# Patient Record
Sex: MALE | Race: WHITE | NOT HISPANIC OR LATINO | Employment: FULL TIME | URBAN - METROPOLITAN AREA
[De-identification: names, ages, dates, MRNs, and addresses within clinical notes are randomized per-mention and may not be internally consistent; named-entity substitution may affect disease eponyms.]

---

## 2017-01-10 ENCOUNTER — HOSPITAL ENCOUNTER (EMERGENCY)
Facility: CLINIC | Age: 37
Discharge: HOME | End: 2017-01-10
Attending: EMERGENCY MEDICINE | Admitting: EMERGENCY MEDICINE
Payer: COMMERCIAL

## 2017-01-10 VITALS
SYSTOLIC BLOOD PRESSURE: 150 MMHG | HEIGHT: 70 IN | TEMPERATURE: 98.3 F | DIASTOLIC BLOOD PRESSURE: 88 MMHG | OXYGEN SATURATION: 99 %

## 2017-01-10 DIAGNOSIS — L03.113 CELLULITIS OF RIGHT UPPER EXTREMITY: ICD-10-CM

## 2017-01-10 PROCEDURE — 99283 EMERGENCY DEPT VISIT LOW MDM: CPT

## 2017-01-10 RX ORDER — CLINDAMYCIN HCL 300 MG
300 CAPSULE ORAL 4 TIMES DAILY
Qty: 40 CAPSULE | Refills: 0 | Status: SHIPPED | OUTPATIENT
Start: 2017-01-10 | End: 2017-01-20

## 2017-01-10 ASSESSMENT — ENCOUNTER SYMPTOMS
FEVER: 0
SORE THROAT: 1
NAUSEA: 0
VOMITING: 0
SHORTNESS OF BREATH: 0
DIARRHEA: 0
COLOR CHANGE: 1

## 2017-01-10 NOTE — ED AVS SNAPSHOT
Emergency Department    [address] [place] [address]    Phone:  [phone]    Fax:  [phone]                                       Lj Lamas   MRN: [id number]    Department:   Emergency Department   Date of Visit:  1/10/2017           After Visit Summary Signature Page     I have received my discharge instructions, and my questions have been answered. I have discussed any challenges I see with this plan with the nurse or doctor.    ..........................................................................................................................................  Patient/Patient Representative Signature      ..........................................................................................................................................  Patient Representative Print Name and Relationship to Patient    ..................................................               ................................................  Date                                            Time    ..........................................................................................................................................  Reviewed by Signature/Title    ...................................................              ..............................................  Date                                                            Time

## 2017-01-10 NOTE — ED AVS SNAPSHOT
Emergency Department    [address] [facility] [address]    Phone:  [phone]    Fax:  [phone]                                       Lj Lamas   MRN: [id number]    Department:   Emergency Department   Date of Visit:  1/10/2017           Patient Information     Date Of Birth          1980        Your diagnoses for this visit were:     Cellulitis of right upper extremity        You were seen by Eligio Quiroga MD.      Follow-up Information     Schedule an appointment as soon as possible for a visit with [facility].    Specialties:  Podiatry, Internal Medicine, Family Medicine    Why:  If symptoms worsen    Contact information:    [address] [facility] [address]  [facility] [address] [phone]        Discharge Instructions         Discharge Instructions for Cellulitis  You have been diagnosed with cellulitis. This is an infection in the deepest layer of the skin. In some cases, the infection also affects the muscle. Cellulitis is caused by bacteria. The bacteria can enter the body through broken skin. This can happen with a cut, scratch, animal bite, or an insect bite that has been scratched. You may have been treated in the hospital with antibiotics and fluids. You will likely be given a prescription for antibiotics to take at home. This sheet will help you take care of yourself at home.  Home Care  When you are home:    Take the prescribed antibiotic medication you are given as directed until it is gone. Take it even if you feel better. It treats the infection and stops it from returning. Not taking all of the medication can make future infections hard to treat.    Keep the infected area clean.    When possible, raise the infected area above the level of your heart. This helps keep swelling down.    Talk to your doctor if you are in pain. Ask what kind of over-the-counter medication you can take for pain.    Apply clean bandages as advised.    Take  your temperature once a day for a week.    Wash your hands often to prevent spreading the infection.  In the future, wash your hands before and after you touch cuts, scratches, or bandages. This will help prevent infection.   When to Call Your Doctor  Call your doctor immediately if you have any of the following:    Vomiting    Fever of100.4 F (38 C) or higher, or as directed by your health care provider    Shaking chills    Redness that gets worse in or around the infected area    Swelling of the infected area    Pain that gets worse in or around the infected area    Difficulty or pain when moving the joints above or below the infected area    Discharge or pus draining from the area     7209-1504 [employer]. [address] [place] [address], [place] [address]. All rights reserved. This information is not intended as a substitute for professional medical care. Always follow your healthcare professional's instructions.          24 Hour Appointment Hotline       To make an appointment at any [facility], call [phone] ([phone]). If you don't have a family doctor or clinic, we will help you find one. [facility] clinics are conveniently located to serve the needs of you and your family.             Review of your medicines      START taking        Dose / Directions Last dose taken    clindamycin 300 MG capsule   Commonly known as:  CLEOCIN   Dose:  300 mg   Quantity:  40 capsule        Take 1 capsule (300 mg) by mouth 4 times daily for 10 days   Refills:  0                Prescriptions were sent or printed at these locations (1 Prescription)                   Other Prescriptions                Printed at Department/Unit printer (1 of 1)         clindamycin (CLEOCIN) 300 MG capsule                Orders Needing Specimen Collection     None      Pending Results     No orders found from 1/9/2017 to 1/11/2017.            Pending Culture Results     No orders found from 1/9/2017 to 1/11/2017.              Test Results from your hospital stay            Clinical Quality Measure: Blood Pressure Screening     Your blood pressure was checked while you were in the emergency department today. The last reading we obtained was  BP: 150/88 mmHg . Please read the guidelines below about what these numbers mean and what you should do about them.  If your systolic blood pressure (the top number) is less than 120 and your diastolic blood pressure (the bottom number) is less than 80, then your blood pressure is normal. There is nothing more that you need to do about it.  If your systolic blood pressure (the top number) is 120-139 or your diastolic blood pressure (the bottom number) is 80-89, your blood pressure may be higher than it should be. You should have your blood pressure rechecked within a year by a primary care provider.  If your systolic blood pressure (the top number) is 140 or greater or your diastolic blood pressure (the bottom number) is 90 or greater, you may have high blood pressure. High blood pressure is treatable, but if left untreated over time it can put you at risk for heart attack, stroke, or kidney failure. You should have your blood pressure rechecked by a primary care provider within the next 4 weeks.  If your provider in the emergency department today gave you specific instructions to follow-up with your doctor or provider even sooner than that, you should follow that instruction and not wait for up to 4 weeks for your follow-up visit.        Thank you for choosing [facility]       Thank you for choosing [facility] for your care. Our goal is always to provide you with excellent care. Hearing back from our patients is one way we can continue to improve our services. Please take a few minutes to complete the written survey that you may receive in the mail after you visit with us. Thank you!        [employer] Information     "[employer] lets you send messages to your doctor, view your test results, renew your  "prescriptions, schedule appointments and more. To sign up, go to [email or web address] . Click on \"Log in\" on the left side of the screen, which will take you to the Welcome page. Then click on \"Sign up Now\" on the right side of the page.     You will be asked to enter the access code listed below, as well as some personal information. Please follow the directions to create your username and password.     Your access code is: [email or web address]  Expires: 4/10/2017  6:44 PM     Your access code will  in 90 days. If you need help or a new code, please call your [facility] clinic or [phone].        Care EveryWhere ID     This is your Care EveryWhere ID. This could be used by other organizations to access your [facility] medical records  [id number]        After Visit Summary       This is your record. Keep this with you and show to your community pharmacist(s) and doctor(s) at your next visit.                  "

## 2017-01-11 NOTE — DISCHARGE INSTRUCTIONS
Discharge Instructions for Cellulitis  You have been diagnosed with cellulitis. This is an infection in the deepest layer of the skin. In some cases, the infection also affects the muscle. Cellulitis is caused by bacteria. The bacteria can enter the body through broken skin. This can happen with a cut, scratch, animal bite, or an insect bite that has been scratched. You may have been treated in the hospital with antibiotics and fluids. You will likely be given a prescription for antibiotics to take at home. This sheet will help you take care of yourself at home.  Home Care  When you are home:    Take the prescribed antibiotic medication you are given as directed until it is gone. Take it even if you feel better. It treats the infection and stops it from returning. Not taking all of the medication can make future infections hard to treat.    Keep the infected area clean.    When possible, raise the infected area above the level of your heart. This helps keep swelling down.    Talk to your doctor if you are in pain. Ask what kind of over-the-counter medication you can take for pain.    Apply clean bandages as advised.    Take your temperature once a day for a week.    Wash your hands often to prevent spreading the infection.  In the future, wash your hands before and after you touch cuts, scratches, or bandages. This will help prevent infection.   When to Call Your Doctor  Call your doctor immediately if you have any of the following:    Vomiting    Fever of100.4 F (38 C) or higher, or as directed by your health care provider    Shaking chills    Redness that gets worse in or around the infected area    Swelling of the infected area    Pain that gets worse in or around the infected area    Difficulty or pain when moving the joints above or below the infected area    Discharge or pus draining from the area     2087-1883 [employer]. [address] [place] [address], [place] [address]. All rights reserved. This  information is not intended as a substitute for professional medical care. Always follow your healthcare professional's instructions.

## 2017-01-11 NOTE — ED PROVIDER NOTES
"  History     Chief Complaint:    Rash      HPI   Lj Lamas is a 36 year old male with a history of MRSA infection 4 months ago on his leg,  who presents complaining of finding a red spot over \"a bump\" on his arm after showering. The tumor is on the anterior aspect of the superior right arm, he refers that it's increased in size since this morning.  No numbness or tingling, he denies fever, nausea and vomiting, diarrhea or rashes. He also denies chest pain or SOB.he reports previous  Congestion and sore throat with negative strep throat.    Allergies:  The patient has no known drug allergies.    Medications:    The patient is not currently taking any prescribed medications.    Past Medical History:    Lower extremity MRSA infection 2016    Past Surgical History:    No past surgical history on file.    Family History:    No family history on file.    Social History:  Marital Status:   [4]     Review of Systems   Constitutional: Negative for fever.   HENT: Positive for congestion and sore throat.    Respiratory: Negative for shortness of breath.    Cardiovascular: Negative for chest pain.   Gastrointestinal: Negative for nausea, vomiting and diarrhea.   Skin: Positive for color change.   All other systems reviewed and are negative.        Physical Exam   First Vitals:       Physical Exam  Nursing note and vitals reviewed.  Constitutional:   Awake alert  HENT:   Pharynx normal, TMs normal, atraumatic  Mouth/Throat:  Oropharynx is clear and moist.   Cardiovascular: Normal rate, regular rhythm, normal heart sounds and intact distal pulses.    Pulmonary/Chest:  Effort normal and breath sounds normal. No respiratory distress. No wheezes. No rales. No tenderness.   Neurological:  CMS normal.  Skin:    Skin is warm and dry. 2x3 cm area of erythema and warmth over the bernabe-superior aspect of right arm, without induration or abscess.      Emergency Department Course     Emergency Department Course:  Nursing notes " and vitals reviewed.  I performed an exam of the patient as documented above.     I discussed the treatment plan with the patient. They expressed understanding of this plan and consented to discharge. They will be discharged home with instructions for care and follow up. In addition, the patient will return to the emergency department if their symptoms persist, worsen, if new symptoms arise or if there is any concern.  All questions were answered.    I personally reviewed the findings with the patient and answered all related questions prior to discharge.    Impression & Plan      Medical Decision Makin-year-old male noted a red area on his right upper arm during his shower today/This morning.  He was reading online and is concerned for possible blood clot.  No chest pain or shortness of breath.  He does have a recent history of MRSA infection to his leg.  No fever.  Area of redness is getting a little bit larger since this morning.  He has some mild discomfort with it.  No numbness tingling or weakness.  No fever.  Exam otherwise unremarkable.  No indication for DVT.  I think this is more skin cellulitis.  No sign of trauma or injury.  Will cover with clindamycin for 10 days and referral to local primary care clinic for recheck if not improving.. PERC negative    Diagnosis:    ICD-10-CM    1. Cellulitis of right upper extremity L03.113        Disposition:   home    Discharge Medications:  New Prescriptions    CLINDAMYCIN (CLEOCIN) 300 MG CAPSULE    Take 1 capsule (300 mg) by mouth 4 times daily for 10 days     Scribe Disclosure:  Tianna SANCHEZ, am serving as a scribe at 6:44 PM on 1/10/2017 to document services personally performed by Eligio Quiroga MD, based on my observations and the provider's statements to me.        Eligio Quiroga MD  01/10/17 9716

## 2017-02-09 ENCOUNTER — OFFICE VISIT (OUTPATIENT)
Dept: FAMILY MEDICINE | Facility: CLINIC | Age: 37
End: 2017-02-09
Payer: COMMERCIAL

## 2017-02-09 VITALS
HEIGHT: 70 IN | TEMPERATURE: 97.6 F | RESPIRATION RATE: 20 BRPM | DIASTOLIC BLOOD PRESSURE: 86 MMHG | BODY MASS INDEX: 45.1 KG/M2 | HEART RATE: 71 BPM | SYSTOLIC BLOOD PRESSURE: 138 MMHG | OXYGEN SATURATION: 98 % | WEIGHT: 315 LBS

## 2017-02-09 DIAGNOSIS — M25.571 RIGHT ANKLE PAIN, UNSPECIFIED CHRONICITY: ICD-10-CM

## 2017-02-09 DIAGNOSIS — E66.01 MORBID OBESITY DUE TO EXCESS CALORIES (H): ICD-10-CM

## 2017-02-09 DIAGNOSIS — Z00.00 ROUTINE GENERAL MEDICAL EXAMINATION AT A HEALTH CARE FACILITY: Primary | ICD-10-CM

## 2017-02-09 DIAGNOSIS — I87.2 VENOUS (PERIPHERAL) INSUFFICIENCY: ICD-10-CM

## 2017-02-09 DIAGNOSIS — Z23 NEED FOR PROPHYLACTIC VACCINATION AND INOCULATION AGAINST INFLUENZA: ICD-10-CM

## 2017-02-09 PROCEDURE — 99385 PREV VISIT NEW AGE 18-39: CPT | Performed by: INTERNAL MEDICINE

## 2017-02-09 RX ORDER — CHLORAL HYDRATE 500 MG
2 CAPSULE ORAL DAILY
COMMUNITY
End: 2017-05-11

## 2017-02-09 RX ORDER — SODIUM PHOSPHATE,MONO-DIBASIC 19G-7G/118
1 ENEMA (ML) RECTAL DAILY
COMMUNITY
End: 2018-03-19

## 2017-02-09 RX ORDER — MULTIPLE VITAMINS W/ MINERALS TAB 9MG-400MCG
1 TAB ORAL DAILY
COMMUNITY
End: 2018-03-19

## 2017-02-09 NOTE — NURSING NOTE
"Chief Complaint   Patient presents with     Physical     Not Fasting       Initial /86 mmHg  Pulse 71  Temp(Src) 97.6  F (36.4  C) (Oral)  Resp 20  Ht 5' 10\" (1.778 m)  Wt 361 lb (163.749 kg)  BMI 51.80 kg/m2  SpO2 98% Estimated body mass index is 51.8 kg/(m^2) as calculated from the following:    Height as of this encounter: 5' 10\" (1.778 m).    Weight as of this encounter: 361 lb (163.749 kg).  Medication Reconciliation: complete   Viv Lamas CMA (AAMA)      "

## 2017-02-09 NOTE — PROGRESS NOTES
SUBJECTIVE:     CC: Lj Lamas is an 36 year old male who presents for preventative health visit.     Healthy Habits:    Do you get at least three servings of calcium containing foods daily (dairy, green leafy vegetables, etc.)? no, taking calcium and/or vitamin D supplement: yes -     Amount of exercise or daily activities, outside of work: 2-3 day(s) per week    Problems taking medications regularly No    Medication side effects: No    Have you had an eye exam in the past two years? yes    Do you see a dentist twice per year? No    Do you have sleep apnea, excessive snoring or daytime drowsiness?yes    1 year history of ankle pain after strain injury refractory to course of physical therapy left   He was treated for cellulitis in his right arm 1/10/17      Today's PHQ-2 Score:   PHQ-2 ( 1999 Pf[employer]) 2/9/2017   Q1: Little interest or pleasure in doing things 0   Q2: Feeling down, depressed or hopeless 1   PHQ-2 Score 1     Abuse: Current or Past(Physical, Sexual or Emotional)- No  Do you feel safe in your environment - Yes    Social History   Substance Use Topics     Smoking status: Former Smoker     Smokeless tobacco: Never Used     Alcohol Use: 0.0 oz/week     0 Standard drinks or equivalent per week     The patient does not drink >3 drinks per day nor >7 drinks per week.    Last PSA: No results found for: PSA    No results for input(s): CHOL, HDL, LDL, TRIG, CHOLHDLRATIO, NHDL in the last 46810 hours.    Reviewed orders with patient. Reviewed health maintenance and updated orders accordingly - Yes    All Histories reviewed and updated in Epic.  Past Medical History   Diagnosis Date     Venous (peripheral) insufficiency 2/9/2017      History reviewed. No pertinent past surgical history.    ROS:  C: NEGATIVE for fever, chills, he has gained weigh in recent months  I: NEGATIVE for worrisome rashes, moles or lesions; treated for cellulitis in leg and arm   E: NEGATIVE for vision changes or irritation  ENT:  "NEGATIVE for ear, mouth and throat problems  R: NEGATIVE for significant cough or SOB  CV: NEGATIVE for chest pain, palpitations; he gets intermittent edema in his bilateral legs   GI: NEGATIVE for nausea, abdominal pain, heartburn, or change in bowel habits   male: negative for dysuria, hematuria, decreased urinary stream, erectile dysfunction, urethral discharge  M: See HPI, also has plantar fasciitis,   N: He describes a long history of paresthesias in his bilateral feet form \"compressed vertebra\" in his back from a remote ski injury   P: He has stress related to break up currently, but these symptoms are mild and he denies suicidal ideation     Patient Active Problem List   Diagnosis     Morbid obesity due to excess calories (H)     Venous (peripheral) insufficiency     History reviewed. No pertinent past surgical history.    Social History   Substance Use Topics     Smoking status: Former Smoker     Smokeless tobacco: Never Used     Alcohol Use: 0.0 oz/week     0 Standard drinks or equivalent per week     Family History   Problem Relation Age of Onset     DIABETES Mother      HEART DISEASE Father      DIABETES Maternal Grandfather      Lung Cancer Maternal Grandfather      Bone Cancer Maternal Grandfather      CANCER Paternal Grandfather          Current Outpatient Prescriptions   Medication Sig Dispense Refill     fish oil-omega-3 fatty acids 1000 MG capsule Take 2 g by mouth daily       glucosamine-chondroitin 500-400 MG CAPS per capsule Take 1 capsule by mouth daily       Probiotic Product (PROBIOTIC + OMEGA-3 PO)        multivitamin, therapeutic with minerals (MULTI-VITAMIN) TABS tablet Take 1 tablet by mouth daily       No Known Allergies  OBJECTIVE:     /86 mmHg  Pulse 71  Temp(Src) 97.6  F (36.4  C) (Oral)  Resp 20  Ht 5' 10\" (1.778 m)  Wt 361 lb (163.749 kg)  BMI 51.80 kg/m2  SpO2 98%  EXAM:  GENERAL: healthy, alert and no distress  EYES: Eyes grossly normal to inspection, PERRL and " conjunctivae and sclerae normal  HENT: ear canals and TM's normal, nose and mouth without ulcers or lesions  NECK: no adenopathy, no asymmetry, masses, or scars and thyroid normal to palpation  RESP: lungs clear to auscultation - no rales, rhonchi or wheezes  CV: regular rate and rhythm, normal S1 S2, no S3 or S4, no murmur, click or rub, and peripheral pulses strong  ABDOMEN: Obese,soft, nontender, no hepatosplenomegaly, no masses and bowel sounds normal   (male): normal male genitalia without lesions or urethral discharge, no hernia  MS: left ankle with full passive ROM without pain and without bony tenderness; 2+ bilateral lower extremity edema; varicose vein on posterior of left leg that is mildly tender  SKIN: Skin changes in both legs consistent with venous stasis and stasis dermatitis   NEURO: Normal strength and tone, mentation intact and speech normal  PSYCH: mentation appears normal, affect normal/bright    ASSESSMENT/PLAN:     1. Routine general medical examination at a health care facility      2. Right ankle pain, unspecified chronicity  See podiatry regarding this  Weight loss would help and weight loss would make surgical outcome better if surgery is indcated   - PODIATRY/FOOT & ANKLE SURGERY REFERRAL    3. Venous (peripheral) insufficiency  Discussed weight loss, diet considerations, compression hosiery and leg elevation.  Can see vein solutions for varicose vein on left leg     4. Morbid obesity due to excess calories (H)  Counseled on diet and exercise interventions to promote weight loss See instructions     5. Need for prophylactic vaccination and inoculation against influenza  He has this already this year       COUNSELING:  Reviewed preventive health counseling, as reflected in patient instructions  Special attention given to:        Regular exercise       Healthy diet/nutrition    BP Screening:   Last 3 BP Readings:    BP Readings from Last 3 Encounters:   02/09/17 138/86   01/10/17 150/88  "      The following was recommended to the patient:  Re-screen BP within a year and recommended lifestyle modifications       reports that he has quit smoking. He has never used smokeless tobacco.    Estimated body mass index is 51.8 kg/(m^2) as calculated from the following:    Height as of this encounter: 5' 10\" (1.778 m).    Weight as of this encounter: 361 lb (163.749 kg).   Weight management plan: Discussed healthy diet and exercise guidelines and patient will follow up in 12 months in clinic to re-evaluate.    Counseling Resources:  ATP IV Guidelines  Pooled Cohorts Equation Calculator  FRAX Risk Assessment  ICSI Preventive Guidelines  Dietary Guidelines for Americans, 2010  USDA's MyPlate  ASA Prophylaxis  Lung CA Screening    José Kan MD  [facility]  "

## 2017-02-09 NOTE — MR AVS SNAPSHOT
After Visit Summary   2/9/2017    Lj Lamas    MRN: [id number]           Patient Information     Date Of Birth          1980        Visit Information        Provider Department      2/9/2017 3:00 PM José Kna MD [facility]        Today's Diagnoses     Need for prophylactic vaccination and inoculation against influenza    -  1     Routine general medical examination at a health care facility         Right ankle pain, unspecified chronicity         Venous (peripheral) insufficiency           Care Instructions      Preventive Health Recommendations  Male Ages 26 - 39    Yearly exam:             See your health care provider every year in order to  o   Review health changes.   o   Discuss preventive care.    o   Review your medicines if your doctor has prescribed any.    You should be tested each year for STDs (sexually transmitted diseases), if you re at risk.     After age 35, talk to your provider about cholesterol testing. If you are at risk for heart disease, have your cholesterol tested at least every 5 years.     If you are at risk for diabetes, you should have a diabetes test (fasting glucose).  Shots: Get a flu shot each year. Get a tetanus shot every 10 years.     Nutrition:    Eat at least 5 servings of fruits and vegetables daily.     Eat whole-grain bread, whole-wheat pasta and brown rice instead of white grains and rice.     Talk to your provider about Calcium and Vitamin D.     Lifestyle    Exercise for at least 150 minutes a week (30 minutes a day, 5 days a week). This will help you control your weight and prevent disease.     Limit alcohol to one drink per day.     No smoking.     Wear sunscreen to prevent skin cancer.     See your dentist every six months for an exam and cleaning.     ---------------------------------------------------------------------------------    I recommend signing up for Svetlana Butler, Weight [employer] or [employer] to help with weight  loss      Call  [facility] to discuss management of your left leg varicose vein    Use compression stockings if you can to help with swelling    Return in 3 months Fasting         Follow-ups after your visit        Additional Services     PODIATRY/FOOT & ANKLE SURGERY REFERRAL       Your provider has referred you to: DIAN: [facility] and [facility] Raquel [phone]   [email or web address]    Please be aware that coverage of these services is subject to the terms and limitations of your health insurance plan.  Call member services at your health plan with any benefit or coverage questions.      Please bring the following to your appointment:  >>   Any x-rays, CTs or MRIs which have been performed.  Contact the facility where they were done to arrange for  prior to your scheduled appointment.    >>   List of current medications   >>   This referral request   >>   Any documents/labs given to you for this referral                  Follow-up notes from your care team     Return in about 3 months (around 5/9/2017) for Routine Visit fasting .      Who to contact     If you have questions or need follow up information about today's clinic visit or your schedule please contact [facility] directly at [phone].  Normal or non-critical lab and imaging results will be communicated to you by MyChart, letter or phone within 4 business days after the clinic has received the results. If you do not hear from us within 7 days, please contact the clinic through MyChart or phone. If you have a critical or abnormal lab result, we will notify you by phone as soon as possible.  Submit refill requests through Mobstats or call your pharmacy and they will forward the refill request to us. Please allow 3 business days for your refill to be completed.          Additional Information About Your Visit        Mobstats Information     [employer] lets you send messages to your doctor, view your  "test results, renew your prescriptions, schedule appointments and more. To sign up, go to [email or web address] . Click on \"Log in\" on the left side of the screen, which will take you to the Welcome page. Then click on \"Sign up Now\" on the right side of the page.     You will be asked to enter the access code listed below, as well as some personal information. Please follow the directions to create your username and password.     Your access code is: [email or web address]  Expires: 4/10/2017  6:44 PM     Your access code will  in 90 days. If you need help or a new code, please call your [place] clinic or [phone].        Care EveryWhere ID     This is your Care EveryWhere ID. This could be used by other organizations to access your [place] medical records  [id number]        Your Vitals Were     Pulse Temperature Respirations Height BMI (Body Mass Index) Pulse Oximetry    71 97.6  F (36.4  C) (Oral) 20 5' 10\" (1.778 m) 51.80 kg/m2 98%       Blood Pressure from Last 3 Encounters:   17 138/86   01/10/17 150/88    Weight from Last 3 Encounters:   17 361 lb (163.749 kg)              We Performed the Following     PODIATRY/FOOT & ANKLE SURGERY REFERRAL        Primary Care Provider Office Phone # Fax #    José Kan [phone]       [facility] [address]  [facility] [address]        Thank you!     Thank you for choosing [facility]  for your care. Our goal is always to provide you with excellent care. Hearing back from our patients is one way we can continue to improve our services. Please take a few minutes to complete the written survey that you may receive in the mail after your visit with us. Thank you!             Your Updated Medication List - Protect others around you: Learn how to safely use, store and throw away your medicines at www.disposemymeds.org.          This list is accurate as of: 17  3:39 PM.  Always use your most recent med list.    "                Brand Name Dispense Instructions for use    fish oil-omega-3 fatty acids 1000 MG capsule      Take 2 g by mouth daily       glucosamine-chondroitin 500-400 MG Caps per capsule      Take 1 capsule by mouth daily       Multi-vitamin Tabs tablet      Take 1 tablet by mouth daily       PROBIOTIC + OMEGA-3 PO

## 2017-02-09 NOTE — PATIENT INSTRUCTIONS
Preventive Health Recommendations  Male Ages 26 - 39    Yearly exam:             See your health care provider every year in order to  o   Review health changes.   o   Discuss preventive care.    o   Review your medicines if your doctor has prescribed any.    You should be tested each year for STDs (sexually transmitted diseases), if you re at risk.     After age 35, talk to your provider about cholesterol testing. If you are at risk for heart disease, have your cholesterol tested at least every 5 years.     If you are at risk for diabetes, you should have a diabetes test (fasting glucose).  Shots: Get a flu shot each year. Get a tetanus shot every 10 years.     Nutrition:    Eat at least 5 servings of fruits and vegetables daily.     Eat whole-grain bread, whole-wheat pasta and brown rice instead of white grains and rice.     Talk to your provider about Calcium and Vitamin D.     Lifestyle    Exercise for at least 150 minutes a week (30 minutes a day, 5 days a week). This will help you control your weight and prevent disease.     Limit alcohol to one drink per day.     No smoking.     Wear sunscreen to prevent skin cancer.     See your dentist every six months for an exam and cleaning.     ---------------------------------------------------------------------------------    I recommend signing up for Svetlana Butler, Weight Watchers or [employer] to help with weight loss      Call  [facility] to discuss management of your left leg varicose vein    Use compression stockings if you can to help with swelling    Return in 3 months Fasting

## 2017-05-05 ENCOUNTER — APPOINTMENT (OUTPATIENT)
Dept: VASCULAR SURGERY | Facility: CLINIC | Age: 37
End: 2017-05-05
Payer: COMMERCIAL

## 2017-05-05 PROCEDURE — 99207 ZZC VEINSOLUTIONS FREE SCREENING: CPT | Performed by: SURGERY

## 2017-05-09 ENCOUNTER — RADIANT APPOINTMENT (OUTPATIENT)
Dept: GENERAL RADIOLOGY | Facility: CLINIC | Age: 37
End: 2017-05-09
Attending: PODIATRIST
Payer: COMMERCIAL

## 2017-05-09 ENCOUNTER — OFFICE VISIT (OUTPATIENT)
Dept: PODIATRY | Facility: CLINIC | Age: 37
End: 2017-05-09
Payer: COMMERCIAL

## 2017-05-09 VITALS
SYSTOLIC BLOOD PRESSURE: 126 MMHG | BODY MASS INDEX: 45.1 KG/M2 | HEIGHT: 70 IN | DIASTOLIC BLOOD PRESSURE: 80 MMHG | HEART RATE: 73 BPM | WEIGHT: 315 LBS

## 2017-05-09 DIAGNOSIS — G89.29 CHRONIC PAIN OF LEFT ANKLE: ICD-10-CM

## 2017-05-09 DIAGNOSIS — M72.2 PLANTAR FASCIITIS: ICD-10-CM

## 2017-05-09 DIAGNOSIS — M25.572 CHRONIC PAIN OF LEFT ANKLE: ICD-10-CM

## 2017-05-09 DIAGNOSIS — G89.29 CHRONIC PAIN OF LEFT ANKLE: Primary | ICD-10-CM

## 2017-05-09 DIAGNOSIS — M25.572 CHRONIC PAIN OF LEFT ANKLE: Primary | ICD-10-CM

## 2017-05-09 PROCEDURE — 73610 X-RAY EXAM OF ANKLE: CPT | Mod: LT

## 2017-05-09 PROCEDURE — 99203 OFFICE O/P NEW LOW 30 MIN: CPT | Performed by: PODIATRIST

## 2017-05-09 NOTE — MR AVS SNAPSHOT
After Visit Summary   5/9/2017    Lj Lamas    MRN: [id number]           Patient Information     Date Of Birth          1980        Visit Information        Provider Department      5/9/2017 4:40 PM Reno Griffith DPM [facility]        Today's Diagnoses     Chronic pain of left ankle    -  1    Plantar fasciitis          Care Instructions    PLANTAR FASCIITIS     What is plantar fasciitis?     Plantar fasciitis is often referred to as heel spurs or heel pain. Plantar fasciitis is a very common problem that affects people of all foot shapes, age, weight and activity level. Pain may be in the arch or on the weight-bearing surface of the heel. The pain may come on without injury or identifiable cause. Pain is generally present when first getting out of bed in the morning or up from a seated break.   What causes plantar fasciitis?     The plantar fascia is a dense fibrous band of tissue that stretches across the bottom surface of the foot. The fascia helps support the foot muscles and arch. Plantar fasciitis is thought to be caused by mechanical strain or overload. Frequent walking without shoes or wearing unsupportive shoes is thought to cause structural overload and ultimately inflammation of the plantar fascia. Some people have heel spurs that can be seen on x-ray. The heel spur is actually evidence of plantar fascitis and is not the cause.   How long will this last?     Plantar fasciitis can last from one day to a lifetime. Some people get intermittent fasciitis that is very short-lived. Others suffer daily for years. Excessive body weight, frequent bare foot walking, long hours on the feet, inadequate shoes, predisposing foot structures and excessive activity such as running are all potential issues that lead to chronic and/or recurring plantar fasciitis. Having plantar fasciitis means that you are forever prone to this problem and will require modification of some of the  above factors. Most people seek treatment within one to four months. Healing usually requires a similar one to four month time frame. Healing time is relative to the amount of effort spent treating the problem.   What can I do?     The easiest solution is to stop walking around your home without shoes. Plantar fasciitis is largely a shoe problem. Shoes are either not being worn often enough or your current shoes are inadequate for your weight, foot structure or activity level. The majority of shoes on the market today are not sufficient to resist development of plantar fasciitis or to promote healing. Assume that your current shoes are inadequate and will need to be replaced. Even high quality shoes wear out with 6 months to one year of frequent use. Weight loss is another option. Losing ten pounds in the next two months may be enough to resolve the problem. Ice applied to the area of pain two to three times per day for ten minutes each session can be very helpful. This should continue until the problem resolves. Achilles tendon stretching is essential. Stretch multiple times daily to promote healing and to prevent recurrence in the future.     What if this does not help?     Medical treatments often include custom arch supports, cortisone injections, physical therapy, splints to be worn in bed, prescription medications and surgery. The home treatments listed above will be necessary regardless of these advanced medical treatments. Surgery is rarely needed but is very helpful in selected cases.     Heel pain in my future?   Plantar fasciitis is highly recurrent. Risk factors often continue, including return to barefoot walking, inadequate shoes, excessive body weight, excessive activities, etc. Your lifestyle and foot structure may predispose you to recurrent plantar fasciitis. A daily prevention regimen can be very helpful. Ongoing use of shoe inserts, careful attention to appropriate shoes, daily Achilles  stretching, etc. may prevent recurrence. Prompt attention at the earliest warning signs of heel pain can resolve the problem in as short as a few days.   Below are some exercises for Plantar fasciitis:  Stair exercise  Step on a stair with the ball of your foot and hold your position for at least 15 seconds, then slowly step down with the heels of your foot. You can do this daily and as often as you want.   Picking the towel  Sit comfortably and then pick at a towel with your toes. Do this at least 10 to 20 times regularly. You can use any object other than a towel as long as the material is soft.  Rolling the bottle or ball  You can get a small ball or bottle and then roll it with your foot. Do this daily for at least 15 to 20 times.   Stretching the calf  Lie on your back, raise one foot, and then point your toes towards the floor. Do this daily for at least 15 to 20 times.   Flex the toes  Sit comfortably and then flex your toes by pointing it towards the floor or towards your body. This will relax and flex your foot and exercise your plantar fascia, the calf, and the Achilles tendon. The inability of the foot to stretch often causes the bunching up of the plantar fascia area, leading to the pain.  Towel stretch  Sling a towel around the ball of the foot and stretch the foot back.  Hold for 15-20 seconds.  Do this 4-5 times/day.    Massaging the calf and the plantar fascia also helps a lot in alleviating the pain and preventing its recurrence.          Over the Counter Inserts    Super Feet are the most common and easiest to find.    Locations include any [employer], [employer] in [place] on [address] [address] and in [facility] [address], Generate in \[facility] [place] [facility]\"" on [facility], [facility] Running Room in \[place]\"" on [facility], [facility] Running Room in [facility] [address], [employer] in [place] on [address] and [employer] in \[place]\"" on  [place] and in [place] on [facility].    Spenco can be found online and at [employer] in [facility] on [address], [employer] in [facility] on [address], [employer] Store in [place] on [address], [employer] in [place] on [address] and [employer] in [place] on [address].    Power Step can be a little harder to find.  Locations include [employer] in [place] on [address], [place] in [facility], [employer] Store in [place] on [address] and online    Walk-Fit - [employer]     [facility]    **  A good high quality over the counter insert can cost around $40-$50.      PRICE Therapy    Many aches and pains throughout the foot and ankle can be helped with many simple treatments.  This is usually described as PRICE Therapy.      P - Protection - often times, inflammation/pain in the lower extremity is not able to improve simply because the areas involved are never allowed to rest.  Every step we take can bother the problematic area.  Protecting those areas is an important step in the healing process.  This may involve a walking cast boot, a special insert/orthotic device, an ankle brace, or simply avoiding barefoot walking.    R - Rest - in addition to protecting the foot/ankle, resting is an important, but often times difficult, treatment option.  Getting off your feet when they bother you, and specifically avoiding activities that cause pain/discomfort, are very beneficial to prevent, and treat, foot/ankle pain.      I - Ice - icing regularly can help to decrease inflammation and swelling in the foot, thus decreasing pain.  Using an ice pack or a bag of frozen peas works very well.  Ice for 20 minutes multiple times per day as needed.  Do not place the ice directly on the skin as this can cause tissue damage.    C - Compression - using a compression wrap or an ACE wrap can help to decrease swelling, which can help to decrease pain.  Wearing the wraps is generally not needed  at night, but they should be worn on a regular basis when you are going to be on your feet for prolonged periods as gravity tends to pull fluids down to your feet/ankles.    E - Elevation - elevating your lower extremities multiple times daily for 15-20 minutes can help to decrease swelling, which works well in decreasing pain levels.      NSAID/Tylenol - An anti-inflammatory, like Aleve or ibuprofen, and/or a pain medication, such as Tylenol, can help to improve pain levels and get the issue resolved sooner rather than later.  Anyone with liver issues should be careful with Tylenol, and anyone with high blood pressure or heart, stomach or kidney issues should be careful with anti-inflammatories.  Please ask if you have questions about these medications, including dosage.        Body Mass Index (BMI)  Many things can cause foot and ankle problems. Foot structure, activity level, foot mechanics and injuries are common causes of pain.  One very important issue that often goes unmentioned is body weight. Extra weight can cause increased stress on muscles, ligaments, bones and tendons. Sometimes just a few extra pounds is all it takes to put one over her/his threshold. Without reducing that stress, it can be difficult to alleviate pain.   Some people are uncomfortable addressing this issue, but we feel it is important for you to think about it. As Foot & Ankle specialists, our job is addressing the lower extremity problem and possible causes.   Regarding extra body weight, we encourage patients to discuss diet and weight management plans with their primary care doctors. It is this team approach that gives you the best opportunity for pain relief and getting you back on your feet.           Follow-ups after your visit        Your next 10 appointments already scheduled     May 11, 2017  8:00 AM CDT   Office Visit with José Kan MD   [facility]    [address] [facility]  [id number] [phone]           Bring a current list of meds and any records pertaining to this visit.  For Physicals, please bring immunization records and any forms needing to be filled out.  Please arrive 10 minutes early to complete paperwork.            May 19, 2017  1:00 PM CDT   Ultrasound with  Vein Vascular Lab   [facility]    [address]  [facility] [id number] [phone]            May 19, 2017  2:00 PM CDT   Ultrasound Results with Varinder Dominguez MD   [facility])    [address]  [facility] [id number] [phone]              Future tests that were ordered for you today     Open Future Orders        Priority Expected Expires Ordered    MR Ankle Left w/o & w Contrast Routine  5/9/2018 5/9/2017            Who to contact     If you have questions or need follow up information about today's clinic visit or your schedule please contact [facility] directly at [phone].  Normal or non-critical lab and imaging results will be communicated to you by Dayakhart, letter or phone within 4 business days after the clinic has received the results. If you do not hear from us within 7 days, please contact the clinic through Dayakhart or phone. If you have a critical or abnormal lab result, we will notify you by phone as soon as possible.  Submit refill requests through Us[employer] or call your pharmacy and they will forward the refill request to us. Please allow 3 business days for your refill to be completed.          Additional Information About Your Visit        Dayakhart Information     [employer] gives you secure access to your electronic health record. If you see a primary care provider, you can also send messages to your care team and make appointments. If you have questions, please call your primary care clinic.  If you do not have a primary  "care provider, please call [phone] and they will assist you.        Care EveryWhere ID     This is your Care EveryWhere ID. This could be used by other organizations to access your [place] medical records  [id number]        Your Vitals Were     Pulse Height BMI (Body Mass Index)             73 5' 10\" (1.778 m) 49.88 kg/m2          Blood Pressure from Last 3 Encounters:   05/09/17 126/80   02/09/17 138/86   01/10/17 150/88    Weight from Last 3 Encounters:   05/09/17 (!) 347 lb 9.6 oz (157.7 kg)   02/09/17 (!) 361 lb (163.7 kg)               Primary Care Provider Office Phone # Fax #    José Kan [phone]       [facility] [address]  [facility] [address]        Thank you!     Thank you for choosing [facility]  for your care. Our goal is always to provide you with excellent care. Hearing back from our patients is one way we can continue to improve our services. Please take a few minutes to complete the written survey that you may receive in the mail after your visit with us. Thank you!             Your Updated Medication List - Protect others around you: Learn how to safely use, store and throw away your medicines at www.disposemymeds.org.          This list is accurate as of: 5/9/17  5:26 PM.  Always use your most recent med list.                   Brand Name Dispense Instructions for use    fish oil-omega-3 fatty acids 1000 MG capsule      Take 2 g by mouth daily       glucosamine-chondroitin 500-400 MG Caps per capsule      Take 1 capsule by mouth daily       Multi-vitamin Tabs tablet      Take 1 tablet by mouth daily       PROBIOTIC + OMEGA-3 PO            "

## 2017-05-09 NOTE — PROGRESS NOTES
PATIENT HISTORY:  Lj Lamas is a 36 year old male who presents to clinic for chronic left ankle pain.  Pt reports 10 sprains over his life, most recently winter 2015-16.  He went to PT after that injury.  Reports continuing pain in the ankle.  Denies feeling unstable.  Also with left plantar heel pain, worse after rest and with activity.  He has tried ice, heat, new shoes, otc inserts.  Not helping.      Review of Systems:  Patient denies fever, chills, rash, wound, stiffness, numbness, heart burn, blood in stool, chest pain with activity, calf pain when walking, shortness of breath with activity, chronic cough, easy bleeding/bruising, excessive thirst, fatigue, depression, anxiety.  Patient admits to limping, weakness, swelling of ankles.     PAST MEDICAL HISTORY:   Past Medical History:   Diagnosis Date     Venous (peripheral) insufficiency 2/9/2017        PAST SURGICAL HISTORY:   Past Surgical History:   Procedure Laterality Date     NO HISTORY OF SURGERY          MEDICATIONS:   Current Outpatient Prescriptions:      fish oil-omega-3 fatty acids 1000 MG capsule, Take 2 g by mouth daily, Disp: , Rfl:      glucosamine-chondroitin 500-400 MG CAPS per capsule, Take 1 capsule by mouth daily, Disp: , Rfl:      Probiotic Product (PROBIOTIC + OMEGA-3 PO), , Disp: , Rfl:      multivitamin, therapeutic with minerals (MULTI-VITAMIN) TABS tablet, Take 1 tablet by mouth daily, Disp: , Rfl:      ALLERGIES:  No Known Allergies     SOCIAL HISTORY:   Social History     Social History     Marital status:      Spouse name: N/A     Number of children: N/A     Years of education: N/A     Occupational History     Not on file.     Social History Main Topics     Smoking status: Former Smoker     Smokeless tobacco: Never Used     Alcohol use 0.0 oz/week     0 Standard drinks or equivalent per week     Drug use: No     Sexual activity: Not Currently     Other Topics Concern     Not on file     Social History Narrative       "  FAMILY HISTORY:   Family History   Problem Relation Age of Onset     DIABETES Mother      HEART DISEASE Father      DIABETES Maternal Grandfather      Lung Cancer Maternal Grandfather      Bone Cancer Maternal Grandfather      CANCER Paternal Grandfather         EXAM:Vitals: /80 (BP Location: Right arm, Patient Position: Chair, Cuff Size: Adult Large)  Pulse 73  Ht 5' 10\" (1.778 m)  Wt (!) 347 lb 9.6 oz (157.7 kg)  BMI 49.88 kg/m2  BMI= Body mass index is 49.88 kg/(m^2).    General appearance: Patient is alert and fully cooperative with history & exam.  No sign of distress is noted during the visit.     Psychiatric: Affect is pleasant & appropriate.  Patient appears motivated to improve health.     Respiratory: Breathing is regular & unlabored while sitting.     HEENT: Hearing is intact to spoken word.  Speech is clear.  No gross evidence of visual impairment that would impact ambulation.    Pt deferred RLE exam.     Dermatologic: Skin is intact to LLE.  Skin discoloration of lower leg consistent with venous stasis.  No paronychia or evidence of soft tissue infection is noted.     Vascular: DP & PT pulses are intact & regular on the left.  L ankle edema noted.  CFT and skin temperature are normal to both lower extremities.     Neurologic: Lower extremity sensation is intact to light touch.  No evidence of weakness or contracture in the lower extremities.  No evidence of neuropathy.     Musculoskeletal: No calf pain on L.  L ankle exam with tenderness to ATFL and CFL area, joint line.  No gross instability.  No pain with palpation of malleoli, syndesmosis, proximal fibula.  pain to plantar palpation of the left heel at the fascial insertion.  No pain with side to side heel squeeze.  Patient is ambulatory without assistive device or brace.  No gross ankle deformity noted.  No foot or ankle joint effusion is noted.    XRs of left ankle reviewed with pt.     ASSESSMENT:   Chronic L ankle pain  L plantar " fasciitis     PLAN:  Reviewed patient's chart in epic.  Discussed condition and treatment options including pros and cons.    Will order L ankle MRI.  F/u in clinic after scan.    The potential causes and nature of plantar fasciitis were discussed with the patient.  We reviewed the natural history/prognosis of the condition and risks if left untreated.      We discussed possible causes of the condition as it relates to the patients specific situation.      Conservative treatment options were reviewed:  appropriate shoes, avoidance of barefoot walking, inserts/orthoses, stretching, ice, massage, immobilization and NSAIDs.     We also reviewed the option of injection therapy.     After thorough discussion and answering all questions, the patient elected to try icing, stretching, superfeet inserts.          Reno Griffith DPM, FACFAS

## 2017-05-09 NOTE — NURSING NOTE
"Chief Complaint   Patient presents with     Ankle Pain     L ankle instability, swelling and ligament tenderness- No recent injury       Initial /80 (BP Location: Right arm, Patient Position: Chair, Cuff Size: Adult Large)  Pulse 73  Ht 5' 10\" (1.778 m)  Wt (!) 347 lb 9.6 oz (157.7 kg)  BMI 49.88 kg/m2 Estimated body mass index is 49.88 kg/(m^2) as calculated from the following:    Height as of this encounter: 5' 10\" (1.778 m).    Weight as of this encounter: 347 lb 9.6 oz (157.7 kg).  Medication Reconciliation: saundra Cordova MA May 9, 2017 4:42 PM      "

## 2017-05-09 NOTE — PATIENT INSTRUCTIONS
PLANTAR FASCIITIS     What is plantar fasciitis?     Plantar fasciitis is often referred to as heel spurs or heel pain. Plantar fasciitis is a very common problem that affects people of all foot shapes, age, weight and activity level. Pain may be in the arch or on the weight-bearing surface of the heel. The pain may come on without injury or identifiable cause. Pain is generally present when first getting out of bed in the morning or up from a seated break.   What causes plantar fasciitis?     The plantar fascia is a dense fibrous band of tissue that stretches across the bottom surface of the foot. The fascia helps support the foot muscles and arch. Plantar fasciitis is thought to be caused by mechanical strain or overload. Frequent walking without shoes or wearing unsupportive shoes is thought to cause structural overload and ultimately inflammation of the plantar fascia. Some people have heel spurs that can be seen on x-ray. The heel spur is actually evidence of plantar fascitis and is not the cause.   How long will this last?     Plantar fasciitis can last from one day to a lifetime. Some people get intermittent fasciitis that is very short-lived. Others suffer daily for years. Excessive body weight, frequent bare foot walking, long hours on the feet, inadequate shoes, predisposing foot structures and excessive activity such as running are all potential issues that lead to chronic and/or recurring plantar fasciitis. Having plantar fasciitis means that you are forever prone to this problem and will require modification of some of the above factors. Most people seek treatment within one to four months. Healing usually requires a similar one to four month time frame. Healing time is relative to the amount of effort spent treating the problem.   What can I do?     The easiest solution is to stop walking around your home without shoes. Plantar fasciitis is largely a shoe problem. Shoes are either not being worn often  enough or your current shoes are inadequate for your weight, foot structure or activity level. The majority of shoes on the market today are not sufficient to resist development of plantar fasciitis or to promote healing. Assume that your current shoes are inadequate and will need to be replaced. Even high quality shoes wear out with 6 months to one year of frequent use. Weight loss is another option. Losing ten pounds in the next two months may be enough to resolve the problem. Ice applied to the area of pain two to three times per day for ten minutes each session can be very helpful. This should continue until the problem resolves. Achilles tendon stretching is essential. Stretch multiple times daily to promote healing and to prevent recurrence in the future.     What if this does not help?     Medical treatments often include custom arch supports, cortisone injections, physical therapy, splints to be worn in bed, prescription medications and surgery. The home treatments listed above will be necessary regardless of these advanced medical treatments. Surgery is rarely needed but is very helpful in selected cases.     Heel pain in my future?   Plantar fasciitis is highly recurrent. Risk factors often continue, including return to barefoot walking, inadequate shoes, excessive body weight, excessive activities, etc. Your lifestyle and foot structure may predispose you to recurrent plantar fasciitis. A daily prevention regimen can be very helpful. Ongoing use of shoe inserts, careful attention to appropriate shoes, daily Achilles stretching, etc. may prevent recurrence. Prompt attention at the earliest warning signs of heel pain can resolve the problem in as short as a few days.   Below are some exercises for Plantar fasciitis:  Stair exercise  Step on a stair with the ball of your foot and hold your position for at least 15 seconds, then slowly step down with the heels of your foot. You can do this daily and as often  as you want.   Picking the towel  Sit comfortably and then pick at a towel with your toes. Do this at least 10 to 20 times regularly. You can use any object other than a towel as long as the material is soft.  Rolling the bottle or ball  You can get a small ball or bottle and then roll it with your foot. Do this daily for at least 15 to 20 times.   Stretching the calf  Lie on your back, raise one foot, and then point your toes towards the floor. Do this daily for at least 15 to 20 times.   Flex the toes  Sit comfortably and then flex your toes by pointing it towards the floor or towards your body. This will relax and flex your foot and exercise your plantar fascia, the calf, and the Achilles tendon. The inability of the foot to stretch often causes the bunching up of the plantar fascia area, leading to the pain.  Towel stretch  Sling a towel around the ball of the foot and stretch the foot back.  Hold for 15-20 seconds.  Do this 4-5 times/day.    Massaging the calf and the plantar fascia also helps a lot in alleviating the pain and preventing its recurrence.          Over the Counter Inserts    Super Feet are the most common and easiest to find.    Locations include any [employer], [employer] in [place] on [address] [address] and in [place] on [facility] [address], [employer] in [place] on [address], [facility] in [place] on [address], [facility] in [place] on [facility], [employer] in [place] on [address] [address] and [employer] in [place] on [address] and in [place] on [address].    Spenco can be found online and at [employer] [facility] in [place] on [address], [employer] in [facility], [employer] in [place] on [address], [employer] in [place] on [address] and [employer] in [place] on [address].    Power Step can be a little harder to find.  Locations include [employer] in [place] on [address],  [employer] in [facility], [employer] in [place] on [address] and online    [employer] - [employer]     [facility] - [facility]    **  A good high quality over the counter insert can cost around $40-$50.      PRICE Therapy    Many aches and pains throughout the foot and ankle can be helped with many simple treatments.  This is usually described as PRICE Therapy.      P - Protection - often times, inflammation/pain in the lower extremity is not able to improve simply because the areas involved are never allowed to rest.  Every step we take can bother the problematic area.  Protecting those areas is an important step in the healing process.  This may involve a walking cast boot, a special insert/orthotic device, an ankle brace, or simply avoiding barefoot walking.    R - Rest - in addition to protecting the foot/ankle, resting is an important, but often times difficult, treatment option.  Getting off your feet when they bother you, and specifically avoiding activities that cause pain/discomfort, are very beneficial to prevent, and treat, foot/ankle pain.      I - Ice - icing regularly can help to decrease inflammation and swelling in the foot, thus decreasing pain.  Using an ice pack or a bag of frozen peas works very well.  Ice for 20 minutes multiple times per day as needed.  Do not place the ice directly on the skin as this can cause tissue damage.    C - Compression - using a compression wrap or an ACE wrap can help to decrease swelling, which can help to decrease pain.  Wearing the wraps is generally not needed at night, but they should be worn on a regular basis when you are going to be on your feet for prolonged periods as gravity tends to pull fluids down to your feet/ankles.    E - Elevation - elevating your lower extremities multiple times daily for 15-20 minutes can help to decrease swelling, which works well in decreasing pain levels.      NSAID/Tylenol - An anti-inflammatory, like Aleve or  ibuprofen, and/or a pain medication, such as Tylenol, can help to improve pain levels and get the issue resolved sooner rather than later.  Anyone with liver issues should be careful with Tylenol, and anyone with high blood pressure or heart, stomach or kidney issues should be careful with anti-inflammatories.  Please ask if you have questions about these medications, including dosage.        Body Mass Index (BMI)  Many things can cause foot and ankle problems. Foot structure, activity level, foot mechanics and injuries are common causes of pain.  One very important issue that often goes unmentioned is body weight. Extra weight can cause increased stress on muscles, ligaments, bones and tendons. Sometimes just a few extra pounds is all it takes to put one over her/his threshold. Without reducing that stress, it can be difficult to alleviate pain.   Some people are uncomfortable addressing this issue, but we feel it is important for you to think about it. As Foot & Ankle specialists, our job is addressing the lower extremity problem and possible causes.   Regarding extra body weight, we encourage patients to discuss diet and weight management plans with their primary care doctors. It is this team approach that gives you the best opportunity for pain relief and getting you back on your feet.

## 2017-05-09 NOTE — PROGRESS NOTES
Weight management plan: Patient was referred to their PCP to discuss a diet and exercise plan.     DIMAS Cordova MA May 9, 2017 4:42 PM

## 2017-05-09 NOTE — LETTER
5/9/2017       RE: Lj Lamas  [address]  [address]  [place] [address]           Dear Colleague,    Thank you for referring your patient, Lj Lamas, to the [facility]. Please see a copy of my visit note below.    PATIENT HISTORY:  Lj Lamas is a 36 year old male who presents to clinic for chronic left ankle pain.  Pt reports 10 sprains over his life, most recently winter 2015-16.  He went to PT after that injury.  Reports continuing pain in the ankle.  Denies feeling unstable.  Also with left plantar heel pain, worse after rest and with activity.  He has tried ice, heat, new shoes, otc inserts.  Not helping.      Review of Systems:  Patient denies fever, chills, rash, wound, stiffness, numbness, heart burn, blood in stool, chest pain with activity, calf pain when walking, shortness of breath with activity, chronic cough, easy bleeding/bruising, excessive thirst, fatigue, depression, anxiety.  Patient admits to limping, weakness, swelling of ankles.     PAST MEDICAL HISTORY:   Past Medical History:   Diagnosis Date     Venous (peripheral) insufficiency 2/9/2017        PAST SURGICAL HISTORY:   Past Surgical History:   Procedure Laterality Date     NO HISTORY OF SURGERY          MEDICATIONS:   Current Outpatient Prescriptions:      fish oil-omega-3 fatty acids 1000 MG capsule, Take 2 g by mouth daily, Disp: , Rfl:      glucosamine-chondroitin 500-400 MG CAPS per capsule, Take 1 capsule by mouth daily, Disp: , Rfl:      Probiotic Product (PROBIOTIC + OMEGA-3 PO), , Disp: , Rfl:      multivitamin, therapeutic with minerals (MULTI-VITAMIN) TABS tablet, Take 1 tablet by mouth daily, Disp: , Rfl:      ALLERGIES:  No Known Allergies     SOCIAL HISTORY:   Social History     Social History     Marital status:      Spouse name: N/A     Number of children: N/A     Years of education: N/A     Occupational History     Not on file.     Social History Main Topics     Smoking status:  "Former Smoker     Smokeless tobacco: Never Used     Alcohol use 0.0 oz/week     0 Standard drinks or equivalent per week     Drug use: No     Sexual activity: Not Currently     Other Topics Concern     Not on file     Social History Narrative        FAMILY HISTORY:   Family History   Problem Relation Age of Onset     DIABETES Mother      HEART DISEASE Father      DIABETES Maternal Grandfather      Lung Cancer Maternal Grandfather      Bone Cancer Maternal Grandfather      CANCER Paternal Grandfather         EXAM:Vitals: /80 (BP Location: Right arm, Patient Position: Chair, Cuff Size: Adult Large)  Pulse 73  Ht 5' 10\" (1.778 m)  Wt (!) 347 lb 9.6 oz (157.7 kg)  BMI 49.88 kg/m2  BMI= Body mass index is 49.88 kg/(m^2).    General appearance: Patient is alert and fully cooperative with history & exam.  No sign of distress is noted during the visit.     Psychiatric: Affect is pleasant & appropriate.  Patient appears motivated to improve health.     Respiratory: Breathing is regular & unlabored while sitting.     HEENT: Hearing is intact to spoken word.  Speech is clear.  No gross evidence of visual impairment that would impact ambulation.    Pt deferred RLE exam.     Dermatologic: Skin is intact to LLE.  Skin discoloration of lower leg consistent with venous stasis.  No paronychia or evidence of soft tissue infection is noted.     Vascular: DP & PT pulses are intact & regular on the left.  L ankle edema noted.  CFT and skin temperature are normal to both lower extremities.     Neurologic: Lower extremity sensation is intact to light touch.  No evidence of weakness or contracture in the lower extremities.  No evidence of neuropathy.     Musculoskeletal: No calf pain on L.  L ankle exam with tenderness to ATFL and CFL area, joint line.  No gross instability.  No pain with palpation of malleoli, syndesmosis, proximal fibula.  pain to plantar palpation of the left heel at the fascial insertion.  No pain with side " to side heel squeeze.  Patient is ambulatory without assistive device or brace.  No gross ankle deformity noted.  No foot or ankle joint effusion is noted.    XRs of left ankle reviewed with pt.     ASSESSMENT:   Chronic L ankle pain  L plantar fasciitis     PLAN:  Reviewed patient's chart in epic.  Discussed condition and treatment options including pros and cons.    Will order L ankle MRI.  F/u in clinic after scan.    The potential causes and nature of plantar fasciitis were discussed with the patient.  We reviewed the natural history/prognosis of the condition and risks if left untreated.      We discussed possible causes of the condition as it relates to the patients specific situation.      Conservative treatment options were reviewed:  appropriate shoes, avoidance of barefoot walking, inserts/orthoses, stretching, ice, massage, immobilization and NSAIDs.     We also reviewed the option of injection therapy.     After thorough discussion and answering all questions, the patient elected to try icing, stretching, superfeet inserts.          Reno Griffith DPM, FACFAS      Weight management plan: Patient was referred to their PCP to discuss a diet and exercise plan.     DIMAS Cordova MA May 9, 2017 4:42 PM        Again, thank you for allowing me to participate in the care of your patient.        Sincerely,              Reno Griffith DPM

## 2017-05-11 ENCOUNTER — OFFICE VISIT (OUTPATIENT)
Dept: FAMILY MEDICINE | Facility: CLINIC | Age: 37
End: 2017-05-11
Payer: COMMERCIAL

## 2017-05-11 VITALS
WEIGHT: 315 LBS | HEIGHT: 70 IN | OXYGEN SATURATION: 98 % | DIASTOLIC BLOOD PRESSURE: 72 MMHG | SYSTOLIC BLOOD PRESSURE: 125 MMHG | TEMPERATURE: 97.4 F | HEART RATE: 61 BPM | BODY MASS INDEX: 45.1 KG/M2

## 2017-05-11 DIAGNOSIS — Z13.1 SCREENING FOR DIABETES MELLITUS: ICD-10-CM

## 2017-05-11 DIAGNOSIS — I87.2 VENOUS (PERIPHERAL) INSUFFICIENCY: ICD-10-CM

## 2017-05-11 DIAGNOSIS — Z13.220 LIPID SCREENING: ICD-10-CM

## 2017-05-11 DIAGNOSIS — E66.01 MORBID OBESITY DUE TO EXCESS CALORIES (H): ICD-10-CM

## 2017-05-11 DIAGNOSIS — G89.29 CHRONIC MIDLINE THORACIC BACK PAIN: Primary | ICD-10-CM

## 2017-05-11 DIAGNOSIS — M54.6 CHRONIC MIDLINE THORACIC BACK PAIN: Primary | ICD-10-CM

## 2017-05-11 LAB
CHOLEST SERPL-MCNC: 152 MG/DL
GLUCOSE BLD-MCNC: 88 MG/DL (ref 70–99)
HDLC SERPL-MCNC: 47 MG/DL
LDLC SERPL CALC-MCNC: 89 MG/DL
NONHDLC SERPL-MCNC: 105 MG/DL
TRIGL SERPL-MCNC: 79 MG/DL

## 2017-05-11 PROCEDURE — 82947 ASSAY GLUCOSE BLOOD QUANT: CPT | Performed by: INTERNAL MEDICINE

## 2017-05-11 PROCEDURE — 80061 LIPID PANEL: CPT | Performed by: INTERNAL MEDICINE

## 2017-05-11 PROCEDURE — 36415 COLL VENOUS BLD VENIPUNCTURE: CPT | Performed by: INTERNAL MEDICINE

## 2017-05-11 PROCEDURE — 99213 OFFICE O/P EST LOW 20 MIN: CPT | Performed by: INTERNAL MEDICINE

## 2017-05-11 NOTE — MR AVS SNAPSHOT
After Visit Summary   5/11/2017    Lj Lamas    MRN: [id number]           Patient Information     Date Of Birth          1980        Visit Information        Provider Department      5/11/2017 8:00 AM José Kan MD [facility]        Today's Diagnoses     Chronic midline thoracic back pain    -  1    Morbid obesity due to excess calories (H)        Venous (peripheral) insufficiency        Lipid screening        Screening for diabetes mellitus           Follow-ups after your visit        Your next 10 appointments already scheduled     May 16, 2017  6:15 PM CDT   (Arrive by 6:00 PM)   MR ANKLE LEFT W/O & W CONTRAST with [id number]   [facility] ([facility])    [address]  [facility] [address] [phone]           Take your medicines as usual, unless your doctor tells you not to. Bring a list of your current medicines to your exam (including vitamins, minerals and over-the-counter drugs).  You will be given intravenous contrast for this exam. To prepare:   The day before your exam, drink extra fluids at least six 8-ounce glasses (unless your doctor tells you to restrict your fluids).   Have a blood test (creatinine test) within 30 days of your exam. Go to your clinic or Diagnostic Imaging Department for this test.  The MRI machine uses a strong magnet. Please wear clothes without metal (snaps, zippers). A sweatsuit works well, or we may give you a hospital gown.  Please remove any body piercings and hair extensions before you arrive. You will also remove watches, jewelry, hairpins, wallets, dentures, partial dental plates and hearing aids. You may wear contact lenses, and you may be able to wear your rings. We have a safe place to keep your personal items, but it is safer to leave them at home.   **IMPORTANT** THE INSTRUCTIONS BELOW ARE ONLY FOR THOSE PATIENTS WHO HAVE BEEN TOLD THEY WILL RECEIVE SEDATION OR  GENERAL ANESTHESIA DURING THEIR MRI PROCEDURE:  IF YOU WILL RECEIVE SEDATION (take medicine to help you relax during your exam):   You must get the medicine from your doctor before you arrive. Bring the medicine to the exam. Do not take it at home.   Arrive one hour early. Bring someone who can take you home after the test. Your medicine will make you sleepy. After the exam, you may not drive, take a bus or take a taxi by yourself.   No eating 8 hours before your exam. You may have clear liquids up until 4 hours before your exam. (Clear liquids include water, clear tea, black coffee and fruit juice without pulp.)  IF YOU WILL RECEIVE ANESTHESIA (be asleep for your exam):   Arrive 1 1/2 hours early. Bring someone who can take you home after the test. You may not drive, take a bus or take a taxi by yourself.   No eating 8 hours before your exam. You may have clear liquids up until 4 hours before your exam. (Clear liquids include water, clear tea, black coffee and fruit juice without pulp.)  Please call the Imaging Department at your exam site with any questions.            May 19, 2017  1:00 PM CDT   Ultrasound with  [facility] V[facility] Lab   [facility]    [address]  [facility] [address] [phone]            May 19, 2017  2:00 PM CDT   Ultrasound Results with Varinder Dominguez MD   [facility]    [address]  [facility] [address] [phone]              Who to contact     If you have questions or need follow up information about today's clinic visit or your schedule please contact [facility] directly at [phone].  Normal or non-critical lab and imaging results will be communicated to you by MyChart, letter or phone within 4 business days after the clinic has received the results. If you do not hear from us within 7 days, please contact  "the clinic through [employer] or phone. If you have a critical or abnormal lab result, we will notify you by phone as soon as possible.  Submit refill requests through [employer] or call your pharmacy and they will forward the refill request to us. Please allow 3 business days for your refill to be completed.          Additional Information About Your Visit        [employer] Information     [employer] gives you secure access to your electronic health record. If you see a primary care provider, you can also send messages to your care team and make appointments. If you have questions, please call your primary care clinic.  If you do not have a primary care provider, please call [phone] and they will assist you.        Care EveryWhere ID     This is your Care EveryWhere ID. This could be used by other organizations to access your [facility] medical records  [id number]        Your Vitals Were     Pulse Temperature Height Pulse Oximetry BMI (Body Mass Index)       61 97.4  F (36.3  C) (Tympanic) 5' 10\" (1.778 m) 98% 49.93 kg/m2        Blood Pressure from Last 3 Encounters:   05/11/17 125/72   05/09/17 126/80   02/09/17 138/86    Weight from Last 3 Encounters:   05/11/17 (!) 348 lb (157.9 kg)   05/09/17 (!) 347 lb 9.6 oz (157.7 kg)   02/09/17 (!) 361 lb (163.7 kg)              We Performed the Following     Glucose, whole blood     Lipid Profile with reflex to direct LDL          Today's Medication Changes          These changes are accurate as of: 5/11/17  8:29 AM.  If you have any questions, ask your nurse or doctor.               Stop taking these medicines if you haven't already. Please contact your care team if you have questions.     fish oil-omega-3 fatty acids 1000 MG capsule   Stopped by:  José Kan MD                    Primary Care Provider Office Phone # Fax #    José Kan [phone]       [facility] [address] [facility] [address]        Thank you!     Thank you for " choosing [facility]  for your care. Our goal is always to provide you with excellent care. Hearing back from our patients is one way we can continue to improve our services. Please take a few minutes to complete the written survey that you may receive in the mail after your visit with us. Thank you!             Your Updated Medication List - Protect others around you: Learn how to safely use, store and throw away your medicines at www.disposemymeds.org.          This list is accurate as of: 5/11/17  8:29 AM.  Always use your most recent med list.                   Brand Name Dispense Instructions for use    glucosamine-chondroitin 500-400 MG Caps per capsule      Take 1 capsule by mouth daily       Multi-vitamin Tabs tablet      Take 1 tablet by mouth daily       PROBIOTIC + OMEGA-3 PO

## 2017-05-11 NOTE — NURSING NOTE
"Chief Complaint   Patient presents with     RECHECK     3 month.  FASTING for lab work today        Initial /72 (BP Location: Right arm, Patient Position: Chair, Cuff Size: Adult Large)  Pulse 61  Temp 97.4  F (36.3  C) (Tympanic)  Ht 5' 10\" (1.778 m)  Wt (!) 348 lb (157.9 kg)  SpO2 98%  BMI 49.93 kg/m2 Estimated body mass index is 49.93 kg/(m^2) as calculated from the following:    Height as of this encounter: 5' 10\" (1.778 m).    Weight as of this encounter: 348 lb (157.9 kg).  Medication Reconciliation: complete  "

## 2017-05-11 NOTE — LETTER
Dear Mr. Lamas,    The following letter pertains to your most recent diagnostic tests:    -Your cholesterol panel looks healthy.     -Your glucose (blood sugar) is normal      Bottom line:  Your labs look great.  Keep up the great work that you have been doing with weight loss!        If you have any further questions or problems, please contact our office.      Sincerely,    José Kan MD/RIN ZAVALA          Enclosure: Lab Results

## 2017-05-11 NOTE — PROGRESS NOTES
SUBJECTIVE:                                                    Lj Lamas is a 36 year old male who presents to clinic today for the following health issues:      Chief Complaint   Patient presents with     RECHECK     3 month.  FASTING for lab work today          Pleasant 36 year old man with venous insufficiency, varicose veins, chronic foot and ankle pain  who is a nonsmoker presents in follow-up after his physical about 3 months ago. Over the past 3 months, he has worked hard on diet and exercise interventions and managed to lose over 10 pounds. His blood pressure is improved today. He did meet with vascular surgery and podiatry to establish plans for his varicose veins and foot and ankle pain. He describes a traumatic injury to his thoracic spine over 10 years ago during a snowboarding accident. Since then, he has had mild to moderate intermittent mid back pain which she manages with very rare use of over-the-counter analgesics. The pain does not radiate to his legs and there is no associated leg numbness or weakness. However, as he has plans to become more active, he wants to make sure that he will not do further damage to his back by exercising.    Problem list and histories reviewed & adjusted, as indicated.  Additional history: as documented    Patient Active Problem List   Diagnosis     Morbid obesity due to excess calories (H)     Venous (peripheral) insufficiency     Past Surgical History:   Procedure Laterality Date     NO HISTORY OF SURGERY         Social History   Substance Use Topics     Smoking status: Former Smoker     Smokeless tobacco: Never Used     Alcohol use 0.0 oz/week     0 Standard drinks or equivalent per week      Comment: abt 3-4 drinks per month     Family History   Problem Relation Age of Onset     DIABETES Mother      HEART DISEASE Father      DIABETES Maternal Grandfather      Lung Cancer Maternal Grandfather      Bone Cancer Maternal Grandfather      CANCER Paternal  "Grandfather          Current Outpatient Prescriptions   Medication Sig Dispense Refill     glucosamine-chondroitin 500-400 MG CAPS per capsule Take 1 capsule by mouth daily       Probiotic Product (PROBIOTIC + OMEGA-3 PO)        multivitamin, therapeutic with minerals (MULTI-VITAMIN) TABS tablet Take 1 tablet by mouth daily       No Known Allergies    Reviewed and updated as needed this visit by clinical staff       Reviewed and updated as needed this visit by Provider         ROS:  He denies chest pains, palpitations, dyspnea, cough, sputum production, he states that his ankle pain is improving, he denies new swelling in his legs, no new bowel or bladder symptoms    OBJECTIVE:                                                    /72 (BP Location: Right arm, Patient Position: Chair, Cuff Size: Adult Large)  Pulse 61  Temp 97.4  F (36.3  C) (Tympanic)  Ht 5' 10\" (1.778 m)  Wt (!) 348 lb (157.9 kg)  SpO2 98%  BMI 49.93 kg/m2  Body mass index is 49.93 kg/(m^2).  GENERAL: healthy, alert and no distress  BACK:  No tenderness to palpation over the spinous processes of the thoracic and lumbar spine, deep tendon reflexes are 2+ at the bilateral patella and Achilles tendons, there is 5 out of 5 muscle strength in all lower extremity muscle groups, there is normal sensation to light touch in all lower extremity dermatomes, straight leg raise does not elicit radicular symptoms bilaterally, there was no tenderness to palpation over the paraspinous muscles of the lumbar spine. Gait is normal.   NEURO: Normal strength and tone, mentation intact and speech normal  PSYCH: mentation appears normal, affect normal/bright    Diagnostic Test Results:  Labs pending      ASSESSMENT/PLAN:                                                            1. Chronic midline thoracic back pain  Based on history and physical exam today, I do not think there should be restrictions on exercise based on his remote history of back trauma. In " fact, increase physical activity and weight loss will likely result in less back pain.    2. Morbid obesity due to excess calories (H)  Continue diet and exercise modifications to promote weight loss    3. Venous (peripheral) insufficiency  Continue follow-up with [facility] vascular surgeon regarding management of varicose veins    4. Lipid screening    - Lipid Profile with reflex to direct LDL    5. Screening for diabetes mellitus    - Glucose, whole blood    FUTURE APPOINTMENTS:       - Pending labs and symptoms    José Kan MD  [facility]

## 2017-05-12 NOTE — PROGRESS NOTES
The following letter pertains to your most recent diagnostic tests:    -Your cholesterol panel looks healthy.     -Your glucose (blood sugar) is normal      Bottom line:  Your labs look great.  Keep up the great work that you have been doing with weight loss!          Sincerely,    Dr. Kan

## 2017-06-02 ENCOUNTER — APPOINTMENT (OUTPATIENT)
Dept: VASCULAR SURGERY | Facility: CLINIC | Age: 37
End: 2017-06-02
Payer: COMMERCIAL

## 2017-06-02 PROCEDURE — 93971 EXTREMITY STUDY: CPT | Performed by: SURGERY

## 2017-06-02 PROCEDURE — 99212 OFFICE O/P EST SF 10 MIN: CPT | Performed by: SURGERY

## 2017-06-13 ENCOUNTER — OFFICE VISIT (OUTPATIENT)
Dept: PODIATRY | Facility: CLINIC | Age: 37
End: 2017-06-13
Payer: COMMERCIAL

## 2017-06-13 ENCOUNTER — APPOINTMENT (OUTPATIENT)
Dept: VASCULAR SURGERY | Facility: CLINIC | Age: 37
End: 2017-06-13
Payer: COMMERCIAL

## 2017-06-13 VITALS
BODY MASS INDEX: 45.1 KG/M2 | HEIGHT: 70 IN | HEART RATE: 67 BPM | DIASTOLIC BLOOD PRESSURE: 74 MMHG | WEIGHT: 315 LBS | SYSTOLIC BLOOD PRESSURE: 118 MMHG

## 2017-06-13 DIAGNOSIS — I87.8 VENOUS STASIS: ICD-10-CM

## 2017-06-13 DIAGNOSIS — M72.2 PLANTAR FASCIITIS: Primary | ICD-10-CM

## 2017-06-13 PROCEDURE — 99213 OFFICE O/P EST LOW 20 MIN: CPT | Performed by: PODIATRIST

## 2017-06-13 PROCEDURE — 99207 ZZC VEINSOLUTIONS NO CHARGE VISIT: CPT | Performed by: SURGERY

## 2017-06-13 NOTE — PROGRESS NOTES
"PATIENT HISTORY:  Lj Lamas is a 36 year old male who presents to clinic for recheck, MRI discussion.  Pt reports L medial ankle pain continuing.  He is seeing Vascular for saphenous vein procedure.  MRI was neg for ankle pathology.  Plantar fasciitis noted.  He is having some heel pain, worse with activity.  Pain rated as 3-10/10.  Denies new injury.  Nonsmoker.  Works a desk job.  Nondiabetic.     EXAM:Vitals: /74 (BP Location: Left arm, Patient Position: Chair, Cuff Size: Adult Large)  Pulse 67  Ht 5' 10\" (1.778 m)  Wt (!) 348 lb (157.9 kg)  BMI 49.93 kg/m2  BMI= Body mass index is 49.93 kg/(m^2).    General appearance: Patient is alert and fully cooperative with history & exam.  No sign of distress is noted during the visit.     Dermatologic: L lower leg skin discoloration consistent with venous stasis, moreso over saphenous distribution.  No paronychia or evidence of soft tissue infection is noted.     Vascular: DP & PT pulses are intact & regular on the left.  Chronic LE edema, stable.  Varicosities noted. CFT and skin temperature are normal.     Neurologic: Lower extremity sensation is intact to light touch.  No evidence of weakness or contracture in the lower extremities.  No evidence of neuropathy.     Musculoskeletal: Diffuse L medial ankle pain over the saphenous distribution.  No other ankle pain today.  L plantar heel pain at fascial insertion.  Patient is ambulatory without assistive device or brace.  No gross ankle deformity noted.  No foot or ankle joint effusion is noted.    MRI reviewed with pt:    \"IMPRESSION:  1. Extensive soft tissue edema within the subcutaneous tissues  surrounding the left ankle.  2. Marked thickening and increased signal in the proximal plantar  fascia with bone marrow edema in the adjacent calcaneus, as well as a  calcaneal spur and surrounding soft tissue edema, findings most  consistent with plantar fasciitis, correlate clinically.  3. The tendinous and " "ligamentous structures about the left ankle are  intact.  4. No marrow signal abnormalities to suggest fracture, osteonecrosis,  marrow infiltration, or an osteochondral lesion.     AMI TRUONG MD\"     ASSESSMENT: L plantar fasciitis, ankle pain likely related to venous stasis     PLAN:  Reviewed patient's chart in epic.  Discussed condition and treatment options including pros and cons.    No distinct ankle pathology noted.  I suspect his pain is related to his venous condition and I advised continued Vascular f/u.    The potential causes and nature of plantar fasciitis were discussed with the patient.  We reviewed the natural history/prognosis of the condition and risks if left untreated.       We discussed possible causes of the condition as it relates to the patients specific situation.      Conservative treatment options were reviewed:  appropriate shoes, avoidance of barefoot walking, inserts/orthoses, stretching, ice, massage.     We also reviewed the option of injection.     After thorough discussion and answering all questions, the patient elected to try icing, stretching, superfeet.     F/u 1 month prn.       Reno Griffith DPM, FACFAS        "

## 2017-06-13 NOTE — PROGRESS NOTES
Weight management plan: Patient was referred to their PCP to discuss a diet and exercise plan.     DIMAS Cordova MA June 13, 2017 4:38 PM

## 2017-06-13 NOTE — LETTER
"  6/13/2017       RE: Lj Lamas  [address]  [address]  [facility] [address]           Dear Colleague,    Thank you for referring your patient, Lj Lamas, to the [facility]. Please see a copy of my visit note below.    Weight management plan: Patient was referred to their PCP to discuss a diet and exercise plan.     DIMAS Cordova MA June 13, 2017 4:38 PM      PATIENT HISTORY:  Lj Lamas is a 36 year old male who presents to clinic for recheck, MRI discussion.  Pt reports L medial ankle pain continuing.  He is seeing Vascular for saphenous vein procedure.  MRI was neg for ankle pathology.  Plantar fasciitis noted.  He is having some heel pain, worse with activity.  Pain rated as 3-10/10.  Denies new injury.  Nonsmoker.  Works a desk job.  Nondiabetic.     EXAM:Vitals: /74 (BP Location: Left arm, Patient Position: Chair, Cuff Size: Adult Large)  Pulse 67  Ht 5' 10\" (1.778 m)  Wt (!) 348 lb (157.9 kg)  BMI 49.93 kg/m2  BMI= Body mass index is 49.93 kg/(m^2).    General appearance: Patient is alert and fully cooperative with history & exam.  No sign of distress is noted during the visit.     Dermatologic: L lower leg skin discoloration consistent with venous stasis, moreso over saphenous distribution.  No paronychia or evidence of soft tissue infection is noted.     Vascular: DP & PT pulses are intact & regular on the left.  Chronic LE edema, stable.  Varicosities noted. CFT and skin temperature are normal.     Neurologic: Lower extremity sensation is intact to light touch.  No evidence of weakness or contracture in the lower extremities.  No evidence of neuropathy.     Musculoskeletal: Diffuse L medial ankle pain over the saphenous distribution.  No other ankle pain today.  L plantar heel pain at fascial insertion.  Patient is ambulatory without assistive device or brace.  No gross ankle deformity noted.  No foot or ankle joint effusion is noted.    MRI reviewed with " "pt:    \"IMPRESSION:  1. Extensive soft tissue edema within the subcutaneous tissues  surrounding the left ankle.  2. Marked thickening and increased signal in the proximal plantar  fascia with bone marrow edema in the adjacent calcaneus, as well as a  calcaneal spur and surrounding soft tissue edema, findings most  consistent with plantar fasciitis, correlate clinically.  3. The tendinous and ligamentous structures about the left ankle are  intact.  4. No marrow signal abnormalities to suggest fracture, osteonecrosis,  marrow infiltration, or an osteochondral lesion.     AMI TRUONG MD\"     ASSESSMENT: L plantar fasciitis, ankle pain likely related to venous stasis     PLAN:  Reviewed patient's chart in epic.  Discussed condition and treatment options including pros and cons.    No distinct ankle pathology noted.  I suspect his pain is related to his venous condition and I advised continued Vascular f/u.    The potential causes and nature of plantar fasciitis were discussed with the patient.  We reviewed the natural history/prognosis of the condition and risks if left untreated.       We discussed possible causes of the condition as it relates to the patients specific situation.      Conservative treatment options were reviewed:  appropriate shoes, avoidance of barefoot walking, inserts/orthoses, stretching, ice, massage.     We also reviewed the option of injection.     After thorough discussion and answering all questions, the patient elected to try icing, stretching, superfeet.     F/u 1 month prn.       Reno Griffith DPM, FACFAS          Again, thank you for allowing me to participate in the care of your patient.        Sincerely,              Reno Griffith DPM    "

## 2017-06-13 NOTE — PATIENT INSTRUCTIONS
PLANTAR FASCIITIS     What is plantar fasciitis?     Plantar fasciitis is often referred to as heel spurs or heel pain. Plantar fasciitis is a very common problem that affects people of all foot shapes, age, weight and activity level. Pain may be in the arch or on the weight-bearing surface of the heel. The pain may come on without injury or identifiable cause. Pain is generally present when first getting out of bed in the morning or up from a seated break.   What causes plantar fasciitis?     The plantar fascia is a dense fibrous band of tissue that stretches across the bottom surface of the foot. The fascia helps support the foot muscles and arch. Plantar fasciitis is thought to be caused by mechanical strain or overload. Frequent walking without shoes or wearing unsupportive shoes is thought to cause structural overload and ultimately inflammation of the plantar fascia. Some people have heel spurs that can be seen on x-ray. The heel spur is actually evidence of plantar fascitis and is not the cause.   How long will this last?     Plantar fasciitis can last from one day to a lifetime. Some people get intermittent fasciitis that is very short-lived. Others suffer daily for years. Excessive body weight, frequent bare foot walking, long hours on the feet, inadequate shoes, predisposing foot structures and excessive activity such as running are all potential issues that lead to chronic and/or recurring plantar fasciitis. Having plantar fasciitis means that you are forever prone to this problem and will require modification of some of the above factors. Most people seek treatment within one to four months. Healing usually requires a similar one to four month time frame. Healing time is relative to the amount of effort spent treating the problem.   What can I do?     The easiest solution is to stop walking around your home without shoes. Plantar fasciitis is largely a shoe problem. Shoes are either not being worn often  enough or your current shoes are inadequate for your weight, foot structure or activity level. The majority of shoes on the market today are not sufficient to resist development of plantar fasciitis or to promote healing. Assume that your current shoes are inadequate and will need to be replaced. Even high quality shoes wear out with 6 months to one year of frequent use. Weight loss is another option. Losing ten pounds in the next two months may be enough to resolve the problem. Ice applied to the area of pain two to three times per day for ten minutes each session can be very helpful. This should continue until the problem resolves. Achilles tendon stretching is essential. Stretch multiple times daily to promote healing and to prevent recurrence in the future.     What if this does not help?     Medical treatments often include custom arch supports, cortisone injections, physical therapy, splints to be worn in bed, prescription medications and surgery. The home treatments listed above will be necessary regardless of these advanced medical treatments. Surgery is rarely needed but is very helpful in selected cases.     Heel pain in my future?   Plantar fasciitis is highly recurrent. Risk factors often continue, including return to barefoot walking, inadequate shoes, excessive body weight, excessive activities, etc. Your lifestyle and foot structure may predispose you to recurrent plantar fasciitis. A daily prevention regimen can be very helpful. Ongoing use of shoe inserts, careful attention to appropriate shoes, daily Achilles stretching, etc. may prevent recurrence. Prompt attention at the earliest warning signs of heel pain can resolve the problem in as short as a few days.   Below are some exercises for Plantar fasciitis:  Stair exercise  Step on a stair with the ball of your foot and hold your position for at least 15 seconds, then slowly step down with the heels of your foot. You can do this daily and as often  as you want.   Picking the towel  Sit comfortably and then pick at a towel with your toes. Do this at least 10 to 20 times regularly. You can use any object other than a towel as long as the material is soft.  Rolling the bottle or ball  You can get a small ball or bottle and then roll it with your foot. Do this daily for at least 15 to 20 times.   Stretching the calf  Lie on your back, raise one foot, and then point your toes towards the floor. Do this daily for at least 15 to 20 times.   Flex the toes  Sit comfortably and then flex your toes by pointing it towards the floor or towards your body. This will relax and flex your foot and exercise your plantar fascia, the calf, and the Achilles tendon. The inability of the foot to stretch often causes the bunching up of the plantar fascia area, leading to the pain.  Towel stretch  Sling a towel around the ball of the foot and stretch the foot back.  Hold for 15-20 seconds.  Do this 4-5 times/day.    Massaging the calf and the plantar fascia also helps a lot in alleviating the pain and preventing its recurrence.      Over the Counter Inserts    Super Feet are the most common and easiest to find.    Locations include any [employer]oes Store, [employer] in [place] on [address] [address] and in [place] on [address], [facility] Running Room in [place] in [place] on [address], [facility] Running Room in [place] on [address], [employer] in [place] on [address]  and [employer] Sport Shop in [place] in [place] on [address] and in [place] on [address].    Spenco can be found online and at [employer] in [place] in [place] on [address], [employer] Fun in [facility] on [address], Gear Running Store in [place] on [address], [employer] in [place] on [address] and [employer] in [place] on [address].    Power Step can be a little harder to find.  Locations include [employer] in [place] on [address], [place] in [place] in [place], Stop-over Store in [place] on  [address] and online    Walk-Fit - Target     [employer] - [facility]    **  A good high quality over the counter insert can cost around $40-$50.    Body Mass Index (BMI)  Many things can cause foot and ankle problems. Foot structure, activity level, foot mechanics and injuries are common causes of pain.  One very important issue that often goes unmentioned is body weight. Extra weight can cause increased stress on muscles, ligaments, bones and tendons. Sometimes just a few extra pounds is all it takes to put one over her/his threshold. Without reducing that stress, it can be difficult to alleviate pain.   Some people are uncomfortable addressing this issue, but we feel it is important for you to think about it. As Foot & Ankle specialists, our job is addressing the lower extremity problem and possible causes.   Regarding extra body weight, we encourage patients to discuss diet and weight management plans with their primary care doctors. It is this team approach that gives you the best opportunity for pain relief and getting you back on your feet.

## 2017-06-13 NOTE — NURSING NOTE
"Chief Complaint   Patient presents with     Ankle Pain     Follow up MRI of L ankle        Initial /74 (BP Location: Left arm, Patient Position: Chair, Cuff Size: Adult Large)  Pulse 67  Ht 5' 10\" (1.778 m)  Wt (!) 348 lb (157.9 kg)  BMI 49.93 kg/m2 Estimated body mass index is 49.93 kg/(m^2) as calculated from the following:    Height as of this encounter: 5' 10\" (1.778 m).    Weight as of this encounter: 348 lb (157.9 kg).  Medication Reconciliation: saundra Cordova MA June 13, 2017 4:37 PM  "

## 2017-06-13 NOTE — MR AVS SNAPSHOT
After Visit Summary   6/13/2017    Lj Lamas    MRN: [id number]           Patient Information     Date Of Birth          1980        Visit Information        Provider Department      6/13/2017 4:40 PM Reno Griffith DPM [facility]        Today's Diagnoses     Plantar fasciitis    -  1    Venous stasis          Care Instructions    PLANTAR FASCIITIS     What is plantar fasciitis?     Plantar fasciitis is often referred to as heel spurs or heel pain. Plantar fasciitis is a very common problem that affects people of all foot shapes, age, weight and activity level. Pain may be in the arch or on the weight-bearing surface of the heel. The pain may come on without injury or identifiable cause. Pain is generally present when first getting out of bed in the morning or up from a seated break.   What causes plantar fasciitis?     The plantar fascia is a dense fibrous band of tissue that stretches across the bottom surface of the foot. The fascia helps support the foot muscles and arch. Plantar fasciitis is thought to be caused by mechanical strain or overload. Frequent walking without shoes or wearing unsupportive shoes is thought to cause structural overload and ultimately inflammation of the plantar fascia. Some people have heel spurs that can be seen on x-ray. The heel spur is actually evidence of plantar fascitis and is not the cause.   How long will this last?     Plantar fasciitis can last from one day to a lifetime. Some people get intermittent fasciitis that is very short-lived. Others suffer daily for years. Excessive body weight, frequent bare foot walking, long hours on the feet, inadequate shoes, predisposing foot structures and excessive activity such as running are all potential issues that lead to chronic and/or recurring plantar fasciitis. Having plantar fasciitis means that you are forever prone to this problem and will require modification of some of the above  factors. Most people seek treatment within one to four months. Healing usually requires a similar one to four month time frame. Healing time is relative to the amount of effort spent treating the problem.   What can I do?     The easiest solution is to stop walking around your home without shoes. Plantar fasciitis is largely a shoe problem. Shoes are either not being worn often enough or your current shoes are inadequate for your weight, foot structure or activity level. The majority of shoes on the market today are not sufficient to resist development of plantar fasciitis or to promote healing. Assume that your current shoes are inadequate and will need to be replaced. Even high quality shoes wear out with 6 months to one year of frequent use. Weight loss is another option. Losing ten pounds in the next two months may be enough to resolve the problem. Ice applied to the area of pain two to three times per day for ten minutes each session can be very helpful. This should continue until the problem resolves. Achilles tendon stretching is essential. Stretch multiple times daily to promote healing and to prevent recurrence in the future.     What if this does not help?     Medical treatments often include custom arch supports, cortisone injections, physical therapy, splints to be worn in bed, prescription medications and surgery. The home treatments listed above will be necessary regardless of these advanced medical treatments. Surgery is rarely needed but is very helpful in selected cases.     Heel pain in my future?   Plantar fasciitis is highly recurrent. Risk factors often continue, including return to barefoot walking, inadequate shoes, excessive body weight, excessive activities, etc. Your lifestyle and foot structure may predispose you to recurrent plantar fasciitis. A daily prevention regimen can be very helpful. Ongoing use of shoe inserts, careful attention to appropriate shoes, daily Achilles stretching,  etc. may prevent recurrence. Prompt attention at the earliest warning signs of heel pain can resolve the problem in as short as a few days.   Below are some exercises for Plantar fasciitis:  Stair exercise  Step on a stair with the ball of your foot and hold your position for at least 15 seconds, then slowly step down with the heels of your foot. You can do this daily and as often as you want.   Picking the towel  Sit comfortably and then pick at a towel with your toes. Do this at least 10 to 20 times regularly. You can use any object other than a towel as long as the material is soft.  Rolling the bottle or ball  You can get a small ball or bottle and then roll it with your foot. Do this daily for at least 15 to 20 times.   Stretching the calf  Lie on your back, raise one foot, and then point your toes towards the floor. Do this daily for at least 15 to 20 times.   Flex the toes  Sit comfortably and then flex your toes by pointing it towards the floor or towards your body. This will relax and flex your foot and exercise your plantar fascia, the calf, and the Achilles tendon. The inability of the foot to stretch often causes the bunching up of the plantar fascia area, leading to the pain.  Towel stretch  Sling a towel around the ball of the foot and stretch the foot back.  Hold for 15-20 seconds.  Do this 4-5 times/day.    Massaging the calf and the plantar fascia also helps a lot in alleviating the pain and preventing its recurrence.      Over the Counter Inserts    Super Feet are the most common and easiest to find.    Locations include any Mover Store, [employer] Sport[employer]rows [employer] in [place] on [address]  and in [place] on [address], [facility] Running Room in [facility] on [address], [facility] Running Room in [address], [place] in [place] on [address] and [employer] Sport Shop in [facility] on [place] and in [place] on [address].    Monse can be  found online and at [employer] Shoe Shop in [facility] [place] [facility] on [address], Run N' Fun in [facility] on [address], Gear Running Store in [place] on [address], [place] in [place] on [address] and South [employer] in [place] on [address].    Power Step can be a little harder to find.  Locations include [employer] N' Fun in [place] on [address], [place] in [facility] [place], Stop-over Store in [place] on [address] and online    Walk-Fit - Target     [facility]    **  A good high quality over the counter insert can cost around $40-$50.    Body Mass Index (BMI)  Many things can cause foot and ankle problems. Foot structure, activity level, foot mechanics and injuries are common causes of pain.  One very important issue that often goes unmentioned is body weight. Extra weight can cause increased stress on muscles, ligaments, bones and tendons. Sometimes just a few extra pounds is all it takes to put one over her/his threshold. Without reducing that stress, it can be difficult to alleviate pain.   Some people are uncomfortable addressing this issue, but we feel it is important for you to think about it. As Foot & Ankle specialists, our job is addressing the lower extremity problem and possible causes.   Regarding extra body weight, we encourage patients to discuss diet and weight management plans with their primary care doctors. It is this team approach that gives you the best opportunity for pain relief and getting you back on your feet.             Follow-ups after your visit        Your next 10 appointments already scheduled     Jul 07, 2017  1:30 PM CDT   VNUS Closure with Varinder Dominguez MD,  Vein Nurse, Bridgett Kahn,  VEIN PROCEDURE ROOM 1   [facility]    [address]  [facility] [address]   [phone]            Jul 10, 2017  8:00 AM CDT   VNUS Ultrasound 72 Hour with  Vein Vascular Lab   [facility]    [address]  [facility] [address] [phone]            Jul 10, 2017  8:30 AM CDT   Rewrap 48 Hour with  Vein Nurse   [facility]    [address]  [facility] [id number] [phone]              Who to contact     If you have questions or need follow up information about today's clinic visit or your schedule please contact [facility] directly at [phone].  Normal or non-critical lab and imaging results will be communicated to you by SHAPEhart, letter or phone within 4 business days after the clinic has received the results. If you do not hear from us within 7 days, please contact the clinic through Red e Appt or phone. If you have a critical or abnormal lab result, we will notify you by phone as soon as possible.  Submit refill requests through Zazzy or call your pharmacy and they will forward the refill request to us. Please allow 3 business days for your refill to be completed.          Additional Information About Your Visit        Zazzy Information     Zazzy gives you secure access to your electronic health record. If you see a primary care provider, you can also send messages to your care team and make appointments. If you have questions, please call your primary care clinic.  If you do not have a primary care provider, please call [phone] and they will assist you.        Care EveryWhere ID     This is your Care EveryWhere ID. This could be used by other organizations to access your [place] medical records  [id number]        Your Vitals Were     Pulse Height BMI (Body Mass Index)             67 5' 10\" (1.778 m) 49.93 kg/m2          Blood Pressure from Last 3 Encounters:   06/13/17 118/74   05/11/17 125/72   05/09/17 126/80    Weight from Last 3 Encounters:   06/13/17 (!) 348 lb (157.9 kg)   05/11/17 (!) 348 lb (157.9 kg) "   05/09/17 (!) 347 lb 9.6 oz (157.7 kg)              Today, you had the following     No orders found for display       Primary Care Provider Office Phone # Fax #    José Kan [phone]       [facility] [address]  [facility] [address]        Thank you!     Thank you for choosing [facility]  for your care. Our goal is always to provide you with excellent care. Hearing back from our patients is one way we can continue to improve our services. Please take a few minutes to complete the written survey that you may receive in the mail after your visit with us. Thank you!             Your Updated Medication List - Protect others around you: Learn how to safely use, store and throw away your medicines at www.disposemymeds.org.          This list is accurate as of: 6/13/17  4:47 PM.  Always use your most recent med list.                   Brand Name Dispense Instructions for use    glucosamine-chondroitin 500-400 MG Caps per capsule      Take 1 capsule by mouth daily       Multi-vitamin Tabs tablet      Take 1 tablet by mouth daily       PROBIOTIC + OMEGA-3 PO

## 2017-07-07 ENCOUNTER — OFFICE VISIT (OUTPATIENT)
Dept: VASCULAR SURGERY | Facility: CLINIC | Age: 37
End: 2017-07-07
Payer: COMMERCIAL

## 2017-07-07 DIAGNOSIS — L97.929 VENOUS ULCER OF LEFT LEG (H): Primary | ICD-10-CM

## 2017-07-07 DIAGNOSIS — I83.029 VENOUS ULCER OF LEFT LEG (H): Primary | ICD-10-CM

## 2017-07-07 PROCEDURE — 36475 ENDOVENOUS RF 1ST VEIN: CPT | Mod: LT | Performed by: SURGERY

## 2017-07-07 NOTE — MR AVS SNAPSHOT
After Visit Summary   7/7/2017    Lj Lamas    MRN: [id number]           Patient Information     Date Of Birth          1980        Visit Information        Provider Department      7/7/2017 1:30 PM Houseantonio, Bridgett Manrique; Nurse,  Vein; Varinder Dominguez MD;  VEIN PROCEDURE ROOM 1 [facility]      Today's Diagnoses     Venous ulcer of left leg (H)    -  1       Follow-ups after your visit        Your next 10 appointments already scheduled     Jul 10, 2017  8:00 AM CDT   VNUS Ultrasound 72 Hour with  Vein Vascular Lab   [facility]    [address]  [facility] [address] [phone]            Jul 10, 2017  8:30 AM CDT   Rewrap 48 Hour with  Vein Nurse   [facility]    [address]  [facility] [id number] [phone]              Who to contact     If you have questions or need follow up information about today's clinic visit or your schedule please contact [facility] directly at [phone].  Normal or non-critical lab and imaging results will be communicated to you by Coinkitehart, letter or phone within 4 business days after the clinic has received the results. If you do not hear from us within 7 days, please contact the clinic through Coinkitehart or phone. If you have a critical or abnormal lab result, we will notify you by phone as soon as possible.  Submit refill requests through ReconRobotics or call your pharmacy and they will forward the refill request to us. Please allow 3 business days for your refill to be completed.          Additional Information About Your Visit        Coinkitehart Information     ReconRobotics gives you secure access to your electronic health record. If you see a primary care provider, you can also send messages to your care team and make  appointments. If you have questions, please call your primary care clinic.  If you do not have a primary care provider, please call [phone] and they will assist you.        Care EveryWhere ID     This is your Care EveryWhere ID. This could be used by other organizations to access your [place] medical records  [id number]         Blood Pressure from Last 3 Encounters:   06/13/17 118/74   05/11/17 125/72   05/09/17 126/80    Weight from Last 3 Encounters:   06/13/17 (!) 348 lb (157.9 kg)   05/11/17 (!) 348 lb (157.9 kg)   05/09/17 (!) 347 lb 9.6 oz (157.7 kg)              Today, you had the following     No orders found for display       Primary Care Provider Office Phone # Fax #    José Kan [phone]       [facility] [place] [address] [facility] [address]        Equal Access to Services     [facility]: Hadii aad ku hadasho Soomaali, waaxda luqadaha, qaybta kaalmada adeegyada, waxay idiin hayaan nicole verma . So [facility] [phone].    ATENCIÓN: Si ronnla español, tiene a de leon disposición servicios gratuitos de asistencia lingüística. Llame al [phone].    We comply with applicable federal civil rights laws and [place] laws. We do not discriminate on the basis of race, color, national origin, age, disability sex, sexual orientation or gender identity.            Thank you!     Thank you for choosing [facility]  for your care. Our goal is always to provide you with excellent care. Hearing back from our patients is one way we can continue to improve our services. Please take a few minutes to complete the written survey that you may receive in the mail after your visit with us. Thank you!             Your Updated Medication List - Protect others around you: Learn how to safely use, store and throw away your medicines at www.disposemymeds.org.          This list is accurate as of: 7/7/17  4:47 PM.  Always use your most recent med list.                    Brand Name Dispense Instructions for use Diagnosis    glucosamine-chondroitin 500-400 MG Caps per capsule      Take 1 capsule by mouth daily        Multi-vitamin Tabs tablet      Take 1 tablet by mouth daily        PROBIOTIC + OMEGA-3 PO

## 2017-07-07 NOTE — PROGRESS NOTES
Preprocedure diagnosis: Left lower extremity healed venous ulcer with left great saphenous vein reflux and  vein reflux.  Postprocedure diagnosis: Same.    Procedure:  1.  Radiofrequency ablation of incompetent Cockett 2  in left leg.  2.  Review frequency ablation of left great saphenous vein from knee to saphenofemoral junction.    Surgeon: Varinder Dominguez M.D.  Assistant: Coral Kahn.    Anesthesia: Local with oral sedation.    Complications: None.    Number of radiofrequency cycles: 11.    Total length of treatment: 54 cm.    Indication for procedure: This is a 36-year-old male with healed left lower extremity venous ulcers.  On ultrasonography he was found to have left great saphenous vein and  vein incompetence.  His ulcer has healed with external compression therapy.  For prevention of recurrent ulcer radiofrequency ablation is advised.  All risks and benefits of the procedure were discussed with him.  Risks including but not limited to bleeding, infection, recurrent venous hypertension, deep venous thrombosis and recurrent ulceration were discussed.    Procedure details:  Patient was identified by name and date of birth.  He was placed in supine position.  Oral antibiotics were administered.  The left lower assembly was prepped and a sterile surgical field was created.  With the help of ultrasonography the Cockett-2  was identified in the left leg.  Local anesthetic was administered and subcutaneous tissue.   A 3 mm incision was made with a #11 blade.  Under ultrasonographic guidance radiofrequency stylette was placed in the  vein.  Intraluminal position was confirmed with return of venous blood.  Generous amounts of tumescent anesthetic was administered around the  vein.  4 quadrant radiofrequency ablation was performed.  With 1 minute at each quadrant.  The average impedance was between 180 and 280 ohms.  Then the radiofrequency stylette was  removed.  Then the stylet was placed at the origin off the  vein at its confluence with the great saphenous vein.  Tumescent anesthetic was administered at this point.  This segment was treated with 1 minute of radiofrequency ablation with an average impedance of 180-220 ohms.    Then the left great saphenous vein was mapped between the left knee and saphenofemoral junction.  Local anesthetic was administered in the subcutaneous tissue below the knee.  The left great saphenous vein was accessed with a micropuncture needle followed by placement of the microwire.  Short 7-[place] citizen sheath was placed.  The radiofrequency ablation catheter was advanced to the saphenofemoral junction.  It was then pulled back 3.1 cm.  Generous amounts of tumescent anesthetic was administered in the saphenous compartment.  Patient was then placed in Trendelenburg position.  Standard radiofrequency ablation was performed.  The first 3 segments were treated twice.  And a total of 8 segments were treated.  Sheath was removed.  Compression dressing was applied.  Patient tolerated the procedure well.

## 2017-07-07 NOTE — LETTER
[facility] [address] [place].  [address]  [place] [address]  Phone: [phone]  Fax: [phone]        2017    RE:  Lj ESajan Lamas-:  [birth year]    Preprocedure diagnosis: Left lower extremity healed venous ulcer with left great saphenous vein reflux and  vein reflux.  Postprocedure diagnosis: Same.     Procedure:  1.  Radiofrequency ablation of incompetent Cockett 2  in left leg.  2.  Review frequency ablation of left great saphenous vein from knee to saphenofemoral junction.     Surgeon: Varinder Dominguez M.D.  Assistant: Coral Kahn.     Anesthesia: Local with oral sedation.     Complications: None.     Number of radiofrequency cycles: 11.     Total length of treatment: 54 cm.     Indication for procedure: This is a 36-year-old male with healed left lower extremity venous ulcers.  On ultrasonography he was found to have left great saphenous vein and  vein incompetence.  His ulcer has healed with external compression therapy.  For prevention of recurrent ulcer radiofrequency ablation is advised.  All risks and benefits of the procedure were discussed with him.  Risks including but not limited to bleeding, infection, recurrent venous hypertension, deep venous thrombosis and recurrent ulceration were discussed.     Procedure details:  Patient was identified by name and date of birth.  He was placed in supine position.  Oral antibiotics were administered.  The left lower assembly was prepped and a sterile surgical field was created.  With the help of ultrasonography the Cockett-2  was identified in the left leg.  Local anesthetic was administered and subcutaneous tissue.   A 3 mm incision was made with a #11 blade.  Under ultrasonographic guidance radiofrequency stylette was placed in the  vein.  Intraluminal position was confirmed with return of venous blood.  Generous amounts of tumescent anesthetic was administered around  the  vein.  4 quadrant radiofrequency ablation was performed.  With 1 minute at each quadrant.  The average impedance was between 180 and 280 ohms.  Then the radiofrequency stylette was removed.  Then the stylet was placed at the origin off the  vein at its confluence with the great saphenous vein.  Tumescent anesthetic was administered at this point.  This segment was treated with 1 minute of radiofrequency ablation with an average impedance of 180-220 ohms.     Then the left great saphenous vein was mapped between the left knee and saphenofemoral junction.  Local anesthetic was administered in the subcutaneous tissue below the knee.  The left great saphenous vein was accessed with a micropuncture needle followed by placement of the microwire.  Short 7-[place] sheath was placed.  The radiofrequency ablation catheter was advanced to the saphenofemoral junction.  It was then pulled back 3.1 cm.  Generous amounts of tumescent anesthetic was administered in the saphenous compartment.  Patient was then placed in Trendelenburg position.  Standard radiofrequency ablation was performed.  The first 3 segments were treated twice.  And a total of 8 segments were treated.  Sheath was removed. Compression dressing was applied.  Patient tolerated the procedure well.    Varinder Dominguez MD

## 2017-07-10 ENCOUNTER — APPOINTMENT (OUTPATIENT)
Dept: VASCULAR SURGERY | Facility: CLINIC | Age: 37
End: 2017-07-10
Payer: COMMERCIAL

## 2017-07-10 PROCEDURE — 93971 EXTREMITY STUDY: CPT | Performed by: SURGERY

## 2017-07-10 PROCEDURE — 99207 ZZC VEINSOLUTIONS POST OPERATIVE VISIT: CPT | Performed by: SURGERY

## 2017-08-18 ENCOUNTER — APPOINTMENT (OUTPATIENT)
Dept: VASCULAR SURGERY | Facility: CLINIC | Age: 37
End: 2017-08-18
Payer: COMMERCIAL

## 2017-08-18 PROCEDURE — 99207 ZZC VEINSOLUTIONS POST OPERATIVE VISIT: CPT | Performed by: SURGERY

## 2018-03-19 ENCOUNTER — APPOINTMENT (OUTPATIENT)
Dept: GENERAL RADIOLOGY | Facility: CLINIC | Age: 38
End: 2018-03-19
Attending: EMERGENCY MEDICINE
Payer: COMMERCIAL

## 2018-03-19 ENCOUNTER — HOSPITAL ENCOUNTER (EMERGENCY)
Facility: CLINIC | Age: 38
Discharge: HOME | End: 2018-03-19
Attending: EMERGENCY MEDICINE | Admitting: EMERGENCY MEDICINE
Payer: COMMERCIAL

## 2018-03-19 VITALS
DIASTOLIC BLOOD PRESSURE: 84 MMHG | HEIGHT: 70 IN | RESPIRATION RATE: 16 BRPM | OXYGEN SATURATION: 96 % | SYSTOLIC BLOOD PRESSURE: 146 MMHG | TEMPERATURE: 98.1 F | HEART RATE: 72 BPM

## 2018-03-19 DIAGNOSIS — R13.10 DYSPHAGIA, UNSPECIFIED TYPE: ICD-10-CM

## 2018-03-19 PROCEDURE — 99284 EMERGENCY DEPT VISIT MOD MDM: CPT | Mod: 25

## 2018-03-19 PROCEDURE — 25000132 ZZH RX MED GY IP 250 OP 250 PS 637: Performed by: EMERGENCY MEDICINE

## 2018-03-19 PROCEDURE — 31575 DIAGNOSTIC LARYNGOSCOPY: CPT

## 2018-03-19 PROCEDURE — 25000125 ZZHC RX 250: Performed by: EMERGENCY MEDICINE

## 2018-03-19 PROCEDURE — 71046 X-RAY EXAM CHEST 2 VIEWS: CPT

## 2018-03-19 RX ADMIN — PANTOPRAZOLE SODIUM 40 MG: 40 TABLET, DELAYED RELEASE ORAL at 10:50

## 2018-03-19 RX ADMIN — LIDOCAINE HYDROCHLORIDE 10 ML: 20 JELLY TOPICAL at 12:00

## 2018-03-19 RX ADMIN — LIDOCAINE HYDROCHLORIDE 30 ML: 20 SOLUTION ORAL; TOPICAL at 10:26

## 2018-03-19 ASSESSMENT — ENCOUNTER SYMPTOMS
GASTROINTESTINAL NEGATIVE: 1
TROUBLE SWALLOWING: 1
RESPIRATORY NEGATIVE: 1

## 2018-03-19 NOTE — ED AVS SNAPSHOT
Emergency Department    [address] [place] [address]    Phone:  [phone]    Fax:  [phone]                                       Lj Lamas   MRN: [id number]    Department:   Emergency Department   Date of Visit:  3/19/2018           After Visit Summary Signature Page     I have received my discharge instructions, and my questions have been answered. I have discussed any challenges I see with this plan with the nurse or doctor.    ..........................................................................................................................................  Patient/Patient Representative Signature      ..........................................................................................................................................  Patient Representative Print Name and Relationship to Patient    ..................................................               ................................................  Date                                            Time    ..........................................................................................................................................  Reviewed by Signature/Title    ...................................................              ..............................................  Date                                                            Time

## 2018-03-19 NOTE — ED AVS SNAPSHOT
Emergency Department    [address] [facility] [address]    Phone:  [phone]    Fax:  [phone]                                       Lj Lamas   MRN: [id number]    Department:   Emergency Department   Date of Visit:  3/19/2018           Patient Information     Date Of Birth          1980        Your diagnoses for this visit were:     Dysphagia, unspecified type        You were seen by Jason Brito MD.      Follow-up Information     Call Alison Akins MD.    Specialty:  Gastroenterology    Contact information:    [address] [facility] [address]  [facility] [address] [phone]          Follow up with  Emergency Department.    Specialty:  EMERGENCY MEDICINE    Why:  As needed, If symptoms worsen    Contact information:    [address] [facility] [id number] [phone]        Discharge Instructions         Understanding Dysphagia    If you have a problem swallowing foods or liquids, you may have dysphagia. This condition has a number of causes. Your healthcare provider can find out what is causing your problem and help relieve your symptoms.  When You Swallow  Your tongue pushes foods or liquids from your mouth to your throat as you eat or drink and swallow. They then pass down the esophagus (a muscular tube) into the stomach. To keep foods or liquids moving, the esophagus muscles tighten and relax in wavelive motions.  Causes of dysphagia  With dysphagia, foods or liquids do not easily pass down the esophagus. Dysphagia may happen if the esophagus walls thicken, causing a narrowing (stricture) of the passage. Dysphagia can also be caused by any of the following:    A problem in the esophagus, such as an ulcer, stricture, irritation, infection, inflammation, or cancer    Muscles in your mouth, throat, or esophagus that don t work right or are not coordinated    A nerve or brain problem (such as a stroke) that leaves your mouth, tongue, or throat  muscles weak or changes how your muscles coordinate  Common symptoms  If you have dysphagia, you may:    Feel chest pressure or pain when you swallow    Choke or cough when swallowing    Vomit after eating or drinking    Regurgitate food or liquid out your nose     Aspirate (inhale into the lungs) foods or liquids when you swallow    Have fatigue and weight loss  Date Last Reviewed: 7/1/2016 2000-2017 [employer]. [address] [place] [address]. All rights reserved. This information is not intended as a substitute for professional medical care. Always follow your healthcare professional's instructions.          Future Appointments        Provider Department Dept Phone Center    3/22/2018 4:00 PM Brinda Rehman PA-C [facility] [phone]       24 Hour Appointment Hotline       To make an appointment at any [facility], call [phone] ([phone]). If you don't have a family doctor or clinic, we will help you find one. [facility] are conveniently located to serve the needs of you and your family.             Review of your medicines      START taking        Dose / Directions Last dose taken    omeprazole 20 MG CR capsule   Commonly known as:  priLOSEC   Dose:  20 mg   Quantity:  30 capsule        Take 1 capsule (20 mg) by mouth 2 times daily for 15 days   Refills:  0          Our records show that you are taking the medicines listed below. If these are incorrect, please call your family doctor or clinic.        Dose / Directions Last dose taken    CLARITIN PO        Refills:  0                Prescriptions were sent or printed at these locations (1 Prescription)                   Other Prescriptions                Printed at Department/Unit printer (1 of 1)         omeprazole (PRILOSEC) 20 MG CR capsule                Procedures and tests performed during your visit     Chest XR,  PA & LAT      Orders Needing Specimen Collection     None      Pending  Results     No orders found from 3/17/2018 to 3/20/2018.            Pending Culture Results     No orders found from 3/17/2018 to 3/20/2018.            Pending Results Instructions     If you had any lab results that were not finalized at the time of your Discharge, you can call the ED Lab Result RN at [phone]. You will be contacted by this team for any positive Lab results or changes in treatment. The nurses are available 7 days a week from 10A to 6:30P.  You can leave a message 24 hours per day and they will return your call.        Test Results From Your Hospital Stay        3/19/2018 10:42 AM      Narrative     CHEST TWO VIEWS  3/19/2018 10:34 AM     HISTORY: 37-year-old with Heimlich several days ago, now with  difficulty swallowing. Request made for evaluation of  pneumomediastinum, or other abnormality.    COMPARISON: None.        Impression     IMPRESSION: Heart size is normal. No pleural effusion, pneumothorax,  or abnormal area of consolidation. Suspect old healed right  posterolateral rib fracture of the sixth rib.    JAILENE RILEY MD                Clinical Quality Measure: Blood Pressure Screening     Your blood pressure was checked while you were in the emergency department today. The last reading we obtained was  BP: 146/84 . Please read the guidelines below about what these numbers mean and what you should do about them.  If your systolic blood pressure (the top number) is less than 120 and your diastolic blood pressure (the bottom number) is less than 80, then your blood pressure is normal. There is nothing more that you need to do about it.  If your systolic blood pressure (the top number) is 120-139 or your diastolic blood pressure (the bottom number) is 80-89, your blood pressure may be higher than it should be. You should have your blood pressure rechecked within a year by a primary care provider.  If your systolic blood pressure (the top number) is 140 or greater or your diastolic blood  pressure (the bottom number) is 90 or greater, you may have high blood pressure. High blood pressure is treatable, but if left untreated over time it can put you at risk for heart attack, stroke, or kidney failure. You should have your blood pressure rechecked by a primary care provider within the next 4 weeks.  If your provider in the emergency department today gave you specific instructions to follow-up with your doctor or provider even sooner than that, you should follow that instruction and not wait for up to 4 weeks for your follow-up visit.        Thank you for choosing [facility]       Thank you for choosing [facility] for your care. Our goal is always to provide you with excellent care. Hearing back from our patients is one way we can continue to improve our services. Please take a few minutes to complete the written survey that you may receive in the mail after you visit with us. Thank you!        [facility] Information     [address] gives you secure access to your electronic health record. If you see a primary care provider, you can also send messages to your care team and make appointments. If you have questions, please call your primary care clinic.  If you do not have a primary care provider, please call [phone] and they will assist you.        Care EveryWhere ID     This is your Care EveryWhere ID. This could be used by other organizations to access your [facility] medical records  [id number]        Equal Access to Services     MARIO SHEEHAN : Guillermina Narvaez, waaxda luqadaha, qaybta kaalmada adeserena, larry nam. So [facility] [phone].    ATENCIÓN: Si habla español, tiene a de leon disposición servicios gratuitos de asistencia lingüística. Llame al [phone].    We comply with applicable federal civil rights laws and [place] laws. We do not discriminate on the basis of race, color, national origin, age, disability, sex, sexual orientation, or gender  identity.            After Visit Summary       This is your record. Keep this with you and show to your community pharmacist(s) and doctor(s) at your next visit.

## 2018-03-19 NOTE — ED PROVIDER NOTES
"  History     Chief Complaint:  Dysphagia     HPI   Lj Lamas is a 37 year old male who presents for evaluation of dysphagia. Four days ago patient choked on a Power Bar and required the Heimlich maneuver with resolution. Since that time he reports mild dysphagia with solid foods, though he has been able to tolerate liquids. He attempted solid foods again last night (hamburger) and had further dysphagia without apnea/choking. He feels an irritating foreign body type sensation in his throat. The patient denies any dysphonia, vomiting, dyspnea, stridor, significant neck/throat pain, infectious symptoms, or any other acute symptoms. No history of esophageal disease previously.      Allergies:  No Known Allergies     Medications:    loratadine     Past Medical History:    Morbid obesity  Venous insufficiency     Past Surgical History:    History reviewed. No pertinent surgical history.     Family History:    DM  CAD     Social History:  Non-smoker. Social drinker.  Marital Status:   [4]       Review of Systems   HENT: Positive for trouble swallowing.    Respiratory: Negative.    Gastrointestinal: Negative.    All other systems reviewed and are negative.      Physical Exam     Patient Vitals for the past 24 hrs:   BP Temp Temp src Pulse Resp SpO2 Height   03/19/18 0945 146/84 98.1  F (36.7  C) Oral 72 16 96 % 1.765 m (5' 9.5\")        Physical Exam  General: Appears well-developed and well-nourished.   Head: No signs of trauma.   Mouth/Throat: Oropharynx is clear and moist.   Neck: Normal range of motion. No nuchal rigidity. No cervical adenopathy  CV: Normal rate and regular rhythm.    Resp: Effort normal and breath sounds normal. No respiratory distress.   GI: Soft. There is no tenderness.  No rebound or guarding.  Normal bowel sounds.    MSK: Normal range of motion.   Neuro: The patient is alert and oriented.  Speech normal.  GCS 15  Skin: Skin is warm and dry. No rash noted.   Psych: normal mood and " affect. behavior is normal.       Emergency Department Course   Imaging:  Radiographic findings were communicated with the patient who voiced understanding of the findings.  XR Chest 2 views:   Heart size is normal. No pleural effusion, pneumothorax, or abnormal area of consolidation. Suspect old healed right posterolateral rib fracture of the sixth rib. Results per Radiology.      Procedure:  Fiberoptic Procedure Note  Performed by: Jason Brito MD     Procedure: Fiberoptic Laryngoscopy    Indication:  Odynophagia, concern for epiglottitis, angioedema    The more patent nare was prepped with atomized Lidocaine 4%, 1 ml, and Afrin nasal spray.    The fiberoptic scope was advanced under direct visualization down to the level of the glottic structures.    Findings:  1.  The epiglottis is normal  2.  The lingual tonsil is normal  3.  The glottic tissues, cords, and pyriform recesses appear normal.  4.  No foreign body is noted.    There were no complications to the procedure.    Interventions:  1026: GI Cocktail - Maalox 15 mL, Viscous Lidocaine 15 mL, 30 mL suspension PO   1050: pantoprazole 40mg, PO    Emergency Department Course:  Past medical records, nursing notes, and vitals reviewed.   1015: I performed an exam of the patient as documented above.    The above imaging study was obtained. Results above.  The patient was given the above interventions with improvement.    1215: I rechecked patient. I discussed the treatment plan with the patient. He expressed understanding of this plan and consented to discharge. He will be discharged home with instructions for care and follow up. In addition, the patient will return to the emergency department if symptoms persist, worsen, if new symptoms arise or if there is any concern.  All questions were answered.      Impression & Plan      Medical Decision Making:  Lj Lamas is a 37 year old male who presents due to difficulty swallowing.  States a few days ago he  had something stuck in his throat and had to have the Heimlich.  Since then he has had some difficulty with swallowing solids.  On my evaluation, his speech was clear and there is no obvious obstruction on exam.  He felt like there is something fairly high up so I did use a fiberoptic scope and evaluated down to the level of the vocal cords which was clear.  I also did a chest X-ray to ensure there were no signs of pneumomediastinum which was negative and have a low suspicion for this based on his clinical presentation.  I believe the patient likely has irritation from the prior choking episode. He was recommended to use Prilosec and continue with a soft diet.  Also recommend a follow-up with gastroenterology for further evaluation if his symptoms continue and to return for any new or worsening symptoms.    Diagnosis:    ICD-10-CM    1. Dysphagia, unspecified type R13.10        Disposition:   discharged to home    Discharge Medications:  Discharge Medication List as of 3/19/2018 12:22 PM      START taking these medications    Details   omeprazole (PRILOSEC) 20 MG CR capsule Take 1 capsule (20 mg) by mouth 2 times daily for 15 days, Disp-30 capsule, R-0, Local Print             Scribe Disclosure:  I, Rajinder Ventura, am serving as a scribe at 10:18 AM on 3/19/2018 to document services personally performed by Jason Brito MD based on my observations and the provider's statements to me.    Rajinder Ventura  3/19/2018   EMERGENCY DEPARTMENT      Jason Brito MD  03/19/18 [id number]

## 2018-03-19 NOTE — DISCHARGE INSTRUCTIONS
Understanding Dysphagia    If you have a problem swallowing foods or liquids, you may have dysphagia. This condition has a number of causes. Your healthcare provider can find out what is causing your problem and help relieve your symptoms.  When You Swallow  Your tongue pushes foods or liquids from your mouth to your throat as you eat or drink and swallow. They then pass down the esophagus (a muscular tube) into the stomach. To keep foods or liquids moving, the esophagus muscles tighten and relax in wavelive motions.  Causes of dysphagia  With dysphagia, foods or liquids do not easily pass down the esophagus. Dysphagia may happen if the esophagus walls thicken, causing a narrowing (stricture) of the passage. Dysphagia can also be caused by any of the following:    A problem in the esophagus, such as an ulcer, stricture, irritation, infection, inflammation, or cancer    Muscles in your mouth, throat, or esophagus that don t work right or are not coordinated    A nerve or brain problem (such as a stroke) that leaves your mouth, tongue, or throat muscles weak or changes how your muscles coordinate  Common symptoms  If you have dysphagia, you may:    Feel chest pressure or pain when you swallow    Choke or cough when swallowing    Vomit after eating or drinking    Regurgitate food or liquid out your nose     Aspirate (inhale into the lungs) foods or liquids when you swallow    Have fatigue and weight loss  Date Last Reviewed: 7/1/2016 2000-2017 [employer]. [address] [place] [address], [place] [address]. All rights reserved. This information is not intended as a substitute for professional medical care. Always follow your healthcare professional's instructions.

## 2018-03-22 ENCOUNTER — OFFICE VISIT (OUTPATIENT)
Dept: FAMILY MEDICINE | Facility: CLINIC | Age: 38
End: 2018-03-22
Payer: COMMERCIAL

## 2018-03-22 VITALS
BODY MASS INDEX: 45.1 KG/M2 | OXYGEN SATURATION: 97 % | SYSTOLIC BLOOD PRESSURE: 134 MMHG | TEMPERATURE: 99.1 F | DIASTOLIC BLOOD PRESSURE: 90 MMHG | HEIGHT: 70 IN | WEIGHT: 315 LBS | HEART RATE: 72 BPM

## 2018-03-22 DIAGNOSIS — R13.10 DYSPHAGIA, UNSPECIFIED TYPE: Primary | ICD-10-CM

## 2018-03-22 PROCEDURE — 99213 OFFICE O/P EST LOW 20 MIN: CPT | Performed by: PHYSICIAN ASSISTANT

## 2018-03-22 NOTE — MR AVS SNAPSHOT
After Visit Summary   3/22/2018    Lj Lamas    MRN: [id number]           Patient Information     Date Of Birth          1980        Visit Information        Provider Department      3/22/2018 4:00 PM Brinda Rehman PA-C [facility]        Today's Diagnoses     Dysphagia, unspecified type    -  1       Follow-ups after your visit        Additional Services     GASTROENTEROLOGY ADULT REF PROCEDURE ONLY Fall[facility] Gabrielierge [phone]; Dr. DIMAS Porras       Last Lab Result: No results found for: CR  Body mass index is 48.62 kg/(m^2).      Patient will be contacted to schedule procedure.     Please be aware that coverage of these services is subject to the terms and limitations of your health insurance plan.  Call member services at your health plan with any benefit or coverage questions.  Any procedures must be performed at a [facility] facility OR coordinated by your clinic's referral office.    Please bring the following with you to your appointment:    (1) Any X-Rays, CTs or MRIs which have been performed.  Contact the facility where they were done to arrange for  prior to your scheduled appointment.    (2) List of current medications   (3) This referral request   (4) Any documents/labs given to you for this referral                  Who to contact     If you have questions or need follow up information about today's clinic visit or your schedule please contact [facility] directly at [phone].  Normal or non-critical lab and imaging results will be communicated to you by MyChart, letter or phone within 4 business days after the clinic has received the results. If you do not hear from us within 7 days, please contact the clinic through MyChart or phone. If you have a critical or abnormal lab result, we will notify you by phone as soon as possible.  Submit refill requests through Last[employer]fm or call your pharmacy and they will forward the refill request to us.  "Please allow 3 business days for your refill to be completed.          Additional Information About Your Visit        MyChart Information     [employer] gives you secure access to your electronic health record. If you see a primary care provider, you can also send messages to your care team and make appointments. If you have questions, please call your primary care clinic.  If you do not have a primary care provider, please call [phone] and they will assist you.        Care EveryWhere ID     This is your Care EveryWhere ID. This could be used by other organizations to access your [facility] medical records  [id number]        Your Vitals Were     Pulse Temperature Height Pulse Oximetry BMI (Body Mass Index)       72 99.1  F (37.3  C) (Tympanic) 5' 9.5\" (1.765 m) 97% 48.62 kg/m2        Blood Pressure from Last 3 Encounters:   03/22/18 134/90   03/19/18 146/84   06/13/17 118/74    Weight from Last 3 Encounters:   03/22/18 (!) 334 lb (151.5 kg)   06/13/17 (!) 348 lb (157.9 kg)   05/11/17 (!) 348 lb (157.9 kg)              We Performed the Following     GASTROENTEROLOGY ADULT REF PROCEDURE ONLY Fallon Perez [phone]; Dr. DIMAS Porras        Primary Care Provider Office Phone # Fax #    José MACHADO MD Loreta [phone]       [facility] [address] [place] [address] [address] [facility] [address]  [facility] [address]        Equal Access to Services     MARIO SHEEHAN AH: Hadii aad ku hadasho Soomaali, waaxda luqadaha, qaybta kaalmada adeegyada, larry verma . So [facility] [phone].    ATENCIÓN: Si habla español, tiene a de leon disposición servicios gratuitos de asistencia lingüística. Llame al [phone].    We comply with applicable federal civil rights laws and [place] laws. We do not discriminate on the basis of race, color, national origin, age, disability, sex, sexual orientation, or gender identity.            Thank you!     Thank you for choosing [facility]  for your care. Our goal is " always to provide you with excellent care. Hearing back from our patients is one way we can continue to improve our services. Please take a few minutes to complete the written survey that you may receive in the mail after your visit with us. Thank you!             Your Updated Medication List - Protect others around you: Learn how to safely use, store and throw away your medicines at www.disposemymeds.org.          This list is accurate as of 3/22/18  4:39 PM.  Always use your most recent med list.                   Brand Name Dispense Instructions for use Diagnosis    CLARITIN PO           omeprazole 20 MG CR capsule    priLOSEC    30 capsule    Take 1 capsule (20 mg) by mouth 2 times daily for 15 days

## 2018-03-22 NOTE — NURSING NOTE
"Chief Complaint   Patient presents with     Hospital F/U       Initial /90 (BP Location: Right arm, Cuff Size: Adult Large)  Pulse 72  Temp 99.1  F (37.3  C) (Tympanic)  Ht 5' 9.5\" (1.765 m)  Wt (!) 334 lb (151.5 kg)  SpO2 97%  BMI 48.62 kg/m2 Estimated body mass index is 48.62 kg/(m^2) as calculated from the following:    Height as of this encounter: 5' 9.5\" (1.765 m).    Weight as of this encounter: 334 lb (151.5 kg).  Medication Reconciliation: complete     Aviva Lee MA    "

## 2018-03-22 NOTE — PROGRESS NOTES
"  SUBJECTIVE:   Lj Lamas is a 37 year old male who presents to clinic today for the following health issues:      ED/UC Followup:    Facility:   ED   Date of visit: 03/19/18  Reason for visit: dysphagia  Current Status: pt currently has a pill stuck in his throat     Pt feels like there is a pill stuck in his throat.  Started with a bar getting stuck in his throat on 3/15/18 and needed the Heimlich maneuver from a co worker  He was able to eat on 3/15 and 3/16 but really has avoided eating any solids since 3/17/18  He continued to have trouble swallowing so presented to ED on 3/19/18 and had fiberoptic larynoscopy which was normal.  He was prescribed prilosec and told to f/u.  He has no hx of being scoped or having a stricture.  He has some hx of acid reflux and currently taking prilosec without relief    Past Medical History:   Diagnosis Date     Venous (peripheral) insufficiency 2/9/2017     Past Surgical History:   Procedure Laterality Date     NO HISTORY OF SURGERY       Social History   Substance Use Topics     Smoking status: Never Smoker     Smokeless tobacco: Never Used      Comment: occass cigars in college     Alcohol use 0.0 oz/week     0 Standard drinks or equivalent per week      Comment: abt 3-4 drinks per month     Current Outpatient Prescriptions   Medication Sig Dispense Refill     Loratadine (CLARITIN PO)        omeprazole (PRILOSEC) 20 MG CR capsule Take 1 capsule (20 mg) by mouth 2 times daily for 15 days (Patient not taking: Reported on 3/22/2018) 30 capsule 0     No Known Allergies  FAMILY HISTORY NOTED AND REVIEWED    PHYSICAL EXAM:    /90 (BP Location: Right arm, Cuff Size: Adult Large)  Pulse 72  Temp 99.1  F (37.3  C) (Tympanic)  Ht 5' 9.5\" (1.765 m)  Wt (!) 334 lb (151.5 kg)  SpO2 97%  BMI 48.62 kg/m2    Patient appears non toxic  Throat: no erythema or exudates or foreign body  Voice is normal; not hoarse  Swallowing is normal on exam.  Lungs; CTA bilat  Heart; " RRR    Assessment and Plan:     (R13.10) Dysphagia, unspecified type  (primary encounter diagnosis)  Comment: recd EGD; called and LM for Dr. Porras who can see pt for this.  Plan: GASTROENTEROLOGY ADULT REF PROCEDURE ONLY         Fallon Perez [phone]; Dr. DIMAS Rehman PA-C

## 2018-03-23 ENCOUNTER — TELEPHONE (OUTPATIENT)
Dept: GASTROENTEROLOGY | Facility: CLINIC | Age: 38
End: 2018-03-23

## 2018-03-23 NOTE — TELEPHONE ENCOUNTER
Patient scheduled for EGd    Indication for procedure. Dysphagia    Referring Provider. Dr. Kan    ? no    Arrival time verified? Yes 9:30    Facility location verified? [address] [facility]    Instructions given regarding prep and procedure    Prep Type NPO for 6 hours prior procedure    Are you taking any anticoagulants or blood thinners? no    Instructions given? Yes, verbally and written    Electronic implanted devices? no    Pre procedure teaching completed? Yes    Transportation from procedure? Yes, patient will provide contact    H&P / Pre op physical completed? Na    Carlie Lucas RN

## 2018-03-28 ENCOUNTER — HOSPITAL ENCOUNTER (OUTPATIENT)
Facility: CLINIC | Age: 38
Discharge: HOME | End: 2018-03-28
Attending: INTERNAL MEDICINE | Admitting: INTERNAL MEDICINE
Payer: COMMERCIAL

## 2018-03-28 ENCOUNTER — CARE COORDINATION (OUTPATIENT)
Dept: GASTROENTEROLOGY | Facility: CLINIC | Age: 38
End: 2018-03-28

## 2018-03-28 ENCOUNTER — TELEPHONE (OUTPATIENT)
Dept: GASTROENTEROLOGY | Facility: CLINIC | Age: 38
End: 2018-03-28

## 2018-03-28 VITALS
SYSTOLIC BLOOD PRESSURE: 113 MMHG | OXYGEN SATURATION: 94 % | DIASTOLIC BLOOD PRESSURE: 68 MMHG | RESPIRATION RATE: 22 BRPM

## 2018-03-28 DIAGNOSIS — R13.10 DYSPHAGIA, UNSPECIFIED TYPE: Primary | ICD-10-CM

## 2018-03-28 DIAGNOSIS — K22.70 BARRETT'S ESOPHAGUS: Primary | ICD-10-CM

## 2018-03-28 PROCEDURE — 25000128 H RX IP 250 OP 636: Performed by: INTERNAL MEDICINE

## 2018-03-28 PROCEDURE — 43235 EGD DIAGNOSTIC BRUSH WASH: CPT | Performed by: INTERNAL MEDICINE

## 2018-03-28 PROCEDURE — 25000125 ZZHC RX 250: Performed by: INTERNAL MEDICINE

## 2018-03-28 PROCEDURE — G0500 MOD SEDAT ENDO SERVICE >5YRS: HCPCS | Performed by: INTERNAL MEDICINE

## 2018-03-28 RX ORDER — ONDANSETRON 2 MG/ML
4 INJECTION INTRAMUSCULAR; INTRAVENOUS
Status: DISCONTINUED | OUTPATIENT
Start: 2018-03-28 | End: 2018-03-28 | Stop reason: HOSPADM

## 2018-03-28 RX ORDER — LIDOCAINE 40 MG/G
CREAM TOPICAL
Status: DISCONTINUED | OUTPATIENT
Start: 2018-03-28 | End: 2018-03-28 | Stop reason: HOSPADM

## 2018-03-28 RX ORDER — FENTANYL CITRATE 50 UG/ML
INJECTION, SOLUTION INTRAMUSCULAR; INTRAVENOUS PRN
Status: DISCONTINUED | OUTPATIENT
Start: 2018-03-28 | End: 2018-03-28 | Stop reason: HOSPADM

## 2018-03-28 NOTE — IP AVS SNAPSHOT
[facility], [place], Endoscopy    [address] [facility] [address]    Phone:  [phone]                                       After Visit Summary   3/28/2018    Lj Lamas    MRN: [id number]           After Visit Summary Signature Page     I have received my discharge instructions, and my questions have been answered. I have discussed any challenges I see with this plan with the nurse or doctor.    ..........................................................................................................................................  Patient/Patient Representative Signature      ..........................................................................................................................................  Patient Representative Print Name and Relationship to Patient    ..................................................               ................................................  Date                                            Time    ..........................................................................................................................................  Reviewed by Signature/Title    ...................................................              ..............................................  Date                                                            Time

## 2018-03-28 NOTE — IP AVS SNAPSHOT
MRN:[id number]                      After Visit Summary   3/28/2018    Lj Lamas    MRN: [id number]           Thank you!     Thank you for choosing [facility] for your care. Our goal is always to provide you with excellent care. Hearing back from our patients is one way we can continue to improve our services. Please take a few minutes to complete the written survey that you may receive in the mail after you visit with us. Thank you!        Patient Information     Date Of Birth          1980        About your hospital stay     You were admitted on:  March 28, 2018 You last received care in the:  [facility], Endoscopy    You were discharged on:  March 28, 2018       Who to Call     For medical emergencies, please call 911.  For non-urgent questions about your medical care, please call your primary care provider or clinic, [phone]  For questions related to your surgery, please call your surgery clinic        Attending Provider     Provider Charbel Barroso MD Gastroenterology       Primary Care Provider Office Phone # Fax #    José CARTER Kan [phone]      Further instructions from your care team       Discharge Instructions after  Upper Endoscopy (EGD)    Activity and Diet  You were given medicine for pain. You may be dizzy or sleepy.  For 24 hours:    Do not drive or use heavy equipment.    Do not make important decisions.    Do not drink any alcohol.  __x_ You may return to your regular diet.    Discomfort  You may have a sore throat for 2 to 3 days. It may help to:    Avoid hot liquids for 24 hours.    Use sore throat lozenges.    Gargle as needed with salt water up to 4 times a day. Mix 1 cup of warm water  with 1 teaspoon of salt. Do not swallow.      You may take Tylenol (acetaminophen) for pain unless your doctor has told you not to.    Do not take aspirin or ibuprofen (Advil, Motrin) or other NSAIDS  (anti-inflammatory drugs) for ___  days.    Follow-up  ___ We took small tissue samples for study. If you do not have a follow-up visit scheduled,  call your provider s office in 2 weeks for the results.    Other instructions________________________________________________________    When to call us:  Problems are rare. Call right away if you have:    Unusual throat pain or trouble swallowing    Unusual pain in belly or chest that is not relieved by belching or passing air    Black stools (tar-like looking bowel movement)    Temperature above 100.6  F. (37.5  C).    If you vomit blood or have severe pain, go to an emergency room.    If you have questions, call:  Monday to Friday, 7 a.m. to 4:30 p.m.: Endoscopy: [phone] (We may have to call you back)    After hours: Hospital: [phone] (Ask for the GI fellow on call)    Pending Results     No orders found from 3/26/2018 to 3/29/2018.            Admission Information     Date & Time Provider Department Dept. Phone    3/28/2018 Charbel Wise MD [facility], [place], Endoscopy [phone]      Your Vitals Were     Blood Pressure Respirations Pulse Oximetry             169/97 9 95%         MyChart Information     [employer] gives you secure access to your electronic health record. If you see a primary care provider, you can also send messages to your care team and make appointments. If you have questions, please call your primary care clinic.  If you do not have a primary care provider, please call [phone] and they will assist you.        Care EveryWhere ID     This is your Care EveryWhere ID. This could be used by other organizations to access your [facility] medical records  [id number]        Equal Access to Services     MARIO NAM: Guillermina Narvaez, wasrini luqlyle, qaybta kaalmasean gaviria, larry nam. So [facility] [phone].    ATENCIÓN: Si habla español, tiene a de leon disposición servicios gratuitos de asistencia lingüística. Llame al  [phone].    We comply with applicable federal civil rights laws and [place] laws. We do not discriminate on the basis of race, color, national origin, age, disability, sex, sexual orientation, or gender identity.               Review of your medicines      UNREVIEWED medicines. Ask your doctor about these medicines        Dose / Directions    CLARITIN PO        Refills:  0       omeprazole 20 MG CR capsule   Commonly known as:  priLOSEC        Dose:  20 mg   Take 1 capsule (20 mg) by mouth 2 times daily for 15 days   Quantity:  30 capsule   Refills:  0                Protect others around you: Learn how to safely use, store and throw away your medicines at www.disposemymeds.org.             Medication List: This is a list of all your medications and when to take them. Check marks below indicate your daily home schedule. Keep this list as a reference.      Medications           Morning Afternoon Evening Bedtime As Needed    CLARITIN PO                                omeprazole 20 MG CR capsule   Commonly known as:  priLOSEC   Take 1 capsule (20 mg) by mouth 2 times daily for 15 days

## 2018-03-28 NOTE — TELEPHONE ENCOUNTER
Concepcion: Thanks for seeing Lj in GI lab today.  Just to clarify plan.    He should take omeprazole 20 mg bid until repeat endoscopy.    Repeat EGD with me in GI lab in 2 months with IV sedation.    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    He will call if unable to swallow pills. I did not see any reason that this should happen. If unable to swallow pills, will need to get esophagram (with barium tablet) and switch to prevacid solutabs.    He was given reflux precaution handout.    Thanks,    Joo Wise

## 2018-03-28 NOTE — OR NURSING
Procedure: EGD no intervention  Sedation: conscious sedation   Specimens: none.   O2: room air  Tolerated procedure: well  Pt to recovery area in stable condition accompanied by RN.   Other:  Repeat in 1-2 months    Harmony Sullivan RN

## 2018-03-28 NOTE — DISCHARGE INSTRUCTIONS
Discharge Instructions after  Upper Endoscopy (EGD)    Activity and Diet  You were given medicine for pain. You may be dizzy or sleepy.  For 24 hours:    Do not drive or use heavy equipment.    Do not make important decisions.    Do not drink any alcohol.  __x_ You may return to your regular diet.    Discomfort  You may have a sore throat for 2 to 3 days. It may help to:    Avoid hot liquids for 24 hours.    Use sore throat lozenges.    Gargle as needed with salt water up to 4 times a day. Mix 1 cup of warm water  with 1 teaspoon of salt. Do not swallow.      You may take Tylenol (acetaminophen) for pain unless your doctor has told you not to.    Do not take aspirin or ibuprofen (Advil, Motrin) or other NSAIDS  (anti-inflammatory drugs) for ___ days.    Follow-up  ___ We took small tissue samples for study. If you do not have a follow-up visit scheduled,  call your provider s office in 2 weeks for the results.    Other instructions________________________________________________________    When to call us:  Problems are rare. Call right away if you have:    Unusual throat pain or trouble swallowing    Unusual pain in belly or chest that is not relieved by belching or passing air    Black stools (tar-like looking bowel movement)    Temperature above 100.6  F. (37.5  C).    If you vomit blood or have severe pain, go to an emergency room.    If you have questions, call:  Monday to Friday, 7 a.m. to 4:30 p.m.: Endoscopy: [phone] (We may have to call you back)    After hours: Hospital: [phone] (Ask for the GI fellow on call)

## 2018-03-28 NOTE — PROGRESS NOTES
Care Coordination   GI Service and Surgical Oncology    Met with patient in endoscopy at the request of Dr. Wise.  Provided him with information regarding Barretts and also GERD.  I have asked him to call me on Friday with update on how he is able to swallow his pills.  He voiced understanding.    I have asked the patient to call with any additional questions or concerns and have provided my contact information.    Concepcion BILLINGSN, HNBC, STAR-T  RN Care Coordinator  Surgical Oncology and GI service  Ph: [phone]  FAX: [phone]

## 2018-03-29 ENCOUNTER — TELEPHONE (OUTPATIENT)
Dept: GASTROENTEROLOGY | Facility: CLINIC | Age: 38
End: 2018-03-29

## 2018-03-29 NOTE — TELEPHONE ENCOUNTER
Message left for patient to call back to schedule EGD with IV sedation with Dr. Wise in 2 months.    Carlie Lucas RN

## 2018-04-03 ENCOUNTER — TELEPHONE (OUTPATIENT)
Dept: GASTROENTEROLOGY | Facility: CLINIC | Age: 38
End: 2018-04-03

## 2018-04-03 ENCOUNTER — CARE COORDINATION (OUTPATIENT)
Dept: GASTROENTEROLOGY | Facility: CLINIC | Age: 38
End: 2018-04-03

## 2018-04-03 DIAGNOSIS — R13.10 DIFFICULTY SWALLOWING: Primary | ICD-10-CM

## 2018-04-03 LAB — UPPER GI ENDOSCOPY: NORMAL

## 2018-04-03 NOTE — TELEPHONE ENCOUNTER
[facility] Call Center    Phone Message    May a detailed message be left on voicemail: no    Reason for Call: Other: Scheduling     Action Taken: Conference call with Radiology, XR Esophogram  Scheduled for Wednesday April 4,2018 @ 3:30 at the [facility].    Prep Given by Radiology.

## 2018-04-05 ENCOUNTER — CARE COORDINATION (OUTPATIENT)
Dept: GASTROENTEROLOGY | Facility: CLINIC | Age: 38
End: 2018-04-05

## 2018-04-09 ENCOUNTER — TELEPHONE (OUTPATIENT)
Dept: GASTROENTEROLOGY | Facility: CLINIC | Age: 38
End: 2018-04-09

## 2018-04-10 ENCOUNTER — RADIANT APPOINTMENT (OUTPATIENT)
Dept: GENERAL RADIOLOGY | Facility: CLINIC | Age: 38
End: 2018-04-10
Attending: INTERNAL MEDICINE
Payer: COMMERCIAL

## 2018-04-10 DIAGNOSIS — R13.10 DIFFICULTY SWALLOWING: ICD-10-CM

## 2018-04-11 ENCOUNTER — TELEPHONE (OUTPATIENT)
Dept: GASTROENTEROLOGY | Facility: CLINIC | Age: 38
End: 2018-04-11

## 2018-04-11 DIAGNOSIS — R13.19 ESOPHAGEAL DYSPHAGIA: Primary | ICD-10-CM

## 2018-04-11 NOTE — TELEPHONE ENCOUNTER
Called pt and discussed essentially normal esophagram.    Still having swallowing difficulty including difficulty with omeprazole pills.    Script sent for prevacid solutabs in place of omeprazole.    Concepcion- please arrange for speech pathology evaluation re suspected oropharyngeal dysphagia.    RONAK Wise MD  Associate Professor of Medicine  Division of Gastroenterology, Hepatology and Nutrition  [facility]

## 2018-04-12 ENCOUNTER — TELEPHONE (OUTPATIENT)
Dept: GASTROENTEROLOGY | Facility: CLINIC | Age: 38
End: 2018-04-12

## 2018-04-12 ENCOUNTER — CARE COORDINATION (OUTPATIENT)
Dept: GASTROENTEROLOGY | Facility: CLINIC | Age: 38
End: 2018-04-12

## 2018-04-12 DIAGNOSIS — K22.70 BARRETT'S ESOPHAGUS: Primary | ICD-10-CM

## 2018-04-12 DIAGNOSIS — R13.10 DIFFICULTY SWALLOWING: Primary | ICD-10-CM

## 2018-04-12 NOTE — TELEPHONE ENCOUNTER
Previcid 30 mg capsules bid   Patient unable to swallow pills, will open and place in applesauce  Generic brand ok

## 2018-04-13 RX ORDER — LANSOPRAZOLE 30 MG/1
30 CAPSULE, DELAYED RELEASE ORAL 2 TIMES DAILY
Qty: 30 CAPSULE | Refills: 3 | Status: SHIPPED | OUTPATIENT
Start: 2018-04-13 | End: 2020-01-17

## 2018-04-13 NOTE — TELEPHONE ENCOUNTER
No current rx on medication list for Prevacid (Lansoprazole) 30mg capsules. There is Prevacid (Lansoprazole) Solu-tabs, and Prilosec (Omeprazole) Capsules. Is P/A supposed to be for Prevacid Capsules (opening capsules and emptying contents)? If so , would need a current rx on file. If not, and should be for Omeprazole capsule, please let us know.

## 2018-04-13 NOTE — TELEPHONE ENCOUNTER
Patient is scheduled for Swallow Study appointment   on  (Date) 4/16 (Time) 2:45  How was patient notified? Phone Call

## 2018-04-13 NOTE — TELEPHONE ENCOUNTER
Central Prior Authorization Team   Phone: [phone]      PA Initiation    Medication: LANsoprazole (PREVACID) 30 MG CR capsule  Insurance Company: [employer] - Phone [phone] Fax [phone]  Pharmacy Filling the Rx: [facility] [id number] [place] [address] - [address] & [facility]  Filling Pharmacy Phone: [phone]  Filling Pharmacy Fax: [phone]  Start Date: 4/13/2018

## 2018-04-16 ENCOUNTER — THERAPY VISIT (OUTPATIENT)
Dept: SPEECH THERAPY | Facility: CLINIC | Age: 38
End: 2018-04-16
Attending: INTERNAL MEDICINE
Payer: COMMERCIAL

## 2018-04-16 DIAGNOSIS — R13.12 OROPHARYNGEAL DYSPHAGIA: ICD-10-CM

## 2018-04-16 NOTE — MR AVS SNAPSHOT
After Visit Summary   4/16/2018    Lj Lamas    MRN: [id number]           Patient Information     Date Of Birth          1980        Visit Information        Provider Department      4/16/2018 2:45 PM Delphine Beth SLP  Health Rehab        Today's Diagnoses     Oropharyngeal dysphagia           Follow-ups after your visit        Your next 10 appointments already scheduled     Sep 05, 2018   Procedure with Charbel Wise MD   [facility], [place], Endoscopy ([facility], [facility])    [address]  [facility] [place] [id number] [phone]           The [facility] is located on the corner of [address] [facility] and [facility] on the [facility]. It is easily accessible from virtually any point in the [facility] area, via Groupspeak and [address].              Who to contact     Please call your clinic at [phone] to:    Ask questions about your health    Make or cancel appointments    Discuss your medicines    Learn about your test results    Speak to your doctor            Additional Information About Your Visit        [employer] Information     Pri[employer]el gives you secure access to your electronic health record. If you see a primary care provider, you can also send messages to your care team and make appointments. If you have questions, please call your primary care clinic.  If you do not have a primary care provider, please call [phone] and they will assist you.      Prixtel is an electronic gateway that provides easy, online access to your medical records. With Prixtel, you can request a clinic appointment, read your test results, renew a prescription or communicate with your care team.     To access your existing account, please contact your [facility] or call [phone] for assistance.        Care EveryWhere ID     This is your Care EveryWhere ID. This could be used by other  organizations to access your [facility] medical records  [id number]         Blood Pressure from Last 3 Encounters:   05/25/18 110/75   05/24/18 104/57   05/14/18 123/74    Weight from Last 3 Encounters:   05/23/18 141.6 kg (312 lb 2.7 oz)   05/14/18 (!) 151.5 kg (334 lb)   03/22/18 (!) 151.5 kg (334 lb)              Today, you had the following     No orders found for display       Primary Care Provider Office Phone # Fax #    José Kan [phone] [address]  [place] [address]        Equal Access to Services     [facility] : Hadii tacos denny hadasho Socristhian, waaxda luqadaha, qaybta kaalmada adeshireenyada, larry verma . So [facility] [phone].    ATENCIÓN: Si habla español, tiene a de leon disposición servicios gratuitos de asistencia lingüística. Llame al [phone].    We comply with applicable federal civil rights laws and [place] laws. We do not discriminate on the basis of race, color, national origin, age, disability, sex, sexual orientation, or gender identity.            Thank you!     Thank you for choosing [facility]  for your care. Our goal is always to provide you with excellent care. Hearing back from our patients is one way we can continue to improve our services. Please take a few minutes to complete the written survey that you may receive in the mail after your visit with us. Thank you!             Your Updated Medication List - Protect others around you: Learn how to safely use, store and throw away your medicines at www.disposemymeds.org.          This list is accurate as of 4/16/18 11:59 PM.  Always use your most recent med list.                   Brand Name Dispense Instructions for use Diagnosis    CLARITIN PO           * LANsoprazole 30 MG ODT tab    PREVACID SOLUTAB    60 tablet    Take 1 tablet (30 mg) by mouth 2 times daily (before meals)    Esophageal dysphagia       * LANsoprazole 30 MG ODT tab    PREVACID SOLUTAB    30  tablet    Take 1 tablet (30 mg) by mouth daily    Breen's esophagus       * LANsoprazole 30 MG CR capsule    PREVACID    30 capsule    Take 1 capsule (30 mg) by mouth 2 times daily Open capsule put in apple sauce    Breen's esophagus       * Notice:  This list has 3 medication(s) that are the same as other medications prescribed for you. Read the directions carefully, and ask your doctor or other care provider to review them with you.

## 2018-04-19 ENCOUNTER — TRANSFERRED RECORDS (OUTPATIENT)
Dept: HEALTH INFORMATION MANAGEMENT | Facility: CLINIC | Age: 38
End: 2018-04-19

## 2018-04-19 NOTE — TELEPHONE ENCOUNTER
Prior Authorization Approval    Authorization Effective Date: 4/13/2018  Authorization Expiration Date: 4/13/2019  Medication: LANsoprazole (PREVACID) 30 MG CR capsule-APPROVED  Approved Dose/Quantity:  Reference #: CMM KEY [id number]   Insurance Company: [employer] - Phone [phone] Fax [phone]  Expected CoPay:       CoPay Card Available:      Foundation Assistance Needed:    Which Pharmacy is filling the prescription (Not needed for infusion/clinic administered): [facility] [address] [place] [address] [id number] [place] & [facility]  Pharmacy Notified: Yes  Patient Notified: No

## 2018-04-23 ENCOUNTER — TRANSFERRED RECORDS (OUTPATIENT)
Dept: HEALTH INFORMATION MANAGEMENT | Facility: CLINIC | Age: 38
End: 2018-04-23

## 2018-05-10 ENCOUNTER — TELEPHONE (OUTPATIENT)
Dept: GASTROENTEROLOGY | Facility: CLINIC | Age: 38
End: 2018-05-10

## 2018-05-14 ENCOUNTER — OFFICE VISIT (OUTPATIENT)
Dept: FAMILY MEDICINE | Facility: CLINIC | Age: 38
End: 2018-05-14
Payer: COMMERCIAL

## 2018-05-14 VITALS
HEART RATE: 79 BPM | TEMPERATURE: 98.2 F | HEIGHT: 70 IN | RESPIRATION RATE: 18 BRPM | BODY MASS INDEX: 45.1 KG/M2 | DIASTOLIC BLOOD PRESSURE: 74 MMHG | OXYGEN SATURATION: 96 % | SYSTOLIC BLOOD PRESSURE: 123 MMHG | WEIGHT: 315 LBS

## 2018-05-14 DIAGNOSIS — Z01.818 PREOP GENERAL PHYSICAL EXAM: Primary | ICD-10-CM

## 2018-05-14 DIAGNOSIS — S83.512D RUPTURE OF ANTERIOR CRUCIATE LIGAMENT OF LEFT KNEE, SUBSEQUENT ENCOUNTER: ICD-10-CM

## 2018-05-14 DIAGNOSIS — R73.01 IMPAIRED FASTING GLUCOSE: ICD-10-CM

## 2018-05-14 LAB
ERYTHROCYTE [DISTWIDTH] IN BLOOD BY AUTOMATED COUNT: 13 % (ref 10–15)
HBA1C MFR BLD: 5 % (ref 0–5.6)
HCT VFR BLD AUTO: 48.6 % (ref 40–53)
HGB BLD-MCNC: 15.9 G/DL (ref 13.3–17.7)
MCH RBC QN AUTO: 30 PG (ref 26.5–33)
MCHC RBC AUTO-ENTMCNC: 32.7 G/DL (ref 31.5–36.5)
MCV RBC AUTO: 92 FL (ref 78–100)
PLATELET # BLD AUTO: 216 10E9/L (ref 150–450)
RBC # BLD AUTO: 5.3 10E12/L (ref 4.4–5.9)
WBC # BLD AUTO: 8 10E9/L (ref 4–11)

## 2018-05-14 PROCEDURE — 36415 COLL VENOUS BLD VENIPUNCTURE: CPT | Performed by: INTERNAL MEDICINE

## 2018-05-14 PROCEDURE — 83036 HEMOGLOBIN GLYCOSYLATED A1C: CPT | Performed by: INTERNAL MEDICINE

## 2018-05-14 PROCEDURE — 85027 COMPLETE CBC AUTOMATED: CPT | Performed by: INTERNAL MEDICINE

## 2018-05-14 PROCEDURE — 99214 OFFICE O/P EST MOD 30 MIN: CPT | Performed by: INTERNAL MEDICINE

## 2018-05-14 RX ORDER — ACETAMINOPHEN 325 MG/1
1000 TABLET ORAL
COMMUNITY
Start: 2018-04-05 | End: 2020-08-24

## 2018-05-14 RX ORDER — IBUPROFEN 600 MG/1
600 TABLET, FILM COATED ORAL
COMMUNITY
Start: 2018-04-05 | End: 2020-07-22

## 2018-05-14 RX ORDER — OXYCODONE HYDROCHLORIDE 5 MG/1
TABLET ORAL
Refills: 0 | Status: ON HOLD | COMMUNITY
Start: 2018-04-05 | End: 2018-05-23

## 2018-05-14 RX ORDER — SENNOSIDES A AND B 8.6 MG/1
TABLET, FILM COATED ORAL
Status: ON HOLD | COMMUNITY
Start: 2018-04-05 | End: 2018-05-23

## 2018-05-14 NOTE — LETTER
To Whom it May Concern:        Please note that it is medically necessary for Lj Lamas to use a wedge pillow, toilet commode riser and shower/bath bench.          Sincerely,      José Kan MD  [facility]

## 2018-05-14 NOTE — MR AVS SNAPSHOT
After Visit Summary   5/14/2018    Lj Lamas    MRN: [id number]           Patient Information     Date Of Birth          1980        Visit Information        Provider Department      5/14/2018 4:30 PM José Kan MD [facility]        Today's Diagnoses     Preop general physical exam    -  1    Rupture of anterior cruciate ligament of left knee, subsequent encounter        Impaired fasting glucose          Care Instructions      Before Your Surgery      Call your surgeon if there is any change in your health. This includes signs of a cold or flu (such as a sore throat, runny nose, cough, rash or fever).    Do not smoke, drink alcohol or take over the counter medicine (unless your surgeon or primary care doctor tells you to) for the 24 hours before and after surgery.    If you take prescribed drugs: Follow your doctor s orders about which medicines to take and which to stop until after surgery.    Eating and drinking prior to surgery: follow the instructions from your surgeon    Take a shower or bath the night before surgery. Use the soap your surgeon gave you to gently clean your skin. If you do not have soap from your surgeon, use your regular soap. Do not shave or scrub the surgery site.  Wear clean pajamas and have clean sheets on your bed.           Follow-ups after your visit        Your next 10 appointments already scheduled     May 23, 2018   Procedure with Jarod Meyers MD   [facility], Same Day Surgery (--)    [address] [facility] [address]   [phone]            Sep 05, 2018   Procedure with Charbel Wise MD   [facility], Endoscopy ([facility])    [address]  [facility] [address]   [phone]           The [facility] is located on the corner of [facility] and [facility] on the [facility]. It is easily accessible from virtually  "any point in the [facility] area, via [address] and [address].              Who to contact     If you have questions or need follow up information about today's clinic visit or your schedule please contact [facility] directly at [phone].  Normal or non-critical lab and imaging results will be communicated to you by MyChart, letter or phone within 4 business days after the clinic has received the results. If you do not hear from us within 7 days, please contact the clinic through [employer] or phone. If you have a critical or abnormal lab result, we will notify you by phone as soon as possible.  Submit refill requests through [employer] or call your pharmacy and they will forward the refill request to us. Please allow 3 business days for your refill to be completed.          Additional Information About Your Visit        [employer] Information     [employer] gives you secure access to your electronic health record. If you see a primary care provider, you can also send messages to your care team and make appointments. If you have questions, please call your primary care clinic.  If you do not have a primary care provider, please call [phone] and they will assist you.        Care EveryWhere ID     This is your Care EveryWhere ID. This could be used by other organizations to access your [facility] medical records  [id number]        Your Vitals Were     Pulse Temperature Respirations Height Pulse Oximetry BMI (Body Mass Index)    79 98.2  F (36.8  C) (Oral) 18 5' 9.5\" (1.765 m) 96% 48.62 kg/m2       Blood Pressure from Last 3 Encounters:   05/14/18 123/74   03/28/18 113/68   03/22/18 134/90    Weight from Last 3 Encounters:   05/14/18 (!) 334 lb (151.5 kg)   03/22/18 (!) 334 lb (151.5 kg)   06/13/17 (!) 348 lb (157.9 kg)              We Performed the Following     CBC with platelets     Hemoglobin A1c          Today's Medication Changes          These changes are accurate as of 5/14/18 11:59 PM.  If you have any questions, " ask your nurse or doctor.               These medicines have changed or have updated prescriptions.        Dose/Directions    LANsoprazole 30 MG CR capsule   Commonly known as:  PREVACID   This may have changed:  Another medication with the same name was removed. Continue taking this medication, and follow the directions you see here.   Used for:  Breen's esophagus   Changed by:  José Kan MD        Dose:  30 mg   Take 1 capsule (30 mg) by mouth 2 times daily Open capsule put in apple sauce   Quantity:  30 capsule   Refills:  3               Information about OPIOIDS     PRESCRIPTION OPIOIDS: WHAT YOU NEED TO KNOW   You have a prescription for an opioid (narcotic) pain medicine. Opioids can cause addiction. If you have a history of chemical dependency of any type, you are at a higher risk of becoming addicted to opioids. Only take this medicine after all other options have been tried. Take it for as short a time and as few doses as possible.     Do not:    Drive. If you drive while taking these medicines, you could be arrested for driving under the influence (DUI).    Operate heavy machinery    Do any other dangerous activities while taking these medicines.     Drink any alcohol while taking these medicines.      Take with any other medicines that contain acetaminophen. Read all labels carefully. Look for the word  acetaminophen  or  Tylenol.  Ask your pharmacist if you have questions or are unsure.    Store your pills in a secure place, locked if possible. We will not replace any lost or stolen medicine. If you don t finish your medicine, please throw away (dispose) as directed by your pharmacist. The [place] Pollution Control Agency has more information about safe disposal: https://www.pca.[facility].[place].us/living-green/managing-unwanted-medications    All opioids tend to cause constipation. Drink plenty of water and eat foods that have a lot of fiber, such as fruits, vegetables, prune juice, apple juice and  high-fiber cereal. Take a laxative (Miralax, milk of magnesia, Colace, Senna) if you don t move your bowels at least every other day.          Primary Care Provider Office Phone # Fax #    José Kan [phone] [address]  [facility] [address]        Equal Access to Services     [facility]: Hadii aad ku hadasho Soomaali, waaxda luqadaha, qaybta kaalmada adeegyada, waxay idiin hayaan adeeg kharash la'aan . So [facility] [phone].    ATENCIÓN: Si habla español, tiene a de leon disposición servicios gratuitos de asistencia lingüística. Llame al [phone].    We comply with applicable federal civil rights laws and [place] laws. We do not discriminate on the basis of race, color, national origin, age, disability, sex, sexual orientation, or gender identity.            Thank you!     Thank you for choosing [facility]  for your care. Our goal is always to provide you with excellent care. Hearing back from our patients is one way we can continue to improve our services. Please take a few minutes to complete the written survey that you may receive in the mail after your visit with us. Thank you!             Your Updated Medication List - Protect others around you: Learn how to safely use, store and throw away your medicines at www.disposemymeds.org.          This list is accurate as of 5/14/18 11:59 PM.  Always use your most recent med list.                   Brand Name Dispense Instructions for use Diagnosis    acetaminophen 325 MG tablet    TYLENOL     Take 1,000 mg by mouth        CLARITIN PO           ibuprofen 600 MG tablet    ADVIL/MOTRIN     Take 600 mg by mouth        LANsoprazole 30 MG CR capsule    PREVACID    30 capsule    Take 1 capsule (30 mg) by mouth 2 times daily Open capsule put in apple sauce    Breen's esophagus       omeprazole 20 MG CR capsule    priLOSEC    60 capsule    Take 1 capsule (20 mg) by mouth 2 times daily    Dysphagia, unspecified type        oxyCODONE IR 5 MG tablet    ROXICODONE          SENEXON 8.6 MG tablet   Generic drug:  senna

## 2018-05-15 NOTE — PROGRESS NOTES
The following letter pertains to your most recent diagnostic tests:    Good news!     -Your complete blood counts including your hemoglobin returned normal for you.       -Your hemoglobin A1c test which is a diabetes blood test that represents and average of your blood sugars over the last 3 months returned at 5 which is normal.  No evidence of diabetes.      Bottom line:  Lab results look fine for surgery.        Sincerely,    Dr. Kan

## 2018-05-22 ENCOUNTER — ANESTHESIA EVENT (OUTPATIENT)
Dept: SURGERY | Facility: CLINIC | Age: 38
End: 2018-05-22
Payer: COMMERCIAL

## 2018-05-22 NOTE — ANESTHESIA PREPROCEDURE EVALUATION
Anesthesia Evaluation     . Pt has had prior anesthetic.     No history of anesthetic complications          ROS/MED HX    ENT/Pulmonary:     (+)sleep apnea, KHUSHBU risk factors obese, , . .    Neurologic:  - neg neurologic ROS     Cardiovascular:  - neg cardiovascular ROS       METS/Exercise Tolerance:     Hematologic:  - neg hematologic  ROS       Musculoskeletal:   (+) , , other musculoskeletal- multi ligament injury      GI/Hepatic:     (+) GERD Asymptomatic on medication,       Renal/Genitourinary:  - ROS Renal section negative       Endo:     (+) Obesity, .      Psychiatric:  - neg psychiatric ROS       Infectious Disease:  - neg infectious disease ROS       Malignancy:      - no malignancy   Other:    (+) H/O Chronic Pain,                   Physical Exam  Normal systems: dental    Airway   Mallampati: I  TM distance: >3 FB  Neck ROM: full    Dental     Cardiovascular   Rhythm and rate: regular and normal      Pulmonary    breath sounds clear to auscultation                    ANESTHESIA PREOP EVALUATION    Procedure: Procedure(s):  Left Knee Arthroscopy, Arthroscopic Anterior Cruciate Ligament Reconstruction with Hamstring Allograft, Possible Posterior Cruciate Ligament Reconstruction with Allograft, Posterior Lateral Corner Reconstruction with Allograft, Possible Medial Meniscal Repair (choice anes)  - Wound Class:    - Wound Class:    - Wound Class:    - Wound Class:     HPI: Lj Lamas is a 37 year old male who is presenting for above stated procedure.    PMHx/PSHx/ROS:  Past Medical History:   Diagnosis Date     Gastroesophageal reflux disease      Obese      Other chronic pain     low back pain - compressed vertebrae     Sleep apnea     mild - does not use CPAP     Venous (peripheral) insufficiency 2/9/2017       Past Surgical History:   Procedure Laterality Date     ESOPHAGOSCOPY, GASTROSCOPY, DUODENOSCOPY (EGD), COMBINED N/A 3/28/2018    Procedure: COMBINED ESOPHAGOSCOPY, GASTROSCOPY,  DUODENOSCOPY (EGD);  egd;  Surgeon: Charbel Wise MD;  Location: [facility]     NO HISTORY OF SURGERY         ROS as stated above    Soc Hx:   Social History   Substance Use Topics     Smoking status: Never Smoker     Smokeless tobacco: Never Used      Comment: occass cigars in college     Alcohol use 0.0 oz/week     0 Standard drinks or equivalent per week      Comment: 2-4 drinks per month       Allergies: No Known Allergies    Meds:   No prescriptions prior to admission.       Current Outpatient Prescriptions   Medication Sig Dispense Refill     oxyCODONE IR (ROXICODONE) 5 MG tablet   0     senna (SENEXON) 8.6 MG tablet        acetaminophen (TYLENOL) 325 MG tablet Take 1,000 mg by mouth        ibuprofen (ADVIL/MOTRIN) 600 MG tablet Take 600 mg by mouth       LANsoprazole (PREVACID) 30 MG CR capsule Take 1 capsule (30 mg) by mouth 2 times daily Open capsule put in apple sauce 30 capsule 3     Loratadine (CLARITIN PO)          Physical Exam:  VS:      , Weight   Wt Readings from Last 2 Encounters:   05/14/18 (!) 151.5 kg (334 lb)   03/22/18 (!) 151.5 kg (334 lb)       Labs:    BMP:  No results for input(s): NA, POTASSIUM, CHLORIDE, CO2, BUN, CR, GLC, KEITH in the last 40806 hours.  LFTs:   No results for input(s): PROTTOTAL, ALBUMIN, BILITOTAL, ALKPHOS, AST, ALT, BILIDIRECT in the last 81860 hours.  CBC:   Recent Labs   Lab Test  05/14/18   1707   WBC  8.0   RBC  5.30   HGB  15.9   HCT  48.6   MCV  92   MCH  30.0   MCHC  32.7   RDW  13.0   PLT  216     Coags:  No results for input(s): INR, PTT, FIBR in the last 87947 hours.        Patient discussed with Staff Anesthesiologist.    Jaxson Rolon  Anesthesia Resident CA-2  Pager [phone]  May 22, 2018, 5:37 PM    Anesthesia Plan      History & Physical Review  History and physical reviewed and following examination; no interval change.    ASA Status:  3 .        Plan for General, ETT and Peripheral Nerve Block with Intravenous induction. Maintenance will be Balanced.     PONV prophylaxis:  Ondansetron (or other 5HT-3) and Dexamethasone or Solumedrol  Additional equipment: Videolaryngoscope and 2nd IV   Lj Lamas is a 37 year old male with multi ligament injury requiring multi ligament repair with Dr. Meyers on 5/23/2018. PMH significant for morbid obesity, GERD, and chronic pain.       Postoperative Care  Postoperative pain management:  IV analgesics and Peripheral nerve block (Single Shot).      Consents  Anesthetic plan, risks, benefits and alternatives discussed with:  Patient.  Use of blood products discussed: Yes.   Consented to blood products.  .        I have personally discussed the risks and benefits of anesthesia with the patient and patient agrees to proceed.    Jakob Thornton MD  Anesthesiology                    .

## 2018-05-23 ENCOUNTER — ANESTHESIA (OUTPATIENT)
Dept: SURGERY | Facility: CLINIC | Age: 38
End: 2018-05-23
Payer: COMMERCIAL

## 2018-05-23 ENCOUNTER — SURGERY (OUTPATIENT)
Age: 38
End: 2018-05-23

## 2018-05-23 ENCOUNTER — HOSPITAL ENCOUNTER (OUTPATIENT)
Facility: CLINIC | Age: 38
Setting detail: OBSERVATION
Discharge: HOME | End: 2018-05-24
Attending: ORTHOPAEDIC SURGERY | Admitting: ORTHOPAEDIC SURGERY
Payer: COMMERCIAL

## 2018-05-23 DIAGNOSIS — M25.562 LEFT KNEE PAIN, UNSPECIFIED CHRONICITY: Primary | ICD-10-CM

## 2018-05-23 PROBLEM — Z98.890 STATUS POST ANTERIOR CRUCIATE LIGAMENT SURGERY: Status: ACTIVE | Noted: 2018-05-23

## 2018-05-23 LAB
ABO + RH BLD: NORMAL
ABO + RH BLD: NORMAL
BLD GP AB SCN SERPL QL: NORMAL
BLOOD BANK CMNT PATIENT-IMP: NORMAL
GLUCOSE SERPL-MCNC: 92 MG/DL (ref 70–99)
SPECIMEN EXP DATE BLD: NORMAL

## 2018-05-23 PROCEDURE — 25000125 ZZHC RX 250: Performed by: ANESTHESIOLOGY

## 2018-05-23 PROCEDURE — 25000132 ZZH RX MED GY IP 250 OP 250 PS 637: Performed by: STUDENT IN AN ORGANIZED HEALTH CARE EDUCATION/TRAINING PROGRAM

## 2018-05-23 PROCEDURE — 40000171 ZZH STATISTIC PRE-PROCEDURE ASSESSMENT III: Performed by: ORTHOPAEDIC SURGERY

## 2018-05-23 PROCEDURE — 25000128 H RX IP 250 OP 636: Performed by: ANESTHESIOLOGY

## 2018-05-23 PROCEDURE — 86850 RBC ANTIBODY SCREEN: CPT | Performed by: ORTHOPAEDIC SURGERY

## 2018-05-23 PROCEDURE — 25000132 ZZH RX MED GY IP 250 OP 250 PS 637: Performed by: ANESTHESIOLOGY

## 2018-05-23 PROCEDURE — 86900 BLOOD TYPING SEROLOGIC ABO: CPT | Performed by: ORTHOPAEDIC SURGERY

## 2018-05-23 PROCEDURE — 25000125 ZZHC RX 250: Performed by: NURSE ANESTHETIST, CERTIFIED REGISTERED

## 2018-05-23 PROCEDURE — C9399 UNCLASSIFIED DRUGS OR BIOLOG: HCPCS | Performed by: NURSE ANESTHETIST, CERTIFIED REGISTERED

## 2018-05-23 PROCEDURE — 25000128 H RX IP 250 OP 636: Performed by: NURSE ANESTHETIST, CERTIFIED REGISTERED

## 2018-05-23 PROCEDURE — C1762 CONN TISS, HUMAN(INC FASCIA): HCPCS | Performed by: ORTHOPAEDIC SURGERY

## 2018-05-23 PROCEDURE — 25000566 ZZH SEVOFLURANE, EA 15 MIN: Performed by: ORTHOPAEDIC SURGERY

## 2018-05-23 PROCEDURE — 71000015 ZZH RECOVERY PHASE 1 LEVEL 2 EA ADDTL HR: Performed by: ORTHOPAEDIC SURGERY

## 2018-05-23 PROCEDURE — 25000128 H RX IP 250 OP 636: Performed by: ORTHOPAEDIC SURGERY

## 2018-05-23 PROCEDURE — 37000009 ZZH ANESTHESIA TECHNICAL FEE, EACH ADDTL 15 MIN: Performed by: ORTHOPAEDIC SURGERY

## 2018-05-23 PROCEDURE — 36000062 ZZH SURGERY LEVEL 4 1ST 30 MIN - UMMC: Performed by: ORTHOPAEDIC SURGERY

## 2018-05-23 PROCEDURE — 27210794 ZZH OR GENERAL SUPPLY STERILE: Performed by: ORTHOPAEDIC SURGERY

## 2018-05-23 PROCEDURE — 86901 BLOOD TYPING SEROLOGIC RH(D): CPT | Performed by: ORTHOPAEDIC SURGERY

## 2018-05-23 PROCEDURE — G0378 HOSPITAL OBSERVATION PER HR: HCPCS

## 2018-05-23 PROCEDURE — 36415 COLL VENOUS BLD VENIPUNCTURE: CPT | Performed by: ORTHOPAEDIC SURGERY

## 2018-05-23 PROCEDURE — 36000064 ZZH SURGERY LEVEL 4 EA 15 ADDTL MIN - UMMC: Performed by: ORTHOPAEDIC SURGERY

## 2018-05-23 PROCEDURE — 71000014 ZZH RECOVERY PHASE 1 LEVEL 2 FIRST HR: Performed by: ORTHOPAEDIC SURGERY

## 2018-05-23 PROCEDURE — C9290 INJ, BUPIVACAINE LIPOSOME: HCPCS | Performed by: ANESTHESIOLOGY

## 2018-05-23 PROCEDURE — 37000008 ZZH ANESTHESIA TECHNICAL FEE, 1ST 30 MIN: Performed by: ORTHOPAEDIC SURGERY

## 2018-05-23 PROCEDURE — C1713 ANCHOR/SCREW BN/BN,TIS/BN: HCPCS | Performed by: ORTHOPAEDIC SURGERY

## 2018-05-23 PROCEDURE — 27210995 ZZH RX 272: Performed by: ORTHOPAEDIC SURGERY

## 2018-05-23 PROCEDURE — 82947 ASSAY GLUCOSE BLOOD QUANT: CPT | Performed by: ORTHOPAEDIC SURGERY

## 2018-05-23 DEVICE — IMP SCR ARTHREX BIOCOMPOSITE INTERFERENCE 6X23MM AR-1360C: Type: IMPLANTABLE DEVICE | Site: KNEE | Status: FUNCTIONAL

## 2018-05-23 DEVICE — IMP BUTTON ARTHREX ABS TIGHT ROPE 11MM BUTTON AR-1588TB-3: Type: IMPLANTABLE DEVICE | Site: KNEE | Status: FUNCTIONAL

## 2018-05-23 DEVICE — IMPLANTABLE DEVICE: Type: IMPLANTABLE DEVICE | Site: KNEE | Status: FUNCTIONAL

## 2018-05-23 DEVICE — IMP ANCHOR ARTHREX ACL TIGHT ROPE REPAIR AR-1588RT: Type: IMPLANTABLE DEVICE | Site: KNEE | Status: FUNCTIONAL

## 2018-05-23 DEVICE — IMP ANCHOR ARTHREX ABS TIGHT ROPE IMP & BUTTON AR-1588TN: Type: IMPLANTABLE DEVICE | Site: KNEE | Status: FUNCTIONAL

## 2018-05-23 RX ORDER — PROPOFOL 10 MG/ML
INJECTION, EMULSION INTRAVENOUS PRN
Status: DISCONTINUED | OUTPATIENT
Start: 2018-05-23 | End: 2018-05-23

## 2018-05-23 RX ORDER — ACETAMINOPHEN 325 MG/1
975 TABLET ORAL ONCE
Status: COMPLETED | OUTPATIENT
Start: 2018-05-23 | End: 2018-05-23

## 2018-05-23 RX ORDER — BUPIVACAINE HYDROCHLORIDE AND EPINEPHRINE 2.5; 5 MG/ML; UG/ML
INJECTION, SOLUTION INFILTRATION; PERINEURAL PRN
Status: DISCONTINUED | OUTPATIENT
Start: 2018-05-23 | End: 2018-05-23

## 2018-05-23 RX ORDER — DEXAMETHASONE SODIUM PHOSPHATE 4 MG/ML
INJECTION, SOLUTION INTRA-ARTICULAR; INTRALESIONAL; INTRAMUSCULAR; INTRAVENOUS; SOFT TISSUE PRN
Status: DISCONTINUED | OUTPATIENT
Start: 2018-05-23 | End: 2018-05-23

## 2018-05-23 RX ORDER — MAGNESIUM HYDROXIDE 1200 MG/15ML
LIQUID ORAL PRN
Status: DISCONTINUED | OUTPATIENT
Start: 2018-05-23 | End: 2018-05-23 | Stop reason: HOSPADM

## 2018-05-23 RX ORDER — FLUMAZENIL 0.1 MG/ML
0.2 INJECTION, SOLUTION INTRAVENOUS
Status: DISCONTINUED | OUTPATIENT
Start: 2018-05-23 | End: 2018-05-23 | Stop reason: HOSPADM

## 2018-05-23 RX ORDER — NALOXONE HYDROCHLORIDE 0.4 MG/ML
.1-.4 INJECTION, SOLUTION INTRAMUSCULAR; INTRAVENOUS; SUBCUTANEOUS
Status: DISCONTINUED | OUTPATIENT
Start: 2018-05-23 | End: 2018-05-23

## 2018-05-23 RX ORDER — NALOXONE HYDROCHLORIDE 0.4 MG/ML
.1-.4 INJECTION, SOLUTION INTRAMUSCULAR; INTRAVENOUS; SUBCUTANEOUS
Status: DISCONTINUED | OUTPATIENT
Start: 2018-05-23 | End: 2018-05-23 | Stop reason: HOSPADM

## 2018-05-23 RX ORDER — MORPHINE SULFATE 15 MG/1
15 TABLET, FILM COATED, EXTENDED RELEASE ORAL EVERY 12 HOURS
Qty: 6 TABLET | Refills: 0 | Status: SHIPPED | OUTPATIENT
Start: 2018-05-23 | End: 2018-07-05

## 2018-05-23 RX ORDER — LABETALOL HYDROCHLORIDE 5 MG/ML
10 INJECTION, SOLUTION INTRAVENOUS
Status: DISCONTINUED | OUTPATIENT
Start: 2018-05-23 | End: 2018-05-23 | Stop reason: HOSPADM

## 2018-05-23 RX ORDER — FENTANYL CITRATE 50 UG/ML
INJECTION, SOLUTION INTRAMUSCULAR; INTRAVENOUS PRN
Status: DISCONTINUED | OUTPATIENT
Start: 2018-05-23 | End: 2018-05-23

## 2018-05-23 RX ORDER — SODIUM CHLORIDE, SODIUM LACTATE, POTASSIUM CHLORIDE, CALCIUM CHLORIDE 600; 310; 30; 20 MG/100ML; MG/100ML; MG/100ML; MG/100ML
INJECTION, SOLUTION INTRAVENOUS CONTINUOUS PRN
Status: DISCONTINUED | OUTPATIENT
Start: 2018-05-23 | End: 2018-05-23

## 2018-05-23 RX ORDER — FENTANYL CITRATE 50 UG/ML
25-50 INJECTION, SOLUTION INTRAMUSCULAR; INTRAVENOUS
Status: DISCONTINUED | OUTPATIENT
Start: 2018-05-23 | End: 2018-05-23 | Stop reason: HOSPADM

## 2018-05-23 RX ORDER — HYDRALAZINE HYDROCHLORIDE 20 MG/ML
2.5-5 INJECTION INTRAMUSCULAR; INTRAVENOUS EVERY 10 MIN PRN
Status: DISCONTINUED | OUTPATIENT
Start: 2018-05-23 | End: 2018-05-23 | Stop reason: HOSPADM

## 2018-05-23 RX ORDER — OXYCODONE HYDROCHLORIDE 5 MG/1
5-10 TABLET ORAL EVERY 4 HOURS PRN
Status: DISCONTINUED | OUTPATIENT
Start: 2018-05-23 | End: 2018-05-24 | Stop reason: HOSPADM

## 2018-05-23 RX ORDER — PROPOFOL 10 MG/ML
INJECTION, EMULSION INTRAVENOUS CONTINUOUS PRN
Status: DISCONTINUED | OUTPATIENT
Start: 2018-05-23 | End: 2018-05-23

## 2018-05-23 RX ORDER — CEFAZOLIN SODIUM 1 G/3ML
1 INJECTION, POWDER, FOR SOLUTION INTRAMUSCULAR; INTRAVENOUS SEE ADMIN INSTRUCTIONS
Status: DISCONTINUED | OUTPATIENT
Start: 2018-05-23 | End: 2018-05-23 | Stop reason: HOSPADM

## 2018-05-23 RX ORDER — CEFAZOLIN SODIUM 1 G/50ML
3 SOLUTION INTRAVENOUS
Status: COMPLETED | OUTPATIENT
Start: 2018-05-23 | End: 2018-05-23

## 2018-05-23 RX ORDER — ONDANSETRON 2 MG/ML
4 INJECTION INTRAMUSCULAR; INTRAVENOUS EVERY 30 MIN PRN
Status: DISCONTINUED | OUTPATIENT
Start: 2018-05-23 | End: 2018-05-23 | Stop reason: HOSPADM

## 2018-05-23 RX ORDER — OXYCODONE HYDROCHLORIDE 5 MG/1
5-10 TABLET ORAL
Qty: 60 TABLET | Refills: 0 | Status: SHIPPED | OUTPATIENT
Start: 2018-05-23 | End: 2018-07-05

## 2018-05-23 RX ORDER — LIDOCAINE HYDROCHLORIDE 20 MG/ML
INJECTION, SOLUTION INFILTRATION; PERINEURAL PRN
Status: DISCONTINUED | OUTPATIENT
Start: 2018-05-23 | End: 2018-05-23

## 2018-05-23 RX ORDER — ONDANSETRON 4 MG/1
4-8 TABLET, ORALLY DISINTEGRATING ORAL EVERY 8 HOURS PRN
Qty: 10 TABLET | Refills: 0 | Status: SHIPPED | OUTPATIENT
Start: 2018-05-23 | End: 2018-07-05

## 2018-05-23 RX ORDER — HYDROXYZINE HYDROCHLORIDE 25 MG/1
25 TABLET, FILM COATED ORAL EVERY 6 HOURS PRN
Qty: 40 TABLET | Refills: 0 | Status: SHIPPED | OUTPATIENT
Start: 2018-05-23 | End: 2018-07-05

## 2018-05-23 RX ORDER — HYDROMORPHONE HYDROCHLORIDE 1 MG/ML
0.2 INJECTION, SOLUTION INTRAMUSCULAR; INTRAVENOUS; SUBCUTANEOUS
Status: ACTIVE | OUTPATIENT
Start: 2018-05-23 | End: 2018-05-24

## 2018-05-23 RX ORDER — AMOXICILLIN 250 MG
1-2 CAPSULE ORAL 2 TIMES DAILY
Qty: 30 TABLET | Refills: 0 | Status: SHIPPED | OUTPATIENT
Start: 2018-05-23 | End: 2018-07-05

## 2018-05-23 RX ORDER — ONDANSETRON 4 MG/1
4 TABLET, ORALLY DISINTEGRATING ORAL EVERY 6 HOURS PRN
Status: DISCONTINUED | OUTPATIENT
Start: 2018-05-23 | End: 2018-05-24 | Stop reason: HOSPADM

## 2018-05-23 RX ORDER — KETOROLAC TROMETHAMINE 15 MG/ML
15 INJECTION, SOLUTION INTRAMUSCULAR; INTRAVENOUS EVERY 6 HOURS
Status: DISCONTINUED | OUTPATIENT
Start: 2018-05-23 | End: 2018-05-24

## 2018-05-23 RX ORDER — METOCLOPRAMIDE HYDROCHLORIDE 5 MG/ML
10 INJECTION INTRAMUSCULAR; INTRAVENOUS EVERY 6 HOURS
Status: DISCONTINUED | OUTPATIENT
Start: 2018-05-23 | End: 2018-05-24 | Stop reason: HOSPADM

## 2018-05-23 RX ORDER — ONDANSETRON 4 MG/1
4 TABLET, ORALLY DISINTEGRATING ORAL EVERY 30 MIN PRN
Status: DISCONTINUED | OUTPATIENT
Start: 2018-05-23 | End: 2018-05-23 | Stop reason: HOSPADM

## 2018-05-23 RX ORDER — KETOROLAC TROMETHAMINE 30 MG/ML
30 INJECTION, SOLUTION INTRAMUSCULAR; INTRAVENOUS ONCE
Status: COMPLETED | OUTPATIENT
Start: 2018-05-23 | End: 2018-05-23

## 2018-05-23 RX ORDER — LIDOCAINE 40 MG/G
CREAM TOPICAL
Status: DISCONTINUED | OUTPATIENT
Start: 2018-05-23 | End: 2018-05-24 | Stop reason: HOSPADM

## 2018-05-23 RX ORDER — ACETAMINOPHEN 325 MG/1
650 TABLET ORAL EVERY 6 HOURS PRN
Status: DISCONTINUED | OUTPATIENT
Start: 2018-05-23 | End: 2018-05-24 | Stop reason: HOSPADM

## 2018-05-23 RX ORDER — SODIUM CHLORIDE, SODIUM LACTATE, POTASSIUM CHLORIDE, CALCIUM CHLORIDE 600; 310; 30; 20 MG/100ML; MG/100ML; MG/100ML; MG/100ML
INJECTION, SOLUTION INTRAVENOUS CONTINUOUS
Status: DISCONTINUED | OUTPATIENT
Start: 2018-05-23 | End: 2018-05-23 | Stop reason: HOSPADM

## 2018-05-23 RX ORDER — ONDANSETRON 2 MG/ML
INJECTION INTRAMUSCULAR; INTRAVENOUS PRN
Status: DISCONTINUED | OUTPATIENT
Start: 2018-05-23 | End: 2018-05-23

## 2018-05-23 RX ORDER — NALOXONE HYDROCHLORIDE 0.4 MG/ML
.1-.4 INJECTION, SOLUTION INTRAMUSCULAR; INTRAVENOUS; SUBCUTANEOUS
Status: DISCONTINUED | OUTPATIENT
Start: 2018-05-23 | End: 2018-05-24 | Stop reason: HOSPADM

## 2018-05-23 RX ORDER — GABAPENTIN 300 MG/1
300 CAPSULE ORAL ONCE
Status: COMPLETED | OUTPATIENT
Start: 2018-05-23 | End: 2018-05-23

## 2018-05-23 RX ORDER — OXYCODONE HYDROCHLORIDE 5 MG/1
10 TABLET ORAL ONCE
Status: COMPLETED | OUTPATIENT
Start: 2018-05-23 | End: 2018-05-23

## 2018-05-23 RX ORDER — ONDANSETRON 2 MG/ML
4 INJECTION INTRAMUSCULAR; INTRAVENOUS EVERY 6 HOURS PRN
Status: DISCONTINUED | OUTPATIENT
Start: 2018-05-23 | End: 2018-05-24 | Stop reason: HOSPADM

## 2018-05-23 RX ADMIN — SODIUM CHLORIDE, POTASSIUM CHLORIDE, SODIUM LACTATE AND CALCIUM CHLORIDE: 600; 310; 30; 20 INJECTION, SOLUTION INTRAVENOUS at 13:26

## 2018-05-23 RX ADMIN — DEXAMETHASONE SODIUM PHOSPHATE 4 MG: 4 INJECTION, SOLUTION INTRAMUSCULAR; INTRAVENOUS at 13:56

## 2018-05-23 RX ADMIN — FENTANYL CITRATE 25 MCG: 50 INJECTION, SOLUTION INTRAMUSCULAR; INTRAVENOUS at 15:10

## 2018-05-23 RX ADMIN — CEFAZOLIN 1 G: 1 INJECTION, POWDER, FOR SOLUTION INTRAMUSCULAR; INTRAVENOUS at 15:50

## 2018-05-23 RX ADMIN — PROPOFOL: 10 INJECTION, EMULSION INTRAVENOUS at 16:40

## 2018-05-23 RX ADMIN — FENTANYL CITRATE 25 MCG: 50 INJECTION, SOLUTION INTRAMUSCULAR; INTRAVENOUS at 18:00

## 2018-05-23 RX ADMIN — PROCHLORPERAZINE EDISYLATE 10 MG: 5 INJECTION INTRAMUSCULAR; INTRAVENOUS at 18:48

## 2018-05-23 RX ADMIN — ROCURONIUM BROMIDE 50 MG: 10 INJECTION INTRAVENOUS at 13:46

## 2018-05-23 RX ADMIN — ACETAMINOPHEN 975 MG: 325 TABLET ORAL at 11:37

## 2018-05-23 RX ADMIN — MIDAZOLAM HYDROCHLORIDE 1 MG: 1 INJECTION, SOLUTION INTRAMUSCULAR; INTRAVENOUS at 12:57

## 2018-05-23 RX ADMIN — FENTANYL CITRATE 50 MCG: 50 INJECTION, SOLUTION INTRAMUSCULAR; INTRAVENOUS at 13:03

## 2018-05-23 RX ADMIN — FENTANYL CITRATE 50 MCG: 50 INJECTION, SOLUTION INTRAMUSCULAR; INTRAVENOUS at 13:44

## 2018-05-23 RX ADMIN — EPINEPHRINE 13000 ML: 1 INJECTION, SOLUTION INTRAMUSCULAR; SUBCUTANEOUS at 16:55

## 2018-05-23 RX ADMIN — SUGAMMADEX 280 MG: 100 INJECTION, SOLUTION INTRAVENOUS at 17:19

## 2018-05-23 RX ADMIN — GABAPENTIN 300 MG: 300 CAPSULE ORAL at 11:37

## 2018-05-23 RX ADMIN — ONDANSETRON 4 MG: 2 INJECTION INTRAMUSCULAR; INTRAVENOUS at 18:25

## 2018-05-23 RX ADMIN — FENTANYL CITRATE 50 MCG: 50 INJECTION, SOLUTION INTRAMUSCULAR; INTRAVENOUS at 12:58

## 2018-05-23 RX ADMIN — ACETAMINOPHEN 975 MG: 325 TABLET ORAL at 19:36

## 2018-05-23 RX ADMIN — Medication 3 G: at 13:50

## 2018-05-23 RX ADMIN — FENTANYL CITRATE 50 MCG: 50 INJECTION, SOLUTION INTRAMUSCULAR; INTRAVENOUS at 13:33

## 2018-05-23 RX ADMIN — FENTANYL CITRATE 50 MCG: 50 INJECTION, SOLUTION INTRAMUSCULAR; INTRAVENOUS at 14:35

## 2018-05-23 RX ADMIN — HYDROMORPHONE HYDROCHLORIDE 0.2 MG: 1 INJECTION, SOLUTION INTRAMUSCULAR; INTRAVENOUS; SUBCUTANEOUS at 19:42

## 2018-05-23 RX ADMIN — ONDANSETRON 4 MG: 2 INJECTION INTRAMUSCULAR; INTRAVENOUS at 16:53

## 2018-05-23 RX ADMIN — OXYCODONE HYDROCHLORIDE 5 MG: 5 TABLET ORAL at 19:38

## 2018-05-23 RX ADMIN — KETOROLAC TROMETHAMINE 30 MG: 30 INJECTION, SOLUTION INTRAMUSCULAR at 19:03

## 2018-05-23 RX ADMIN — BUPIVACAINE 10 ML: 13.3 INJECTION, SUSPENSION, LIPOSOMAL INFILTRATION at 13:00

## 2018-05-23 RX ADMIN — LIDOCAINE HYDROCHLORIDE 80 MG: 20 INJECTION, SOLUTION INFILTRATION; PERINEURAL at 13:36

## 2018-05-23 RX ADMIN — BUPIVACAINE HYDROCHLORIDE AND EPINEPHRINE BITARTRATE 20 ML: 2.5; .005 INJECTION, SOLUTION INFILTRATION; PERINEURAL at 13:00

## 2018-05-23 RX ADMIN — FENTANYL CITRATE 25 MCG: 50 INJECTION, SOLUTION INTRAMUSCULAR; INTRAVENOUS at 15:12

## 2018-05-23 RX ADMIN — PROPOFOL 35 MCG/KG/MIN: 10 INJECTION, EMULSION INTRAVENOUS at 15:34

## 2018-05-23 RX ADMIN — MIDAZOLAM 2 MG: 1 INJECTION INTRAMUSCULAR; INTRAVENOUS at 13:26

## 2018-05-23 RX ADMIN — KETOROLAC TROMETHAMINE 15 MG: 15 INJECTION, SOLUTION INTRAMUSCULAR; INTRAVENOUS at 22:48

## 2018-05-23 RX ADMIN — Medication 100 MG: at 13:36

## 2018-05-23 RX ADMIN — FENTANYL CITRATE 25 MCG: 50 INJECTION, SOLUTION INTRAMUSCULAR; INTRAVENOUS at 17:53

## 2018-05-23 RX ADMIN — PROPOFOL 200 MG: 10 INJECTION, EMULSION INTRAVENOUS at 13:36

## 2018-05-23 RX ADMIN — FENTANYL CITRATE 50 MCG: 50 INJECTION, SOLUTION INTRAMUSCULAR; INTRAVENOUS at 18:11

## 2018-05-23 RX ADMIN — HYDROMORPHONE HYDROCHLORIDE 0.3 MG: 1 INJECTION, SOLUTION INTRAMUSCULAR; INTRAVENOUS; SUBCUTANEOUS at 20:31

## 2018-05-23 RX ADMIN — SODIUM CHLORIDE 200 ML: 900 IRRIGANT IRRIGATION at 14:30

## 2018-05-23 RX ADMIN — SODIUM CHLORIDE, POTASSIUM CHLORIDE, SODIUM LACTATE AND CALCIUM CHLORIDE: 600; 310; 30; 20 INJECTION, SOLUTION INTRAVENOUS at 13:52

## 2018-05-23 RX ADMIN — HYDROMORPHONE HYDROCHLORIDE 0.3 MG: 1 INJECTION, SOLUTION INTRAMUSCULAR; INTRAVENOUS; SUBCUTANEOUS at 18:51

## 2018-05-23 NOTE — IP AVS SNAPSHOT
[facility]    [address]    [facility] [address]    Phone:  [phone]                                       After Visit Summary   5/23/2018    Lj Lamas    MRN: [id number]           After Visit Summary Signature Page     I have received my discharge instructions, and my questions have been answered. I have discussed any challenges I see with this plan with the nurse or doctor.    ..........................................................................................................................................  Patient/Patient Representative Signature      ..........................................................................................................................................  Patient Representative Print Name and Relationship to Patient    ..................................................               ................................................  Date                                            Time    ..........................................................................................................................................  Reviewed by Signature/Title    ...................................................              ..............................................  Date                                                            Time

## 2018-05-23 NOTE — ANESTHESIA POSTPROCEDURE EVALUATION
Patient: Lj Lamas    Procedure(s):  Left Knee  Arthroscopic Anterior Cruciate Ligament Reconstruction with Hamstring Allograft, Fibular Colateral Ligament Reconstruction with Allograft, and Peroneal Nerve Neurolysis - Wound Class: I-Clean   - Wound Class: I-Clean    Diagnosis:Left Knee Multi Ligament Injury   Diagnosis Additional Information: No value filed.    Anesthesia Type:  General, ETT, Peripheral Nerve Block    Note:  Anesthesia Post Evaluation    Patient location during evaluation: PACU  Patient participation: Able to fully participate in evaluation  Level of consciousness: awake  Pain management: adequate  Airway patency: patent  Cardiovascular status: acceptable and stable  Respiratory status: acceptable and room air  Hydration status: acceptable  PONV: none     Anesthetic complications: None          Last vitals:  Vitals:    05/23/18 1830 05/23/18 1845 05/23/18 1900   BP: 136/75 131/73 134/67   Pulse:      Resp: 15 9 17   Temp:      SpO2: 97% 97% 96%         Electronically Signed By: Johan Isaac MD  May 23, 2018  7:45 PM

## 2018-05-23 NOTE — IP AVS SNAPSHOT
MRN:[id number]                      After Visit Summary   5/23/2018    Lj Lamas    MRN: [id number]           Thank you!     Thank you for choosing [facility] for your care. Our goal is always to provide you with excellent care. Hearing back from our patients is one way we can continue to improve our services. Please take a few minutes to complete the written survey that you may receive in the mail after you visit with us. Thank you!        Patient Information     Date Of Birth          1980        About your hospital stay     You were admitted on:  May 23, 2018 You last received care in the:  [facility] 8[facility]    You were discharged on:  May 24, 2018       Who to Call     For medical emergencies, please call 911.  For non-urgent questions about your medical care, please call your primary care provider or clinic, [phone]  For questions related to your surgery, please call your surgery clinic        Attending Provider     Provider Specialty    Jarod Meyers MD Orthopedics       Primary Care Provider Office Phone # Fax #    José Kan [phone]      After Care Instructions      Diet as Tolerated       Return to diet before surgery, unless instructed otherwise.            Discharge Instructions       Review outpatient procedure discharge instructions with patient as directed by Provider            Discharge Instructions       Follow up appointment as instructed by Provider and/or Nurse.            Discharge Instructions - diet       Resume pre procedure diet.            Do not immerse incision in water        until the sutures are removed.            Dressing       Keep dressing clean and dry.  Dressing / incision care as instructed by Provider and/or Nurse.            Dressing Change       Change dressing on third day after surgery.            Ice to affected area       Ice pack to surgical site 3-4 hours for 15 minutes for swelling and pain            Ice to  affected area       Ice to operative site, as needed.            NO driving or operating machinery        until the day after the procedure.            No Alcohol       For 24 hours post procedure            No driving or operating machinery        until the day after procedure            Return to clinic       Return to clinic within 2 weeks.  Call to confirm date and time.            Shower        Cover dressing if dressing is not going to be changed today            Weight bearing status - Toe touch       With crutches and HKB locked in extension            Wound care       Do not immerse wound in water until sutures removed                  Your next 10 appointments already scheduled     Sep 05, 2018   Procedure with Charbel Wise MD   [facility], [place], Endoscopy ([facility], [facility])    [address] [place] [address]   [phone]           The [facility] is located on the corner of [address] and [address] on the [facility]. It is easily accessible from virtually any point in the [facility] area, via [employer] and [employer].              Further instructions from your care team       Same-Day Surgery   Adult Discharge Orders & Instructions     For 24 hours after surgery:  1. Get plenty of rest.  A responsible adult must stay with you for at least 24 hours after you leave the hospital.   2. Pain medication can slow your reflexes. Do not drive or use heavy equipment.  If you have weakness or tingling, don't drive or use heavy equipment until this feeling goes away.  3. Mixing alcohol and pain medication can cause dizziness and slow your breathing. It can even be fatal. Do not drink alcohol while taking pain medication.  4. Avoid strenuous or risky activities.  Ask for help when climbing stairs.   5. You may feel lightheaded.  If so, sit for a few minutes before standing.  Have someone help you get up.   6. If you have nausea  (feel sick to your stomach), drink only clear liquids such as apple juice, ginger ale, broth or 7-Up.  Rest may also help.  Be sure to drink enough fluids.  Move to a regular diet as you feel able. Take pain medications with a small amount of solid food, such as toast or crackers, to avoid nausea.   7. A slight fever is normal. Call the doctor if your fever is over 100 F (37.7 C) (taken under the tongue) or lasts longer than 24 hours.  8. You may have a dry mouth, muscle aches, trouble sleeping or a sore throat.  These symptoms should go away after 24 hours.  9. Do not make important or legal decisions.   Pain Management:      1. Take pain medication (if prescribed) for pain as directed by your physician.        2. WARNING: If the pain medication you have been prescribed contains Tylenol  (acetaminophen), DO NOT take additional doses of Tylenol (acetaminophen).     Call your doctor for any of the followin.  Signs of infection (fever, growing tenderness at the surgery site, severe pain, a large amount of drainage or bleeding, foul-smelling drainage, redness, swelling).    2.  It has been over 8 to 10 hours since surgery and you are still not able to urinate (pee).    3.  Headache for over 24 hours.    4.  Numbness, tingling or weakness the day after surgery (if you had spinal anesthesia).  To contact a doctor, call _____________________________________ or:      [phone] and ask for the Resident On Call for:          __________________________________________ (answered 24 hours a day)      Emergency Department:  [facility]: [phone]  [facility]: [phone]               Rev. 10/2014       Pending Results     No orders found for last 3 day(s).            Admission Information     Date & Time Provider Department Dept. Phone    2018 Jarod Meyers MD [facility] 8A [phone]      Your Vitals Were     Blood Pressure Pulse Temperature Respirations Height Weight  "   104/57 (BP Location: Left arm) 79 98.2  F (36.8  C) (Oral) 16 1.778 m (5' 10\") 141.6 kg (312 lb 2.7 oz)    Pulse Oximetry BMI (Body Mass Index)                96% 44.79 kg/m2          [employer] Information     [employer] gives you secure access to your electronic health record. If you see a primary care provider, you can also send messages to your care team and make appointments. If you have questions, please call your primary care clinic.  If you do not have a primary care provider, please call [phone] and they will assist you.        Care EveryWhere ID     This is your Care EveryWhere ID. This could be used by other organizations to access your [facility] medical records  [id number]        Equal Access to Services     MARIO SHEEHAN : Guillermina Narvaez, dayron troncoso, boom gaviria, larry nam. So [facility] [phone].    ATENCIÓN: Si habla español, tiene a de leon disposición servicios gratuitos de asistencia lingüística. Llame al [phone].    We comply with applicable federal civil rights laws and [place] laws. We do not discriminate on the basis of race, color, national origin, age, disability, sex, sexual orientation, or gender identity.               Review of your medicines      START taking        Dose / Directions    aspirin 81 MG EC tablet   Used for:  Left knee pain, unspecified chronicity        Dose:  162 mg   Take 2 tablets (162 mg) by mouth daily   Quantity:  60 tablet   Refills:  0       hydrOXYzine 25 MG tablet   Commonly known as:  ATARAX   Used for:  Left knee pain, unspecified chronicity        Dose:  25 mg   Take 1 tablet (25 mg) by mouth every 6 hours as needed for itching (and nausea)   Quantity:  40 tablet   Refills:  0       morphine 15 MG 12 hr tablet   Commonly known as:  MS CONTIN   Used for:  Left knee pain, unspecified chronicity        Dose:  15 mg   Take 1 tablet (15 mg) by mouth every 12 hours maximum 2 tablet(s) per day "   Quantity:  6 tablet   Refills:  0       ondansetron 4 MG ODT tab   Commonly known as:  ZOFRAN-ODT   Used for:  Left knee pain, unspecified chronicity        Dose:  4-8 mg   Take 1-2 tablets (4-8 mg) by mouth every 8 hours as needed for nausea Dissolve ON the tongue.   Quantity:  10 tablet   Refills:  0       senna-docusate 8.6-50 MG per tablet   Commonly known as:  SENOKOT-S;PERICOLACE   Used for:  Left knee pain, unspecified chronicity        Dose:  1-2 tablet   Take 1-2 tablets by mouth 2 times daily Take while on oral narcotics to prevent or treat constipation.   Quantity:  30 tablet   Refills:  0         CONTINUE these medicines which may have CHANGED, or have new prescriptions. If we are uncertain of the size of tablets/capsules you have at home, strength may be listed as something that might have changed.        Dose / Directions    oxyCODONE IR 5 MG tablet   Commonly known as:  ROXICODONE   This may have changed:    - how much to take  - how to take this  - when to take this  - reasons to take this   Used for:  Left knee pain, unspecified chronicity        Dose:  5-10 mg   Take 1-2 tablets (5-10 mg) by mouth every 3 hours as needed for pain or other (Moderate to Severe)   Quantity:  60 tablet   Refills:  0         CONTINUE these medicines which have NOT CHANGED        Dose / Directions    acetaminophen 325 MG tablet   Commonly known as:  TYLENOL        Dose:  1000 mg   Take 1,000 mg by mouth   Refills:  0       CLARITIN PO        Refills:  0       ibuprofen 600 MG tablet   Commonly known as:  ADVIL/MOTRIN        Dose:  600 mg   Take 600 mg by mouth   Refills:  0       LANsoprazole 30 MG CR capsule   Commonly known as:  PREVACID   Used for:  Breen's esophagus        Dose:  30 mg   Take 1 capsule (30 mg) by mouth 2 times daily Open capsule put in apple sauce   Quantity:  30 capsule   Refills:  3         STOP taking     SENEXON 8.6 MG tablet   Generic drug:  senna                Where to get your medicines  "     Some of these will need a paper prescription and others can be bought over the counter. Ask your nurse if you have questions.     Bring a paper prescription for each of these medications     aspirin 81 MG EC tablet    hydrOXYzine 25 MG tablet    morphine 15 MG 12 hr tablet    ondansetron 4 MG ODT tab    oxyCODONE IR 5 MG tablet    senna-docusate 8.6-50 MG per tablet                Protect others around you: Learn how to safely use, store and throw away your medicines at www.disposemymeds.org.        Information about your nerve block     Today you received a block to numb the nerves near your surgery site.    This is a block using local anesthetic or \"numbing\" medication injected around the nerves to anesthetize or \"numb\" the area supplied by those nerves. This block is injected into the muscle layer near your surgical site. The type of anesthesia (Exparel) your anesthesia team used to numb your abdomen may give you relief for up to 72 hours.     Diet: There are no diet restrictions, but you should drink plenty of fluids, unless you are on a fluid-restricted diet.     Activity: If your surgical site is an arm or leg you should be careful with your affected limb, since it is possible to injure your limb without being aware of it due to the numbing. Until full feeling returns, you should guard against bumping or hitting your limb, and avoid extreme hot or cold temperatures on the skin.    Pain Medication: As the block wears off, the feeling will return as a tingling or prickly sensation near your surgical site. You will experience more discomfort from your incisions as the feeling returns. You may want to take a pain pill (a narcotic or Tylenol if this was prescribed by your surgeon) when you start to experience mild pain, before the pain becomes more severe. If your pain medications do not control your pain, you should notify your surgeon. If you are taking narcotics for pain management, do not drink alcohol, " drive a car, or perform hazardous activities.  If you have questions or concerns you may call your surgeon at the number provided with your discharge instructions.     Call your surgeon if you experience blurry vision, ringing in the ears or metallic taste in your mouth.         Information about OPIOIDS     PRESCRIPTION OPIOIDS: WHAT YOU NEED TO KNOW   You have a prescription for an opioid (narcotic) pain medicine. Opioids can cause addiction. If you have a history of chemical dependency of any type, you are at a higher risk of becoming addicted to opioids. Only take this medicine after all other options have been tried. Take it for as short a time and as few doses as possible.     Do not:    Drive. If you drive while taking these medicines, you could be arrested for driving under the influence (DUI).    Operate heavy machinery    Do any other dangerous activities while taking these medicines.     Drink any alcohol while taking these medicines.      Take with any other medicines that contain acetaminophen. Read all labels carefully. Look for the word  acetaminophen  or  Tylenol.  Ask your pharmacist if you have questions or are unsure.    Store your pills in a secure place, locked if possible. We will not replace any lost or stolen medicine. If you don t finish your medicine, please throw away (dispose) as directed by your pharmacist. The [place] Pollution Control Agency has more information about safe disposal: https://www.pca.[facility].[place].us/living-green/managing-unwanted-medications    All opioids tend to cause constipation. Drink plenty of water and eat foods that have a lot of fiber, such as fruits, vegetables, prune juice, apple juice and high-fiber cereal. Take a laxative (Miralax, milk of magnesia, Colace, Senna) if you don t move your bowels at least every other day.              Medication List: This is a list of all your medications and when to take them. Check marks below indicate your daily home  schedule. Keep this list as a reference.      Medications           Morning Afternoon Evening Bedtime As Needed    acetaminophen 325 MG tablet   Commonly known as:  TYLENOL   Take 1,000 mg by mouth   Last time this was given:  975 mg on 5/23/2018  7:36 PM                                aspirin 81 MG EC tablet   Take 2 tablets (162 mg) by mouth daily                                CLARITIN PO                                hydrOXYzine 25 MG tablet   Commonly known as:  ATARAX   Take 1 tablet (25 mg) by mouth every 6 hours as needed for itching (and nausea)                                ibuprofen 600 MG tablet   Commonly known as:  ADVIL/MOTRIN   Take 600 mg by mouth   Last time this was given:  600 mg on 5/24/2018 11:03 AM                                LANsoprazole 30 MG CR capsule   Commonly known as:  PREVACID   Take 1 capsule (30 mg) by mouth 2 times daily Open capsule put in apple sauce                                morphine 15 MG 12 hr tablet   Commonly known as:  MS CONTIN   Take 1 tablet (15 mg) by mouth every 12 hours maximum 2 tablet(s) per day                                ondansetron 4 MG ODT tab   Commonly known as:  ZOFRAN-ODT   Take 1-2 tablets (4-8 mg) by mouth every 8 hours as needed for nausea Dissolve ON the tongue.                                oxyCODONE IR 5 MG tablet   Commonly known as:  ROXICODONE   Take 1-2 tablets (5-10 mg) by mouth every 3 hours as needed for pain or other (Moderate to Severe)   Last time this was given:  5 mg on 5/24/2018  9:29 AM                                senna-docusate 8.6-50 MG per tablet   Commonly known as:  SENOKOT-S;PERICOLACE   Take 1-2 tablets by mouth 2 times daily Take while on oral narcotics to prevent or treat constipation.

## 2018-05-23 NOTE — ANESTHESIA PROCEDURE NOTES
Peripheral Nerve Block Procedure Note    Staff:     Anesthesiologist:  FILI QUIJANO  Location: Pre-op  Procedure Start/Stop TImes:     patient identified, IV checked, site marked, risks and benefits discussed, informed consent, monitors and equipment checked, pre-op evaluation, at physician/surgeon's request and post-op pain management      Correct Patient: Yes      Correct Position: Yes      Correct Site: Yes      Correct Procedure: Yes      Correct Laterality:  Yes    Site Marked:  Yes  Procedure details:     Procedure:  Femoral    Laterality:  Left    Position:  Supine    Sterile Prep: chloraprep, alcohol swabs, mask and sterile gloves      Local skin infiltration:  1% lidocaine    amount (mL):  1    Needle:  Insulated and short bevel    Needle gauge:  22    Needle length (inches):  4    Ultrasound: Yes      Ultrasound used to identify targeted nerve, plexus, or vascular structure and placed a needle adjacent to it      Permanent Image entered into patiient's record      Initial mA:  1    Lowest Motor Response (mA):  0.5    Abnormal pain on injection: No      Blood Aspirated: No      Paresthesias:  No    Bleeding at site: No      Bolus via:  Needle    Infusion Method:  Single Shot    Complications:  None  Assessment/Narrative:     Injection made incrementally with aspirations every (mL):  5

## 2018-05-23 NOTE — DISCHARGE INSTRUCTIONS
Same-Day Surgery   Adult Discharge Orders & Instructions     For 24 hours after surgery:  1. Get plenty of rest.  A responsible adult must stay with you for at least 24 hours after you leave the hospital.   2. Pain medication can slow your reflexes. Do not drive or use heavy equipment.  If you have weakness or tingling, don't drive or use heavy equipment until this feeling goes away.  3. Mixing alcohol and pain medication can cause dizziness and slow your breathing. It can even be fatal. Do not drink alcohol while taking pain medication.  4. Avoid strenuous or risky activities.  Ask for help when climbing stairs.   5. You may feel lightheaded.  If so, sit for a few minutes before standing.  Have someone help you get up.   6. If you have nausea (feel sick to your stomach), drink only clear liquids such as apple juice, ginger ale, broth or 7-Up.  Rest may also help.  Be sure to drink enough fluids.  Move to a regular diet as you feel able. Take pain medications with a small amount of solid food, such as toast or crackers, to avoid nausea.   7. A slight fever is normal. Call the doctor if your fever is over 100 F (37.7 C) (taken under the tongue) or lasts longer than 24 hours.  8. You may have a dry mouth, muscle aches, trouble sleeping or a sore throat.  These symptoms should go away after 24 hours.  9. Do not make important or legal decisions.   Pain Management:      1. Take pain medication (if prescribed) for pain as directed by your physician.        2. WARNING: If the pain medication you have been prescribed contains Tylenol  (acetaminophen), DO NOT take additional doses of Tylenol (acetaminophen).     Call your doctor for any of the followin.  Signs of infection (fever, growing tenderness at the surgery site, severe pain, a large amount of drainage or bleeding, foul-smelling drainage, redness, swelling).    2.  It has been over 8 to 10 hours since surgery and you are still not able to urinate (pee).    3.   Headache for over 24 hours.    4.  Numbness, tingling or weakness the day after surgery (if you had spinal anesthesia).  To contact a doctor, call _____________________________________ or:      [phone] and ask for the Resident On Call for:          __________________________________________ (answered 24 hours a day)      Emergency Department:  [place] [facility]: [phone]  [place] [facility]: [phone]               Rev. 10/2014

## 2018-05-23 NOTE — BRIEF OP NOTE
General acute hospital, [place]    Brief Operative Note    Pre-operative diagnosis: Left Knee Multi Ligament Injury   Post-operative diagnosis Left knee multi ligament injury  Procedure: Procedure(s):  Left Knee  Arthroscopic Anterior Cruciate Ligament Reconstruction with Hamstring Allograft, Fibular Colateral Ligament Reconstruction with Allograft, and Peroneal Nerve Neurolysis - Wound Class: I-Clean   - Wound Class: I-Clean  Surgeon: Surgeon(s) and Role:     * Jarod Meyers MD - Primary     * Naresh Armstrong MD - Assisting     * Jeremy Montero PA-C - Assisting  Anesthesia: Combined General with Femoral Nerve Block   Estimated blood loss: 100cc  Drains: None  Specimens: * No specimens in log *  Findings:   None.  Complications: None.    PLAN:  - Discharge home in stable condition  - MS Contin, Oxycodone, Vistaril for pain  - Dressing change POD#3, SHower POD5  - TTWB with HKB locked in extension  - Follow up in clinic within 2 weeks

## 2018-05-23 NOTE — ANESTHESIA CARE TRANSFER NOTE
Patient: Lj Lamas    Procedure(s):  Left Knee  Arthroscopic Anterior Cruciate Ligament Reconstruction with Hamstring Allograft, Fibular Colateral Ligament Reconstruction with Allograft, and Peroneal Nerve Neurolysis - Wound Class: I-Clean   - Wound Class: I-Clean    Diagnosis: Left Knee Multi Ligament Injury   Diagnosis Additional Information: No value filed.    Anesthesia Type:   General, ETT, Peripheral Nerve Block     Note:  Airway :Face Mask  Patient transferred to:PACU  Handoff Report: Identifed the Patient, Identified the Reponsible Provider, Reviewed the pertinent medical history, Discussed the surgical course, Reviewed Intra-OP anesthesia mangement and issues during anesthesia, Set expectations for post-procedure period and Allowed opportunity for questions and acknowledgement of understanding      Vitals: (Last set prior to Anesthesia Care Transfer)    CRNA VITALS  5/23/2018 1706 - 5/23/2018 1745      5/23/2018             Pulse: 111    SpO2: 99 %    Resp Rate (observed): 12                Electronically Signed By: ALISON Bridges CRNA  May 23, 2018  5:45 PM

## 2018-05-24 VITALS
BODY MASS INDEX: 44.69 KG/M2 | SYSTOLIC BLOOD PRESSURE: 104 MMHG | HEIGHT: 70 IN | DIASTOLIC BLOOD PRESSURE: 57 MMHG | WEIGHT: 312.17 LBS | HEART RATE: 79 BPM | TEMPERATURE: 98.2 F | OXYGEN SATURATION: 96 % | RESPIRATION RATE: 16 BRPM

## 2018-05-24 PROCEDURE — G0378 HOSPITAL OBSERVATION PER HR: HCPCS

## 2018-05-24 PROCEDURE — 25000132 ZZH RX MED GY IP 250 OP 250 PS 637: Performed by: ORTHOPAEDIC SURGERY

## 2018-05-24 PROCEDURE — 25000132 ZZH RX MED GY IP 250 OP 250 PS 637: Performed by: NURSE PRACTITIONER

## 2018-05-24 RX ORDER — IBUPROFEN 400 MG/1
400 TABLET, FILM COATED ORAL EVERY 6 HOURS PRN
Status: DISCONTINUED | OUTPATIENT
Start: 2018-05-24 | End: 2018-05-24

## 2018-05-24 RX ORDER — IBUPROFEN 600 MG/1
600 TABLET, FILM COATED ORAL EVERY 6 HOURS PRN
Status: DISCONTINUED | OUTPATIENT
Start: 2018-05-24 | End: 2018-05-24 | Stop reason: HOSPADM

## 2018-05-24 RX ADMIN — OXYCODONE HYDROCHLORIDE 5 MG: 5 TABLET ORAL at 04:49

## 2018-05-24 RX ADMIN — OXYCODONE HYDROCHLORIDE 5 MG: 5 TABLET ORAL at 12:49

## 2018-05-24 RX ADMIN — OXYCODONE HYDROCHLORIDE 5 MG: 5 TABLET ORAL at 09:29

## 2018-05-24 RX ADMIN — IBUPROFEN 600 MG: 600 TABLET ORAL at 11:03

## 2018-05-24 NOTE — PLAN OF CARE
Problem: Patient Care Overview  Goal: Plan of Care/Patient Progress Review    VS: VSS and afebrile.    O2: Pt is desating when asleep per PACU nurse, given O2 at 2Lpm, denies SOB and chest pain.    Output: Voids adequate amount of urine.    Last BM: 5/22/18 and passing gas   Activity: Declined to get out of bed due to pain. Able to lift self in bed using trapeze bar.    Skin: intact   Pain: Managed with schedule IV Toradol.    CMS: Intact   Dressing: C/D/I   Diet: regular   LDA: PIV at L hand: patent and infusing   Equipment:  brace in place, IV pole, CAPNO and personal belongings at bedside    Plan: TBD.    Additional Info: Admitted at 8A around 9pm.   List all goals to be met before discharge home:   -Adequate pain control using oral analgesics,-YES  -Tolerates oral intake, -GIVEN GINGER ALE AND CRACKER, PT C/O NAUSEA.GIVEN IV MED AND NASAL OINTMENT AND WAS EFFECTIVE.

## 2018-05-24 NOTE — PLAN OF CARE
Problem: Patient Care Overview  Goal: Discharge Needs Assessment  Outcome: Improving  Outpatient/Observation goals to be met before discharge home:    -Return to baseline mental status,   -Hypercapnia, hypoventilation or hypoxia resolved for at least 2 hours without supplemental oxygen,   -Deficits in sensation, mobility or coordination have resolved if spinal or regional anesthesia was used.   -Adequate pain control using oral analgesics,   -Tolerates oral intake,   -Cleared for discharge per Provider.   -Vital signs at baseline.   -Documented spontaneous urine output OR,   -Placement of varela catheter after failed 2nd attempt at spontaneous voiding with documented retention of at least 150cc by bladder scan.   [id number]:  Pt continues to be doing well, resting between cares - did not want anything for nausea and took one oxycodone to stay ahead of pain. No new concerns.

## 2018-05-24 NOTE — PLAN OF CARE
Problem: Patient Care Overview  Goal: Discharge Needs Assessment  Outcome: No Change  Outpatient/Observation goals to be met before discharge home:  -Return to baseline mental status,   -Hypercapnia, hypoventilation or hypoxia resolved for at least 2 hours without supplemental oxygen,   -Deficits in sensation, mobility or coordination have resolved if spinal or regional anesthesia was used.   -Adequate pain control using oral analgesics,   -Tolerates oral intake,   -Cleared for discharge per Provider.   -Vital signs at baseline.   -Documented spontaneous urine output OR,   -Placement of varela catheter after failed 2nd attempt at spontaneous voiding with documented retention of at least 150cc by bladder scan.     [id number]:  Pt overall doing well. Declined scheduled Reglan, pt says medication is working well. Pt on 2L of O2, will try to wean off. Pt only using scheduled pain medications (IV toradol) at this time. VSS, pt stated he has voided already; unlikely he will need varela.

## 2018-05-24 NOTE — OR NURSING
PACU to Inpatient Nursing Handoff    Patient Lj Lamas is a 37 year old male who speaks English.   Procedure Procedure(s):  Left Knee  Arthroscopic Anterior Cruciate Ligament Reconstruction with Hamstring Allograft, Fibular Colateral Ligament Reconstruction with Allograft, and Peroneal Nerve Neurolysis - Wound Class: I-Clean   - Wound Class: I-Clean   Surgeon(s) Primary: Jarod Meyers MD  Assisting: Naresh Armstrong MD; Jeremy Montero PA-C     Not on File    Isolation  No active isolations     Past Medical History   has a past medical history of Gastroesophageal reflux disease; Obese; Other chronic pain; Sleep apnea; and Venous (peripheral) insufficiency (2/9/2017). He also has no past medical history of Anemia; Antiplatelet or antithrombotic long-term use; Arrhythmia; Arthritis; Cerebral artery occlusion with cerebral infarction (H); Chronic infection; Coagulation disorder (H); Complication of anesthesia; Congenital heart disease; Congestive heart failure (H); COPD (chronic obstructive pulmonary disease) (H); Coronary artery disease; Diabetes (H); Dialysis patient (H); Difficult intubation; Dyspnea on exertion; Heart attack; Heart murmur; Hepatitis; Hiatal hernia; History of angina; History of blood transfusion; Hypertension; Irregular heart beat; Liver disease; Malignant hyperthermia; Motion sickness; Multiple sclerosis (H); Muscular dystrophy (H); Noninfectious ileitis; Orthopnea; Oxygen dependent; Pacemaker; Pancreatic disease; Parkinsons disease (H); Pneumonia; PONV (postoperative nausea and vomiting); Pulmonary hypertension; Renal disease; Seizures (H); Spinal headache; Stented coronary artery; Thyroid disease; Tracheostomy in place (H); Uncomplicated asthma; or Walking troubles.    Anesthesia Combined General with Femoral Nerve Block   Dermatome Level     Preop Meds acetaminophen (Tylenol) - time given: 1130  gabapentin (Neurontin) - time given: 1130   Nerve block Femoral (left).   Location:left. Med:Exparel (liposomal bupivacaine). Time given: 1300   Intraop Meds dexamethasone (Decadron)  fentanyl (Sublimaze): 200 mcg total   Local Meds No   Antibiotics cefazolin (Ancef) - last given at 1600     Pain Patient Currently in Pain: yes  Comfort: tolerable with discomfort  Pain Control: partially effective   PACU meds  acetaminophen (Tylenol): 975 mg (total dose) last given at 1800   fentanyl (Sublimaze): 100 mcg (total dose) last given at 1815   hydromorphone (Dilaudid): 1.3 mg (total dose) last given at 2030   ketorolac (Toradol): 30 mg last given at 1900  oxycodone (Roxicodone): 5 mg (total dose) last given at 1930   prochlorperazine (Compazine): 10 mg (total dose) last given at 1850    PCA / epidural No   Capnography     Telemetry ECG Rhythm: Normal sinus rhythm   Inpatient Telemetry Monitor Ordered? No        Labs Glucose Lab Results   Component Value Date    GLC 92 05/23/2018       Hgb Lab Results   Component Value Date    HGB 15.9 05/14/2018       INR No results found for: INR   PACU Imaging Not applicable     Wound/Incision Incision/Surgical Site 05/23/18 Left Knee (Active)   Incision Assessment UTV 5/23/2018  5:38 PM   Karrie-Incision Assessment UTV 5/23/2018  5:38 PM   Closure Sutures;Approximated;Adhesive strip(s) 5/23/2018  5:11 PM   Incision Drainage Amount None 5/23/2018  5:38 PM   Dressing Intervention Clean, dry, intact 5/23/2018  5:38 PM   Number of days:0      CMS Peripheral Neurovascular WDL: WDL (05/23/18 2000)  All Extremities Temperature: warm (05/23/18 2000)  All Extremities Color: patrick (05/23/18 1738)  RLE Sensation: no numbness;no tingling (05/23/18 2000)   Equipment ice pack   Other LDA       IV Access Peripheral IV 05/23/18 Right Hand (Active)   Site Assessment WDL 5/23/2018  5:38 PM   Line Status Infusing 5/23/2018  5:38 PM   Phlebitis Scale 0-->no symptoms 5/23/2018 11:47 AM   Dressing Intervention New dressing  5/23/2018 11:47 AM   Number of days:0       Peripheral IV  05/23/18 Left Hand (Active)   Site Assessment WDL 5/23/2018  5:38 PM   Line Status Saline locked 5/23/2018  5:38 PM   Number of days:0      Blood Products Not applicable    mL   Intake/Output Date 05/23/18 0700 - 05/24/18 0659   Shift 8548-4948 4250-2138 2431-5763 24 Hour Total   I  N  T  A  K  E   I.V. 1063.33 300  1363.33    Shift Total  (mL/kg) 1063.33  (7.51) 300  (2.12)  1363.33  (9.63)   O  U  T  P  U  T   Blood  100  100    Shift Total  (mL/kg)  100  (0.71)  100  (0.71)   Weight (kg) 141.6 141.6 141.6 141.6        Drains / [place]     Time of void PreOp Void Prior to Procedure: 0930 (05/23/18 1124)    PostOp      Diapered? No   Bladder Scan     PO    tolerating sips, crackers and water     Vitals    B/P: 134/67  T: 98.8  F (37.1  C)    Temp src: Oral  P:  Pulse: 85 (05/23/18 1107)    Heart Rate: 88 (05/23/18 1900)     R: 17  O2:  SpO2: 96 %    O2 Device: Nasal cannula (05/23/18 1900)    Oxygen Delivery: 2 LPM (05/23/18 1900)         Family/support present sister Tiera [phone]   Patient belongings Patient Belongings: glasses (b)  Disposition of Belongings: Other (see comment) (labeled and secured)   Patient transported on cart   DC meds/scripts (obs/outpt) Yes, meds   Inpatient Pain Meds Released? Yes       Special needs/considerations None   Tasks needing completion None       Ana Maria Murillo, RN  [facility] [id number]

## 2018-05-24 NOTE — PROGRESS NOTES
Pt is on OBSERVATION STATUS:    08:00AM Pt has slight left knee pain which is well managed and pt declined pain medication at this time. Pt is on a Regular Diet and Appetite is good.  10:00AM Pt has slight knee pain and given 5mg Oxycodone, ice applied, which has given relief.

## 2018-05-24 NOTE — PROGRESS NOTES
"Orthopaedic Surgery Progress Note     Dx: L knee Multi ligament injury  Tx: Left Knee  Arthroscopic Anterior Cruciate Ligament Reconstruction with Hamstring Allograft, Fibular Colateral Ligament Reconstruction with Allograft, and Peroneal Nerve Neurolysis -    S: Pt admitted to OBS overnight for nausea and pain control. Ernesto done well since coming to the floor.    O:   /55 (BP Location: Left arm)  Pulse 85  Temp 98.7  F (37.1  C) (Oral)  Resp 15  Ht 1.778 m (5' 10\")  Wt 141.6 kg (312 lb 2.7 oz)  SpO2 94%  BMI 44.79 kg/m2       Exam:  Gen: NAD, A/O x 3  Resp: Comfortable, non-labored breathing    L LE:  -Wound dressed, c/d/i  -Sens: SILT dp/sp/s/s/t  -Motor: 5/5 EHL/FHL/TA/GSc  -Vasc: 2+ pulses, wwp, brisk cap refill    Recent Labs  Lab 05/23/18  1122   GLC 92          Impression: 37 year old male s/p Left Knee  Arthroscopic Anterior Cruciate Ligament Reconstruction with Hamstring Allograft, Fibular Colateral Ligament Reconstruction with Allograft, and Peroneal Nerve Neurolysis  on  doing well    Plan:-     -Discharge home    - MS Contin, Oxycodone, Vistaril for pain  - Dressing change POD#3, SHower POD5  - TTWB with HKB locked in extension  - Follow up in clinic within 2 weeks     Discussed with Dr Meyers.      Bhavna Escobar, APRN, CNP  Department of Orthopedic Surgery  [facility]  [phone]    For any questions regarding this patient please page me at the above number prior to contacting the ortho resident on call.          "

## 2018-05-24 NOTE — PLAN OF CARE
Problem: Patient Care Overview  Goal: Discharge Needs Assessment  Outcome: No Change  Outpatient/Observation goals to be met before discharge home:    -Return to baseline mental status,   -Hypercapnia, hypoventilation or hypoxia resolved for at least 2 hours without supplemental oxygen,   -Deficits in sensation, mobility or coordination have resolved if spinal or regional anesthesia was used.   -Adequate pain control using oral analgesics,   -Tolerates oral intake,   -Cleared for discharge per Provider.   -Vital signs at baseline.   -Documented spontaneous urine output OR,   -Placement of varela catheter after failed 2nd attempt at spontaneous voiding with documented retention of at least 150cc by bladder scan.   8559-4024:  Pt continues to do well, no new concerns regarding nausea or pain.  Pt mostly likely d/c this AM.

## 2018-05-24 NOTE — OR NURSING
Pt transported upstairs at 2110 by Chinedu STERN.  Discharge meds sent with pt to floor.  Will be locked up by Angela STERN 8A.

## 2018-05-24 NOTE — OR NURSING
Pt with intractable pi needing IV dilaudid for comfort.  Attempted to transfer from cart to recliner chair and pt in excruciating pain.  See EMAR for pain meds given during PACU stay.  Paged ortho resident Dr Velazquez who admitted pt for overnight observation for pain control .

## 2018-05-24 NOTE — PLAN OF CARE
Problem: Patient Care Overview  Goal: Individualization & Mutuality  Pt A/O X 4. Afebrile. VSS. Lungs- Clear bilaterally with both anterior and posterior. IS encouraged. Bowels- Hyperactive in all four quadrants. CMS and Neuro's are intact. Denies numbness and tingling in all extremities. Denies nausea, shortness of breath, and chest pain. Has pain in the left knee and given 5mg Oxycodone, Ibuprofen, ICE applied, and is tolerating well. Voids spontaneously without difficulty into the bedside urinal. Is on a Regular diet and appetite was Good this shift. Left knee incisional dressing is C/D/I, ACE-wrapped, with a leg immobilizer in place. Pt encouraged to ambulate, up with assist X 1 and a FWW. PIV removed from the left hand for discharge. PCD's on BLE's. Bilateral heels are elevated off the bed. Pt is able to make needs known and the call light is within the pt's reach. Pt. discharged at 13:00PM to home, was accompanied by his friend, and left with personal belongings. Pt. received complete discharge paperwork and PO medications as filled by discharge pharmacy. Pt. was given times of last dose for all discharge medications in writing on discharge medication sheets. Discharge teaching included PO medication, pain management, and signs and symptoms of infection. Pt. to follow up with Ortho clinic in 2 weeks. Pt. had no further questions at the time of discharge and no unmet needs were identified.

## 2018-05-24 NOTE — DISCHARGE SUMMARY
"[facility] Orthopaedic Surgery Discharge Summary    Lj Lamas MRN# [id number]   Age: 37 year old YOB: 1980       Date of Admission:  5/23/2018  Date of Discharge::  5/24/2018   Admitting Physician:  Jarod Meyers MD  Discharge To:  Home   Primary Care Physician:      José Kan          Admission Diagnoses:   Left Knee Multi Ligament Injury   Status post anterior cruciate ligament surgery         Discharge Diagnosis:   There are no discharge diagnoses documented for the most recent discharge.            Procedures Performed:   Left Knee  Arthroscopic Anterior Cruciate Ligament Reconstruction with Hamstring Allograft, Fibular Colateral Ligament Reconstruction with Allograft, and Peroneal Nerve Neurolysis          Consultations:   None          Hospital Course:   Patient did well post operatively.  Transitioned from iv medication to oral meds. Was reluctant to take the oral narcotic as \"they make him loopy.\" He was initiated on po ibuprofen. Tolerated diet, resumed normal bowel and bladder function.  He was discharged post op day #1 in stable condition         Medications Prior to Admission:     Prescriptions Prior to Admission   Medication Sig Dispense Refill Last Dose     acetaminophen (TYLENOL) 325 MG tablet Take 1,000 mg by mouth    5/22/2018 at 2130     ibuprofen (ADVIL/MOTRIN) 600 MG tablet Take 600 mg by mouth   5/19/2018 at Unknown time     LANsoprazole (PREVACID) 30 MG CR capsule Take 1 capsule (30 mg) by mouth 2 times daily Open capsule put in apple sauce 30 capsule 3 5/23/2018 at 0930     Loratadine (CLARITIN PO)    Past Month at Unknown time     [DISCONTINUED] oxyCODONE IR (ROXICODONE) 5 MG tablet   0 Past Month at Unknown time     [DISCONTINUED] senna (SENEXON) 8.6 MG tablet    Past Month at Unknown time            Discharge Medications:        Review of your medicines      START taking       Dose / Directions    aspirin 81 MG EC tablet   Used for:  Left knee " pain, unspecified chronicity        Dose:  162 mg   Take 2 tablets (162 mg) by mouth daily   Quantity:  60 tablet   Refills:  0       hydrOXYzine 25 MG tablet   Commonly known as:  ATARAX   Used for:  Left knee pain, unspecified chronicity        Dose:  25 mg   Take 1 tablet (25 mg) by mouth every 6 hours as needed for itching (and nausea)   Quantity:  40 tablet   Refills:  0       morphine 15 MG 12 hr tablet   Commonly known as:  MS CONTIN   Used for:  Left knee pain, unspecified chronicity        Dose:  15 mg   Take 1 tablet (15 mg) by mouth every 12 hours maximum 2 tablet(s) per day   Quantity:  6 tablet   Refills:  0       ondansetron 4 MG ODT tab   Commonly known as:  ZOFRAN-ODT   Used for:  Left knee pain, unspecified chronicity        Dose:  4-8 mg   Take 1-2 tablets (4-8 mg) by mouth every 8 hours as needed for nausea Dissolve ON the tongue.   Quantity:  10 tablet   Refills:  0       senna-docusate 8.6-50 MG per tablet   Commonly known as:  SENOKOT-S;PERICOLACE   Used for:  Left knee pain, unspecified chronicity        Dose:  1-2 tablet   Take 1-2 tablets by mouth 2 times daily Take while on oral narcotics to prevent or treat constipation.   Quantity:  30 tablet   Refills:  0         CONTINUE these medicines which may have CHANGED, or have new prescriptions. If we are uncertain of the size of tablets/capsules you have at home, strength may be listed as something that might have changed.       Dose / Directions    oxyCODONE IR 5 MG tablet   Commonly known as:  ROXICODONE   This may have changed:    - how much to take  - how to take this  - when to take this  - reasons to take this   Used for:  Left knee pain, unspecified chronicity        Dose:  5-10 mg   Take 1-2 tablets (5-10 mg) by mouth every 3 hours as needed for pain or other (Moderate to Severe)   Quantity:  60 tablet   Refills:  0         CONTINUE these medicines which have NOT CHANGED       Dose / Directions    acetaminophen 325 MG tablet   Commonly  known as:  TYLENOL        Dose:  1000 mg   Take 1,000 mg by mouth   Refills:  0       CLARITIN PO        Refills:  0       ibuprofen 600 MG tablet   Commonly known as:  ADVIL/MOTRIN        Dose:  600 mg   Take 600 mg by mouth   Refills:  0       LANsoprazole 30 MG CR capsule   Commonly known as:  PREVACID   Used for:  Breen's esophagus        Dose:  30 mg   Take 1 capsule (30 mg) by mouth 2 times daily Open capsule put in apple sauce   Quantity:  30 capsule   Refills:  3         STOP taking          SENEXON 8.6 MG tablet   Generic drug:  senna                Where to get your medicines      Some of these will need a paper prescription and others can be bought over the counter. Ask your nurse if you have questions.     Bring a paper prescription for each of these medications      aspirin 81 MG EC tablet     hydrOXYzine 25 MG tablet     morphine 15 MG 12 hr tablet     ondansetron 4 MG ODT tab     oxyCODONE IR 5 MG tablet     senna-docusate 8.6-50 MG per tablet                     Pending Results at Discharge:   None         Discharge Instructions:     Discharge Procedure Orders  Discharge Instructions   Order Comments: Review outpatient procedure discharge instructions with patient as directed by Provider     Ice to affected area   Order Comments: Ice pack to surgical site 3-4 hours for 15 minutes for swelling and pain     Shower    Order Comments: Cover dressing if dressing is not going to be changed today     Dressing Change   Order Comments: Change dressing on third day after surgery.     Wound care   Order Comments: Do not immerse wound in water until sutures removed     Diet as Tolerated   Order Comments: Return to diet before surgery, unless instructed otherwise.     Weight bearing status - Toe touch   Order Comments: With crutches and HKB locked in extension     Return to clinic   Order Comments: Return to clinic within 2 weeks.  Call to confirm date and time.     No driving or operating machinery    Order  Comments: until the day after procedure     No Alcohol   Order Comments: For 24 hours post procedure     Discharge Instructions - diet   Order Comments: Resume pre procedure diet.     NO driving or operating machinery    Order Comments: until the day after the procedure.     Do not immerse incision in water    Order Comments: until the sutures are removed.     Dressing   Order Comments: Keep dressing clean and dry.  Dressing / incision care as instructed by Provider and/or Nurse.     Ice to affected area   Order Comments: Ice to operative site, as needed.     Discharge Instructions   Order Comments: Follow up appointment as instructed by Provider and/or Nurse.       No future appointments.    ALISON Woodard, CNP  Department of Orthopedic Surgery  [facility]  [phone]

## 2018-05-24 NOTE — PLAN OF CARE
Problem: Patient Care Overview  Goal: Plan of Care/Patient Progress Review  Outcome: No Change  Patient A/Ox4. VSS. Denies CP, SOB, dizziness/LH. LSCTA. +fl/BS. Voiding well in urinal. CMS intact. Dressing to knee CDI, ice applied, pt wearing brace. Tolerating clear liquid diet without NV, symptoms have mostly been relieved with compazine and zofran - will advance for morning. IS encouraged. Activity level is good, pt has slept between cares. IV is now SL. Pain rated as comfortably managed throughout shift, pt has not requested anything for pain. Patient has demonstrated ability to call appropriately. Patient is resting with call light within reach. Will continue to monitor.

## 2018-05-24 NOTE — PLAN OF CARE
Problem: Patient Care Overview  Goal: Discharge Needs Assessment  Outcome: No Change  Outpatient/Observation goals to be met before discharge home:    -Return to baseline mental status,   -Hypercapnia, hypoventilation or hypoxia resolved for at least 2 hours without supplemental oxygen,   -Deficits in sensation, mobility or coordination have resolved if spinal or regional anesthesia was used.   -Adequate pain control using oral analgesics,   -Tolerates oral intake,   -Cleared for discharge per Provider.   -Vital signs at baseline.   -Documented spontaneous urine output OR,   -Placement of varela catheter after failed 2nd attempt at spontaneous voiding with documented retention of at least 150cc by bladder scan.   [id number]:  Pt visited at 0200.  No new concerns. Pt declined any need for medications. Resting between cares.

## 2018-05-25 ENCOUNTER — OFFICE VISIT (OUTPATIENT)
Dept: URGENT CARE | Facility: URGENT CARE | Age: 38
End: 2018-05-25
Payer: COMMERCIAL

## 2018-05-25 ENCOUNTER — TELEPHONE (OUTPATIENT)
Dept: FAMILY MEDICINE | Facility: CLINIC | Age: 38
End: 2018-05-25

## 2018-05-25 VITALS
OXYGEN SATURATION: 97 % | TEMPERATURE: 98.2 F | DIASTOLIC BLOOD PRESSURE: 75 MMHG | SYSTOLIC BLOOD PRESSURE: 110 MMHG | HEART RATE: 84 BPM

## 2018-05-25 DIAGNOSIS — K64.4 EXTERNAL HEMORRHOIDS: Primary | ICD-10-CM

## 2018-05-25 PROCEDURE — 99213 OFFICE O/P EST LOW 20 MIN: CPT | Performed by: NURSE PRACTITIONER

## 2018-05-25 NOTE — PATIENT INSTRUCTIONS
If this does not improve in a couple weeks, please follow up with colorectal       Take Tylenol, Ibuprofen or Aleve for pain.   Ibuprofen and Aleve are both antiinflammatories so you should never take these together during the same 24 hour period.  If you take a high dose of Ibuprofen, do not take it consistently for more than 5 days   Tylenol only helps with pain and does not help with inflammation. Tylenol is the safest option if you have kidney or heart history.  You have to take at least 600mg of ibuprofen to get the antiinflammatory affect. 200-400mg of Ibuprofen will only help with pain.  It is ok to take Tylenol with Ibuprofen or Aleve  Do not take more than:  Tylenol (acetaminophen)  1000 mg 3 times a day  Ibuprofen (Advil/Motrin) 600 4 times a day or 800 mg 3 times a day WITH FOOD  Aleve 220mg 2 pills twice a day WITH FOOD    Get a stool softener and take extra fiber       Hemorrhoids    Hemorrhoids are swollen and inflamed veins inside the rectum and near the anus. The rectum is the last several inches of the colon. The anus is the passage between the rectum and the outside of the body.  Causes  The veins can become swollen due to increased pressure in them. This is most often caused by:    Chronic constipation or diarrhea    Straining when having a bowel movement    Sitting too long on the toilet    A low-fiber diet    Pregnancy  Symptoms    Bleeding from the rectum (this may be noticeable after bowel movements)    Lump near the anus    Itching around the anus    Pain around the anus  There are different types of hemorrhoids. Depending on the type you have and the severity, you may be able to treat yourself at home. In some cases, a procedure may be the best treatment option. Your healthcare provider can tell you more about this, if needed.  Home care  General care    To get relief from pain or itching, try:  ? Medicines. Your healthcare provider may recommend stool softeners, suppositories, or laxatives  to help manage constipation. Use these exactly as directed.  ? Sitz baths. A sitz bath involves sitting in a few inches of warm bath water. Be careful not to make the water so hot that you burn yourself--test it before sitting in it. Soak for about 10 to 15 minutes a few times a day. This may help relieve pain.  ? Topical products. Your healthcare provider may prescribe or recommend creams, ointments, or pads that can be applied to the hemorrhoid. Use these exactly as directed.  Tips to help prevent hemorrhoids    Eat more fiber. Fiber adds bulk to stool and absorbs water as it moves through your colon. This makes stool softer and easier to pass.  ? Increase the fiber in your diet with more fiber-rich foods. These include fresh fruit, vegetables, and whole grains.  ? Take a fiber supplement or bulking agent, if advised by your healthcare provider. These include products such as psyllium or methylcellulose.    Drink more water. Your healthcare provider may direct you to drink plenty of water. This can help keep stool soft.    Be more active. Frequent exercise aids digestion and helps prevent constipation. It may also help make bowel movements more regular.    Don t strain during bowel movements. This can make hemorrhoids more likely. Also, don t sit on the toilet for long periods of time.  Follow-up care  Follow up with your healthcare provider as advised. If a culture or imaging tests were done, someone will let you know the results when they are ready. This may take a few days or longer. If your healthcare provider recommends a procedure for your hemorrhoids, these options can be discussed. Options may include surgery and outpatient office treatments.  When to seek medical advice  Call your healthcare provider right away if any of these occur:    Increased bleeding from the rectum    Increased pain around the rectum or anus    Weakness or dizziness  Call 911  Call 911 if any of these occur:    Trouble breathing or  swallowing    Fainting or loss of consciousness    Unusually fast heart rate    Vomiting blood    Large amounts of blood in stool or black, tarry stools  Date Last Reviewed: 9/1/2017 2000-2017 [employer]. [address] [place] [address], [place] [address]. All rights reserved. This information is not intended as a substitute for professional medical care. Always follow your healthcare professional's instructions.

## 2018-05-25 NOTE — TELEPHONE ENCOUNTER
Status Post Anterior Cruciate Ligament Surgery, Left Knee Multi Ligament Injury 05/24/2018 6 mo ED/IP 1/0  [phone] (home)

## 2018-05-25 NOTE — TELEPHONE ENCOUNTER
Pt requesting a call back from a nurse.  He noticed blood by his anus this a.m.  There was a lot at first and now it's much less.  Should he go in and be seen for that?  Conrado may be reached at [phone]. Ok to leave a detailed message.

## 2018-05-25 NOTE — TELEPHONE ENCOUNTER
"ED / Discharge Outreach Protocol    Patient Contact    Attempt # 1    Was call answered?  Yes.  \"May I please speak with <Lj>\"  Is patient available?   Yes    ED/Discharge Protocol    \"Hi, my name is Chioma Carvalho, a registered nurse, and I am calling on behalf of Dr. Kan's office at [place].  I am calling to follow up and see how things are going for you after your recent visit.\"    \"I see that you were in the (ER/UC/IP) on 5/23/18-5/24/18.    How are you doing now that you are home?\" Doing okay. Some swelling in knee area/upper thigh, foot is a little. Denies warmth/redness. Pain on side of leg/brace rubs up on area. Throbbing in right temple - hit head on trapeze. Noticed some blood near his bottom but thinks it might be blood they did not wipe down well after surgery - he will come in if having any blood in stool. 2 aspirin daily. Has been ibuprofen. Swelling gone down a little bit. Yesterday evening swelling was worse. Has not had any BMs after surgery - has not taken stool softener. Has been eating. Icing knee. No chest pain or SOB. Some congestion.     Discussed with patient symptoms to watch for - when to go to ER, when should call surgeon office, what symptoms require a visit back to the clinic/urgent care     Is patient experiencing symptoms that may require a hospital visit?  No     Discharge Instructions    \"Let's review your discharge instructions.  What is/are the follow-up recommendations?  Pt. Response: Follow up with PT on 5/29/18 and surgeon on 5/30/18. Take dressing off tomorrow - sister will assist     \"Were you instructed to make a follow-up appointment?\"  Pt. Response: No.       \"When you see the provider, I would recommend that you bring your discharge instructions with you.    Medications    \"How many new medications are you on since your hospitalization/ED visit?\"    0-1  \"How many of your current medicines changed (dose, timing, name, etc.) while you were in the hospital/ED " "visit?\"   0-1  \"Do you have questions about your medications?\"   No  \"Were you newly diagnosed with heart failure, COPD, diabetes or did you have a heart attack?\"   No  For patients on insulin: \"Did you start on insulin in the hospital or did you have your insulin dose changed?\"   No    Medication reconciliation completed? Yes    Was MTM referral placed (*Make sure to put transitions as reason for referral)?   No    Call Summary    \"Do you have any questions or concerns about your condition or care plan at the moment?\"    No  Triage nurse advice given: N/A    Patient was in ER 6 times in the past year (assess appropriateness of ER visits.)      \"If you have questions or things don't continue to improve, we encourage you contact us through the main clinic number,  ([phone])  .  Even if the clinic is not open, triage nurses are available 24/7 to help you.     We would like you to know that our clinic has extended hours (provide information).  We also have urgent care (provide details on closest location and hours/contact info)\"      \"Thank you for your time and take care!\"    Chioma COCHRAN RN      "

## 2018-05-25 NOTE — MR AVS SNAPSHOT
After Visit Summary   5/25/2018    Lj Lamas    MRN: [id number]           Patient Information     Date Of Birth          1980        Visit Information        Provider Department      5/25/2018 5:20 PM Lin Hicks APRN [facility]        Today's Diagnoses     External hemorrhoids    -  1      Care Instructions    If this does not improve in a couple weeks, please follow up with colorectal       Take Tylenol, Ibuprofen or Aleve for pain.   Ibuprofen and Aleve are both antiinflammatories so you should never take these together during the same 24 hour period.  If you take a high dose of Ibuprofen, do not take it consistently for more than 5 days   Tylenol only helps with pain and does not help with inflammation. Tylenol is the safest option if you have kidney or heart history.  You have to take at least 600mg of ibuprofen to get the antiinflammatory affect. 200-400mg of Ibuprofen will only help with pain.  It is ok to take Tylenol with Ibuprofen or Aleve  Do not take more than:  Tylenol (acetaminophen)  1000 mg 3 times a day  Ibuprofen (Advil/Motrin) 600 4 times a day or 800 mg 3 times a day WITH FOOD  Aleve 220mg 2 pills twice a day WITH FOOD    Get a stool softener and take extra fiber       Hemorrhoids    Hemorrhoids are swollen and inflamed veins inside the rectum and near the anus. The rectum is the last several inches of the colon. The anus is the passage between the rectum and the outside of the body.  Causes  The veins can become swollen due to increased pressure in them. This is most often caused by:    Chronic constipation or diarrhea    Straining when having a bowel movement    Sitting too long on the toilet    A low-fiber diet    Pregnancy  Symptoms    Bleeding from the rectum (this may be noticeable after bowel movements)    Lump near the anus    Itching around the anus    Pain around the anus  There are different types of hemorrhoids.  Depending on the type you have and the severity, you may be able to treat yourself at home. In some cases, a procedure may be the best treatment option. Your healthcare provider can tell you more about this, if needed.  Home care  General care    To get relief from pain or itching, try:  ? Medicines. Your healthcare provider may recommend stool softeners, suppositories, or laxatives to help manage constipation. Use these exactly as directed.  ? Sitz baths. A sitz bath involves sitting in a few inches of warm bath water. Be careful not to make the water so hot that you burn yourself--test it before sitting in it. Soak for about 10 to 15 minutes a few times a day. This may help relieve pain.  ? Topical products. Your healthcare provider may prescribe or recommend creams, ointments, or pads that can be applied to the hemorrhoid. Use these exactly as directed.  Tips to help prevent hemorrhoids    Eat more fiber. Fiber adds bulk to stool and absorbs water as it moves through your colon. This makes stool softer and easier to pass.  ? Increase the fiber in your diet with more fiber-rich foods. These include fresh fruit, vegetables, and whole grains.  ? Take a fiber supplement or bulking agent, if advised by your healthcare provider. These include products such as psyllium or methylcellulose.    Drink more water. Your healthcare provider may direct you to drink plenty of water. This can help keep stool soft.    Be more active. Frequent exercise aids digestion and helps prevent constipation. It may also help make bowel movements more regular.    Don t strain during bowel movements. This can make hemorrhoids more likely. Also, don t sit on the toilet for long periods of time.  Follow-up care  Follow up with your healthcare provider as advised. If a culture or imaging tests were done, someone will let you know the results when they are ready. This may take a few days or longer. If your healthcare provider recommends a  procedure for your hemorrhoids, these options can be discussed. Options may include surgery and outpatient office treatments.  When to seek medical advice  Call your healthcare provider right away if any of these occur:    Increased bleeding from the rectum    Increased pain around the rectum or anus    Weakness or dizziness  Call 911  Call 911 if any of these occur:    Trouble breathing or swallowing    Fainting or loss of consciousness    Unusually fast heart rate    Vomiting blood    Large amounts of blood in stool or black, tarry stools  Date Last Reviewed: 9/1/2017 2000-2017 [employer]. [address] [place] [address]. All rights reserved. This information is not intended as a substitute for professional medical care. Always follow your healthcare professional's instructions.                Follow-ups after your visit        Additional Services     COLORECTAL SURGERY REFERRAL       Your provider has referred you to: [facility]: Colon and Rectal Surgery Clinic [facility] [phone]   [email or web address]    Referral Reason(s): Hemorrhoids  Special Concerns: ASA use after surgery  This referral is: Elective (week +)  It is OK to leave a message on patient's voicemail.    Please be aware that coverage of these services is subject to the terms and limitations of your health insurance plan.  Call member services at your health plan with any benefit or coverage questions.      Please bring the following with you to your appointment:    (1) Any X-Rays, CTs or MRIs which have been performed.  Contact the facility where they were done to arrange for  prior to your scheduled appointment.    (2) List of current medications  (3) This referral request   (4) Any documents/labs given to you for this referral                  Your next 10 appointments already scheduled     Sep 05, 2018   Procedure with Charbel Wise MD   [facility], [place],  Endoscopy ([facility], [facility])    [address]  [place] [address] [phone]           The [facility] is located on the corner of [facility] and [facility] on the [facility]. It is easily accessible from virtually any point in the [facility] area, via [address] and [address].              Who to contact     If you have questions or need follow up information about today's clinic visit or your schedule please contact [facility] directly at [phone].  Normal or non-critical lab and imaging results will be communicated to you by Andre Phillipehart, letter or phone within 4 business days after the clinic has received the results. If you do not hear from us within 7 days, please contact the clinic through [employer] or phone. If you have a critical or abnormal lab result, we will notify you by phone as soon as possible.  Submit refill requests through [employer] or call your pharmacy and they will forward the refill request to us. Please allow 3 business days for your refill to be completed.          Additional Information About Your Visit        Andre Phillipehart Information     [employer] gives you secure access to your electronic health record. If you see a primary care provider, you can also send messages to your care team and make appointments. If you have questions, please call your primary care clinic.  If you do not have a primary care provider, please call [phone] and they will assist you.        Care EveryWhere ID     This is your Care EveryWhere ID. This could be used by other organizations to access your [facility] medical records  [id number]        Your Vitals Were     Pulse Temperature Pulse Oximetry             84 98.2  F (36.8  C) 97%          Blood Pressure from Last 3 Encounters:   05/25/18 110/75   05/24/18 104/57   05/14/18 123/74    Weight from Last 3 Encounters:   05/23/18 312 lb 2.7 oz (141.6 kg)   05/14/18 (!)  334 lb (151.5 kg)   03/22/18 (!) 334 lb (151.5 kg)              We Performed the Following     COLORECTAL SURGERY REFERRAL        Primary Care Provider Office Phone # Fax #    José Kan [phone] [address] [address]        Equal Access to Services     [facility]: Hadii aad ku hadasho Soomaali, waaxda luqadaha, qaybta kaalmada adeegyada, waxay idiin hayaan adeeg kharash la'aan . So [facility] [phone].    ATENCIÓN: Si habla español, tiene a de leon disposición servicios gratuitos de asistencia lingüística. Llame al [phone].    We comply with applicable federal civil rights laws and [place] laws. We do not discriminate on the basis of race, color, national origin, age, disability, sex, sexual orientation, or gender identity.            Thank you!     Thank you for choosing [facility]  for your care. Our goal is always to provide you with excellent care. Hearing back from our patients is one way we can continue to improve our services. Please take a few minutes to complete the written survey that you may receive in the mail after your visit with us. Thank you!             Your Updated Medication List - Protect others around you: Learn how to safely use, store and throw away your medicines at www.disposemymeds.org.          This list is accurate as of 5/25/18  6:27 PM.  Always use your most recent med list.                   Brand Name Dispense Instructions for use Diagnosis    acetaminophen 325 MG tablet    TYLENOL     Take 1,000 mg by mouth        aspirin 81 MG EC tablet     60 tablet    Take 2 tablets (162 mg) by mouth daily    Left knee pain, unspecified chronicity       CLARITIN PO           hydrOXYzine 25 MG tablet    ATARAX    40 tablet    Take 1 tablet (25 mg) by mouth every 6 hours as needed for itching (and nausea)    Left knee pain, unspecified chronicity       ibuprofen 600 MG tablet    ADVIL/MOTRIN     Take 600 mg by mouth         LANsoprazole 30 MG CR capsule    PREVACID    30 capsule    Take 1 capsule (30 mg) by mouth 2 times daily Open capsule put in apple sauce    Breen's esophagus       morphine 15 MG 12 hr tablet    MS CONTIN    6 tablet    Take 1 tablet (15 mg) by mouth every 12 hours maximum 2 tablet(s) per day    Left knee pain, unspecified chronicity       ondansetron 4 MG ODT tab    ZOFRAN-ODT    10 tablet    Take 1-2 tablets (4-8 mg) by mouth every 8 hours as needed for nausea Dissolve ON the tongue.    Left knee pain, unspecified chronicity       oxyCODONE IR 5 MG tablet    ROXICODONE    60 tablet    Take 1-2 tablets (5-10 mg) by mouth every 3 hours as needed for pain or other (Moderate to Severe)    Left knee pain, unspecified chronicity       senna-docusate 8.6-50 MG per tablet    SENOKOT-S;PERICOLACE    30 tablet    Take 1-2 tablets by mouth 2 times daily Take while on oral narcotics to prevent or treat constipation.    Left knee pain, unspecified chronicity

## 2018-05-25 NOTE — TELEPHONE ENCOUNTER
"Spoke with Lj   Having rectal bleeding   No blood in stool   1-2 days before surgery had some sores in area - thinks are opening   Is actively bleeding     Not lightheaded or faint, was earlier before eating  No vomiting   Blood is red \"rust red\"   No clots  Has been taking Ibuprofen   Not feeling feverish   No abdominal pain  Has not had problems with this in the past     Advised OV to evaluate further  Offered to schedule pt - does not think he could make it by the end of clinic day for appointment so he will come to urgent care instead    Chioma COCHRAN RN    "

## 2018-05-25 NOTE — PROGRESS NOTES
HPI      Chief Complaint   Patient presents with     Urgent Care     Rectal Problem     Rectal bleeding. Noticed this morning.        Blood noticed this morning with wiping without BM. There was quite a bit of blood.    Had a BM without blood   No black stool   Had a sore there before the surgery for a few days   Was having constipation prior to surgery 2 days ago and still is somewhat constipated   Is taking a stool softener       Past Medical History:   Diagnosis Date     Gastroesophageal reflux disease      Obese      Other chronic pain     low back pain - compressed vertebrae     Sleep apnea     mild - does not use CPAP     Venous (peripheral) insufficiency 2/9/2017     Family History   Problem Relation Age of Onset     DIABETES Mother      HEART DISEASE Father      DIABETES Maternal Grandfather      Lung Cancer Maternal Grandfather      Bone Cancer Maternal Grandfather      CANCER Paternal Grandfather      Past Surgical History:   Procedure Laterality Date     ARTHROSCOPIC  RECONSTRUCTION/REPAIR LATERAL LIGAMENT KNEE Left 5/23/2018    Procedure: ARTHROSCOPIC  RECONSTRUCTION/REPAIR LATERAL LIGAMENT KNEE;;  Surgeon: Jarod Meyers MD;  Location: [facility] OR     ARTHROSCOPIC RECONSTRUCTION ANTERIOR CRUCIATE LIGAMENT Left 5/23/2018    Procedure: ARTHROSCOPIC RECONSTRUCTION ANTERIOR CRUCIATE LIGAMENT;  Left Knee  Arthroscopic Anterior Cruciate Ligament Reconstruction with Hamstring Allograft, Fibular Colateral Ligament Reconstruction with Allograft, and Peroneal Nerve Neurolysis;  Surgeon: Jarod Meyers MD;  Location: [facility] OR     ESOPHAGOSCOPY, GASTROSCOPY, DUODENOSCOPY (EGD), COMBINED N/A 3/28/2018    Procedure: COMBINED ESOPHAGOSCOPY, GASTROSCOPY, DUODENOSCOPY (EGD);  egd;  Surgeon: Charbel Wise MD;  Location: [facility] GI     NO HISTORY OF SURGERY       Social History   Substance Use Topics     Smoking status: Never Smoker     Smokeless tobacco: Never Used      Comment: occass cigars in college      Alcohol use 0.0 oz/week     0 Standard drinks or equivalent per week      Comment: 2-4 drinks per month     Current Outpatient Prescriptions   Medication Sig Dispense Refill     hydrocortisone (ANUSOL-HC) 2.5 % cream Place rectally 2 times daily 30 g 0     acetaminophen (TYLENOL) 325 MG tablet Take 1,000 mg by mouth        aspirin 81 MG EC tablet Take 2 tablets (162 mg) by mouth daily 60 tablet 0     hydrOXYzine (ATARAX) 25 MG tablet Take 1 tablet (25 mg) by mouth every 6 hours as needed for itching (and nausea) 40 tablet 0     ibuprofen (ADVIL/MOTRIN) 600 MG tablet Take 600 mg by mouth       LANsoprazole (PREVACID) 30 MG CR capsule Take 1 capsule (30 mg) by mouth 2 times daily Open capsule put in apple sauce 30 capsule 3     Loratadine (CLARITIN PO)        morphine (MS CONTIN) 15 MG 12 hr tablet Take 1 tablet (15 mg) by mouth every 12 hours maximum 2 tablet(s) per day 6 tablet 0     ondansetron (ZOFRAN-ODT) 4 MG ODT tab Take 1-2 tablets (4-8 mg) by mouth every 8 hours as needed for nausea Dissolve ON the tongue. 10 tablet 0     oxyCODONE IR (ROXICODONE) 5 MG tablet Take 1-2 tablets (5-10 mg) by mouth every 3 hours as needed for pain or other (Moderate to Severe) 60 tablet 0     senna-docusate (SENOKOT-S;PERICOLACE) 8.6-50 MG per tablet Take 1-2 tablets by mouth 2 times daily Take while on oral narcotics to prevent or treat constipation. 30 tablet 0     Not on File    Reviewed and updated as needed this visit by clinical staff and provider        ROS  Detailed as above       /75  Pulse 84  Temp 98.2  F (36.8  C)  SpO2 97%    Physical Exam   Constitutional: He is well-developed, well-nourished, and in no distress.   Genitourinary:   Genitourinary Comments: Tender semithrombosed external hemorrhoid with small amt of blood   Musculoskeletal:   Splint left leg   Neurological: He is alert.   Psychiatric: Mood and affect normal.   Vitals reviewed.      Assessment and Plan:       ICD-10-CM    1. External  hemorrhoids K64.4 COLORECTAL SURGERY REFERRAL     hydrocortisone (ANUSOL-HC) 2.5 % cream       Single hemorrhoid noted on exam. Will treat with anusol and treating constipation. Referral to colorectal made should this not improve     ALISON Davidson, CNP  [facility]

## 2018-05-25 NOTE — TELEPHONE ENCOUNTER
"[facility] Orthopaedic Surgery Discharge Summary     Lj Lamas MRN# [id number]   Age: 37 year old YOB: 1980         Date of Admission:                                  5/23/2018  Date of Discharge::                                 5/24/2018   Admitting Physician:                                          Jarod Meyers MD  Discharge To:                                         Home   Primary Care Physician:      José Kan           Admission Diagnoses:   Left Knee Multi Ligament Injury   Status post anterior cruciate ligament surgery          Discharge Diagnosis:   There are no discharge diagnoses documented for the most recent discharge.              Procedures Performed:   Left Knee  Arthroscopic Anterior Cruciate Ligament Reconstruction with Hamstring Allograft, Fibular Colateral Ligament Reconstruction with Allograft, and Peroneal Nerve Neurolysis           Consultations:   None           Hospital Course:   Patient did well post operatively.  Transitioned from iv medication to oral meds. Was reluctant to take the oral narcotic as \"they make him loopy.\" He was initiated on po ibuprofen. Tolerated diet, resumed normal bowel and bladder function.  He was discharged post op day #1 in stable condition          Medications Prior to Admission:       Prescriptions Prior to Admission    Prescriptions Prior to Admission   Medication Sig Dispense Refill Last Dose     acetaminophen (TYLENOL) 325 MG tablet Take 1,000 mg by mouth      5/22/2018 at 2130     ibuprofen (ADVIL/MOTRIN) 600 MG tablet Take 600 mg by mouth     5/19/2018 at Unknown time     LANsoprazole (PREVACID) 30 MG CR capsule Take 1 capsule (30 mg) by mouth 2 times daily Open capsule put in apple sauce 30 capsule 3 5/23/2018 at 0930     Loratadine (CLARITIN PO)       Past Month at Unknown time     [DISCONTINUED] oxyCODONE IR (ROXICODONE) 5 MG tablet     0 Past Month at Unknown time     [DISCONTINUED] senna (SENEXON) 8.6 " MG tablet       Past Month at Unknown time                 Discharge Medications:          Review of your medicines       START taking        Dose / Directions     aspirin 81 MG EC tablet   Used for:  Left knee pain, unspecified chronicity          Dose:  162 mg   Take 2 tablets (162 mg) by mouth daily   Quantity:  60 tablet   Refills:  0         hydrOXYzine 25 MG tablet   Commonly known as:  ATARAX   Used for:  Left knee pain, unspecified chronicity          Dose:  25 mg   Take 1 tablet (25 mg) by mouth every 6 hours as needed for itching (and nausea)   Quantity:  40 tablet   Refills:  0         morphine 15 MG 12 hr tablet   Commonly known as:  MS CONTIN   Used for:  Left knee pain, unspecified chronicity          Dose:  15 mg   Take 1 tablet (15 mg) by mouth every 12 hours maximum 2 tablet(s) per day   Quantity:  6 tablet   Refills:  0         ondansetron 4 MG ODT tab   Commonly known as:  ZOFRAN-ODT   Used for:  Left knee pain, unspecified chronicity          Dose:  4-8 mg   Take 1-2 tablets (4-8 mg) by mouth every 8 hours as needed for nausea Dissolve ON the tongue.   Quantity:  10 tablet   Refills:  0         senna-docusate 8.6-50 MG per tablet   Commonly known as:  SENOKOT-S;PERICOLACE   Used for:  Left knee pain, unspecified chronicity          Dose:  1-2 tablet   Take 1-2 tablets by mouth 2 times daily Take while on oral narcotics to prevent or treat constipation.   Quantity:  30 tablet   Refills:  0          CONTINUE these medicines which may have CHANGED, or have new prescriptions. If we are uncertain of the size of tablets/capsules you have at home, strength may be listed as something that might have changed.        Dose / Directions     oxyCODONE IR 5 MG tablet   Commonly known as:  ROXICODONE   This may have changed:    - how much to take  - how to take this  - when to take this  - reasons to take this   Used for:  Left knee pain, unspecified chronicity          Dose:  5-10 mg   Take 1-2 tablets (5-10  mg) by mouth every 3 hours as needed for pain or other (Moderate to Severe)   Quantity:  60 tablet   Refills:  0          CONTINUE these medicines which have NOT CHANGED        Dose / Directions     acetaminophen 325 MG tablet   Commonly known as:  TYLENOL          Dose:  1000 mg   Take 1,000 mg by mouth   Refills:  0         CLARITIN PO          Refills:  0         ibuprofen 600 MG tablet   Commonly known as:  ADVIL/MOTRIN          Dose:  600 mg   Take 600 mg by mouth   Refills:  0         LANsoprazole 30 MG CR capsule   Commonly known as:  PREVACID   Used for:  Breen's esophagus          Dose:  30 mg   Take 1 capsule (30 mg) by mouth 2 times daily Open capsule put in apple sauce   Quantity:  30 capsule   Refills:  3          STOP taking            SENEXON 8.6 MG tablet   Generic drug:  senna                             Where to get your medicines            Some of these will need a paper prescription and others can be bought over the counter. Ask your nurse if you have questions.           Bring a paper prescription for each of these medications        aspirin 81 MG EC tablet     hydrOXYzine 25 MG tablet     morphine 15 MG 12 hr tablet     ondansetron 4 MG ODT tab     oxyCODONE IR 5 MG tablet     senna-docusate 8.6-50 MG per tablet                           Pending Results at Discharge:   None          Discharge Instructions:      Discharge Procedure Orders      Discharge Instructions   Order Comments: Review outpatient procedure discharge instructions with patient as directed by Provider          Ice to affected area   Order Comments: Ice pack to surgical site 3-4 hours for 15 minutes for swelling and pain          Shower    Order Comments: Cover dressing if dressing is not going to be changed today          Dressing Change   Order Comments: Change dressing on third day after surgery.          Wound care   Order Comments: Do not immerse wound in water until sutures removed          Diet as Tolerated   Order  Comments: Return to diet before surgery, unless instructed otherwise.          Weight bearing status - Toe touch   Order Comments: With crutches and HKB locked in extension      Return to clinic   Order Comments: Return to clinic within 2 weeks.  Call to confirm date and time.          No driving or operating machinery    Order Comments: until the day after procedure          No Alcohol   Order Comments: For 24 hours post procedure          Discharge Instructions - diet   Order Comments: Resume pre procedure diet.          NO driving or operating machinery    Order Comments: until the day after the procedure.          Do not immerse incision in water    Order Comments: until the sutures are removed.          Dressing   Order Comments: Keep dressing clean and dry.  Dressing / incision care as instructed by Provider and/or Nurse.          Ice to affected area   Order Comments: Ice to operative site, as needed.          Discharge Instructions   Order Comments: Follow up appointment as instructed by Provider and/or Nurse.       No future appointments.
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
## 2018-05-28 NOTE — PROGRESS NOTES
04/16/18 1445   General Information   Type Of Visit Initial   Start Of Care Date 04/16/18   Referring Physician Charbel Wise MD   Orders Evaluate And Treat   Orders Comment Clinical swallow study   Medical Diagnosis Dysphagia   Onset Of Illness/injury Or Date Of Surgery 03/15/18   Precautions/limitations (Pt currently in leg brace following knee injury)   Hearing WFL   Pertinent History of Current Problem/OT: Additional Occupational Profile Info Mr. Lamas presents wtih 1 month history of increased difficulty swallowing. He had a choking episode where a piece of power bar became lodged in his throat on the right side. Coughing and sips of liquid did not help to clear this feeling. Eventually his airway locked up and something came up with an abdominal thrust maneuver. He has been eating very slowly since that time. He participated in esophagram however had injured his knee on the way to that test. This was completed at [facility].    Respiratory Status Room air   Prior Level Of Function Swallowing   Prior Level Of Function Comment Mostly liquids over the past month   Patient Role/employment History Employed  ([employer]. )   Home/community Accessibility Comments (flowsheet Row) Currently pt requires assist due to knee injury. He was independent prior to knee injury   General Observations Pt highly pleasant and cooperative.    Patient/family Goals Pt would like to go back to eating/drinking all foods/liquids.    Clinical Swallow Evaluation   Oral Musculature generally intact   Structural Abnormalities none present   Dentition present and adequate   Mucosal Quality good   Mandibular Strength and Mobility intact   Oral Labial Strength and Mobility WFL   Lingual Strength and Mobility WFL   Velar Elevation intact   Buccal Strength and Mobility intact   Laryngeal Function Cough;Throat clear;Swallow;Voicing initiated   Swallow Eval   Feeding Assistance no assistance needed   Clinical Swallow  Eval: Thin Liquid Texture Trial   Mode of Presentation, Thin Liquids cup;self-fed   Volume of Liquid or Food Presented 6 oz via cup   Oral Phase of Swallow WFL   Pharyngeal Phase of Swallow intact   Diagnostic Statement No overt clinical s/sx of aspiration/penetration noted on thin liquid trials.    Clinical Swallow Eval: Puree Solid Texture Trial   Mode of Presentation, Puree spoon;self-fed   Volume of Puree Presented Tsp applesauce x 4   Oral Phase, Puree WFL   Pharyngeal Phase, Puree repeated swallows   Diagnostic Statement Pt demonstrated no overt clinical s/sx of aspiration/penetration on puree consistency trials. Pt demonstrated repeated and effortful swallow on each trial. Pt only took smaller size bites.    Clinical Swallow Eval: Solid Food Texture Trial   Mode of Presentation, Solid self-fed   Volume of Solid Food Presented raegan cracker x1 in many small bites.   Oral Phase, Solid WFL   Pharyngeal Phase, Solid repeated swallows  (effortful swallows)   Diagnostic Statement No overt clinical s/sx of aspiration/penetration noted on solid consistency trials. Pt demonstrated repeated and effortful swallow on each bite.   Swallow Compensations   Swallow Compensations No compensations were used   Swallow Eval: Clinical Impressions   Skilled Criteria for Therapy Intervention Other (see comments)  (Pt would benefit from further evaluation)   Dysphagia Outcome Severity Scale (KINJAL) Level 6 - KINJAL   Diet texture recommendations Regular diet;Thin liquids   Recommended Feeding/Eating Techniques alternate between small bites and sips of food/liquid;maintain upright posture during/after eating for 30 mins;small sips/bites   Demonstrates Need for Referral to Another Service (Video swallow study)   Predicted Duration of Therapy Intervention (days/wks) Evaluation only with further evaluation indicated   Anticipated Discharge Disposition home   Risks and Benefits of Treatment have been explained. Yes   Patient, family and/or  staff in agreement with Plan of Care Yes   Clinical Impression Comments Mr. Lamas demonstrates minimal oropharyngeal dysphagia as characterized by multiple effortful swallows on puree and cookie consistencies. No overt clinical s/sx of aspiration/penetration noted on any consistencies presented. Pt demonstrates very deliberate po intake with small bites/sips on thicker viscosities. Pt reports history of choking episode which has his more hesitant about eating. He demonstrated clear vocal quality throughout evaluation. He does report some hoarseness in his voice periodically. Recommend pt continue regular consistency diet with thin liquids at this time. Small bites/sips and slow rate. Pt currently scheduled to undergo GI procedures. Pt would also benefit from Video Swallow Study to further assess pharyngeal swallow function due to multiple swallows and use of effortful swallow when consuming thicker viscosities.    Total Session Time   Total Session Time 30   Total Evaluation Time 30     Thank you for the referral of Lj Lamas. If you have any questions about this report, please contact me using the information below.      Delphine Beth MS, CCC-SLP  Speech-Language Pathology  [facility] [place]  Departement of Otolaryngology/D&T - 4th floor  Pager: [phone]  Phone: [phone]  Email: [email or web address]

## 2018-06-30 ENCOUNTER — MYC MEDICAL ADVICE (OUTPATIENT)
Dept: FAMILY MEDICINE | Facility: CLINIC | Age: 38
End: 2018-06-30

## 2018-07-02 NOTE — TELEPHONE ENCOUNTER
Dr Kan,   [facility] and [facility]  Thanks,  Kristie SAN RN

## 2018-07-02 NOTE — TELEPHONE ENCOUNTER
He can schedule an appointment with me, same day slot is OK, if he is not having a lot of pain or vision disturbance it is not likely an emergency

## 2018-07-05 ENCOUNTER — OFFICE VISIT (OUTPATIENT)
Dept: FAMILY MEDICINE | Facility: CLINIC | Age: 38
End: 2018-07-05
Payer: COMMERCIAL

## 2018-07-05 VITALS
DIASTOLIC BLOOD PRESSURE: 66 MMHG | WEIGHT: 315 LBS | TEMPERATURE: 98.3 F | OXYGEN SATURATION: 98 % | BODY MASS INDEX: 45.1 KG/M2 | HEIGHT: 70 IN | HEART RATE: 76 BPM | SYSTOLIC BLOOD PRESSURE: 126 MMHG

## 2018-07-05 DIAGNOSIS — R51.9 NONINTRACTABLE HEADACHE, UNSPECIFIED CHRONICITY PATTERN, UNSPECIFIED HEADACHE TYPE: ICD-10-CM

## 2018-07-05 DIAGNOSIS — G50.0 TRIGEMINAL NEURALGIA: Primary | ICD-10-CM

## 2018-07-05 PROCEDURE — 99214 OFFICE O/P EST MOD 30 MIN: CPT | Performed by: INTERNAL MEDICINE

## 2018-07-05 RX ORDER — CARBAMAZEPINE 200 MG/1
200 TABLET, EXTENDED RELEASE ORAL 2 TIMES DAILY
Qty: 180 TABLET | Refills: 1 | Status: SHIPPED | OUTPATIENT
Start: 2018-07-05 | End: 2018-08-20

## 2018-07-05 NOTE — PROGRESS NOTES
SUBJECTIVE:   Lj Lamas is a 37 year old male who presents to clinic today for the following health issues:  Eye(s) Problem      Duration: since left knee surgery on 5-     Description:  Location: right  Pain: YES  Redness: no   Discharge: no     Accompanying signs and symptoms: pressure worsened since off pain meds    History (Trauma, foreign body exposure,): bumped head while in hospital early June 2018    Precipitating or alleviating factors (contact use): None    Therapies tried and outcome: None      Pleasant 37 year old who recently had knee surgery  He bumped his head on the hand bar on his orthopedic bed after surgery  He did not think much of it at the time because he was on a lot of pain medications  However, he notes that his pain is persistent and that injury occurred several weeks ago  He describes intermittent sometimes severe pain localized to behind his right eye in a V1, V2 distribution   No visual disturbance   No focal numbness or weakness  No photophobia or phonophobia       Problem list and histories reviewed & adjusted, as indicated.  Additional history: as documented    Patient Active Problem List   Diagnosis     Morbid obesity due to excess calories (H)     Venous (peripheral) insufficiency     Dysphagia, unspecified type     Status post anterior cruciate ligament surgery     Past Surgical History:   Procedure Laterality Date     ARTHROSCOPIC  RECONSTRUCTION/REPAIR LATERAL LIGAMENT KNEE Left 5/23/2018    Procedure: ARTHROSCOPIC  RECONSTRUCTION/REPAIR LATERAL LIGAMENT KNEE;;  Surgeon: Jarod Meyers MD;  [facility]     ARTHROSCOPIC RECONSTRUCTION ANTERIOR CRUCIATE LIGAMENT Left 5/23/2018    Procedure: ARTHROSCOPIC RECONSTRUCTION ANTERIOR CRUCIATE LIGAMENT;  Left Knee  Arthroscopic Anterior Cruciate Ligament Reconstruction with Hamstring Allograft, Fibular Colateral Ligament Reconstruction with Allograft, and Peroneal Nerve Neurolysis;  Surgeon: Jarod Meyers  "MD Charbel;  [facility]     ESOPHAGOSCOPY, GASTROSCOPY, DUODENOSCOPY (EGD), COMBINED N/A 3/28/2018    Procedure: COMBINED ESOPHAGOSCOPY, GASTROSCOPY, DUODENOSCOPY (EGD);  egd;  Surgeon: Charbel Wise MD;  [facility]     NO HISTORY OF SURGERY         Social History   Substance Use Topics     Smoking status: Never Smoker     Smokeless tobacco: Never Used      Comment: occass cigars in college     Alcohol use 0.0 oz/week     0 Standard drinks or equivalent per week      Comment: 1-2 drinks per month     Family History   Problem Relation Age of Onset     Diabetes Mother      HEART DISEASE Father      Diabetes Maternal Grandfather      Lung Cancer Maternal Grandfather      Bone Cancer Maternal Grandfather      Cancer Paternal Grandfather          Current Outpatient Prescriptions   Medication Sig Dispense Refill     acetaminophen (TYLENOL) 325 MG tablet Take 1,000 mg by mouth        carBAMazepine (TEGRETOL XR) 200 MG 12 hr tablet Take 1 tablet (200 mg) by mouth 2 times daily 180 tablet 1     ibuprofen (ADVIL/MOTRIN) 600 MG tablet Take 600 mg by mouth       LANsoprazole (PREVACID) 30 MG CR capsule Take 1 capsule (30 mg) by mouth 2 times daily Open capsule put in apple sauce 30 capsule 3     Loratadine (CLARITIN PO)        Not on File    Reviewed and updated as needed this visit by clinical staff       Reviewed and updated as needed this visit by Provider         ROS:  Knee pain is improving and he is off all opioids now   He denies new bowel or bladder symptoms     OBJECTIVE:     /66 (BP Location: Left arm, Cuff Size: Adult Large)  Pulse 76  Temp 98.3  F (36.8  C) (Oral)  Ht 5' 10\" (1.778 m)  Wt 322 lb 11.2 oz (146.4 kg)  SpO2 98%  BMI 46.3 kg/m2  Body mass index is 46.3 kg/(m^2).  GENERAL: healthy, alert and no distress  MS: Left knee is in an immobilizer brace   NEURO: Alert and oriented to person, place and time. Cranial nerves 2-12 appear grossly intact.   No pronator drift.  Symmetric " strength.  DTR's difficult to assess in lower extremities due to recent knee surgery.  DTRs at triceps, biceps and brachioradialis tendons 2+ bilaterally.  Normal sensation to light touch in bilateral face.     SKIN:  No rash or bruising on face   PSYCH: mentation appears normal, affect normal/bright    MRI of brain pending     ASSESSMENT/PLAN:       1. Trigeminal neuralgia  I actually think his symptoms are most consistent with TN  Trial of tegretol with short term follow up in 2-4 weeks to assess therapy and adjust dose if needed  - carBAMazepine (TEGRETOL XR) 200 MG 12 hr tablet; Take 1 tablet (200 mg) by mouth 2 times daily  Dispense: 180 tablet; Refill: 1    2. Nonintractable headache, unspecified chronicity pattern, unspecified headache type  To exclude mass lesion or demyelinating disease and because of history of head injury, check brain MRI   - MR Brain w/o & w Contrast; Future        José Kan MD  [facility]

## 2018-07-05 NOTE — MR AVS SNAPSHOT
After Visit Summary   7/5/2018    Lj Lamas    MRN: [id number]           Patient Information     Date Of Birth          1980        Visit Information        Provider Department      7/5/2018 4:00 PM José Kan MD [facility]        Today's Diagnoses     Trigeminal neuralgia    -  1    Nonintractable headache, unspecified chronicity pattern, unspecified headache type          Care Instructions    Please call [facility] radiology at [phone] to schedule your MRI test.            Follow-ups after your visit        Follow-up notes from your care team     Return in about 4 weeks (around 8/2/2018) for Routine Visit.      Your next 10 appointments already scheduled     Sep 05, 2018   Procedure with Charbel Wise MD   [facility], [place], Endoscopy ([facility], [facility])    [address] [place] [address] [phone]           The [facility] is located on the corner of [address] and [address] on the [facility]. It is easily accessible from virtually any point in the [place] area, via [employer] and [employer].              Future tests that were ordered for you today     Open Future Orders        Priority Expected Expires Ordered    MR Brain w/o & w Contrast Routine  7/5/2019 7/5/2018            Who to contact     If you have questions or need follow up information about today's clinic visit or your schedule please contact [facility] [place] directly at [phone].  Normal or non-critical lab and imaging results will be communicated to you by MyChart, letter or phone within 4 business days after the clinic has received the results. If you do not hear from us within 7 days, please contact the clinic through MyChart or phone. If you have a critical or abnormal lab result, we will notify you by phone as soon as possible.  Submit refill requests through [employer] or call your pharmacy and they  "will forward the refill request to us. Please allow 3 business days for your refill to be completed.          Additional Information About Your Visit        [employer] Information     [employer] gives you secure access to your electronic health record. If you see a primary care provider, you can also send messages to your care team and make appointments. If you have questions, please call your primary care clinic.  If you do not have a primary care provider, please call [phone] and they will assist you.        Care EveryWhere ID     This is your Care EveryWhere ID. This could be used by other organizations to access your [employer] medical records  [id number]        Your Vitals Were     Pulse Temperature Height Pulse Oximetry BMI (Body Mass Index)       76 98.3  F (36.8  C) (Oral) 5' 10\" (1.778 m) 98% 46.3 kg/m2        Blood Pressure from Last 3 Encounters:   07/05/18 126/66   05/25/18 110/75   05/24/18 104/57    Weight from Last 3 Encounters:   07/05/18 322 lb 11.2 oz (146.4 kg)   05/23/18 312 lb 2.7 oz (141.6 kg)   05/14/18 (!) 334 lb (151.5 kg)                 Today's Medication Changes          These changes are accurate as of 7/5/18  4:30 PM.  If you have any questions, ask your nurse or doctor.               Start taking these medicines.        Dose/Directions    carBAMazepine 200 MG 12 hr tablet   Commonly known as:  TEGretol XR   Used for:  Trigeminal neuralgia   Started by:  José Kan MD        Dose:  200 mg   Take 1 tablet (200 mg) by mouth 2 times daily   Quantity:  180 tablet   Refills:  1            Where to get your medicines      These medications were sent to [id number] - [place] - [address]  [address] [place] [address]     Phone:  [phone]     carBAMazepine 200 MG 12 hr tablet                Primary Care Provider Office Phone # Fax #    José Kan [phone] [address]  [place] " [place] [address]        Equal Access to Services     [facility] Center[facility]YNN : Hadii aad ku hadjosé antoniosukhwinder Rosyali, waleonda tuliopatha, qakatalinata kaharrissean gaviria, larry nam. So [facility] [phone].    ATENCIÓN: Si habla español, tiene a de leon disposición servicios gratuitos de asistencia lingüística. Llame al [phone].    We comply with applicable federal civil rights laws and [place] laws. We do not discriminate on the basis of race, color, national origin, age, disability, sex, sexual orientation, or gender identity.            Thank you!     Thank you for choosing [facility]  for your care. Our goal is always to provide you with excellent care. Hearing back from our patients is one way we can continue to improve our services. Please take a few minutes to complete the written survey that you may receive in the mail after your visit with us. Thank you!             Your Updated Medication List - Protect others around you: Learn how to safely use, store and throw away your medicines at www.disposemymeds.org.          This list is accurate as of 7/5/18  4:30 PM.  Always use your most recent med list.                   Brand Name Dispense Instructions for use Diagnosis    acetaminophen 325 MG tablet    TYLENOL     Take 1,000 mg by mouth        carBAMazepine 200 MG 12 hr tablet    TEGretol XR    180 tablet    Take 1 tablet (200 mg) by mouth 2 times daily    Trigeminal neuralgia       CLARITIN PO           ibuprofen 600 MG tablet    ADVIL/MOTRIN     Take 600 mg by mouth        LANsoprazole 30 MG CR capsule    PREVACID    30 capsule    Take 1 capsule (30 mg) by mouth 2 times daily Open capsule put in apple sauce    Breen's esophagus

## 2018-07-09 ENCOUNTER — HOSPITAL ENCOUNTER (OUTPATIENT)
Dept: MRI IMAGING | Facility: CLINIC | Age: 38
Discharge: HOME | End: 2018-07-09
Attending: INTERNAL MEDICINE | Admitting: INTERNAL MEDICINE
Payer: COMMERCIAL

## 2018-07-09 DIAGNOSIS — R51.9 NONINTRACTABLE HEADACHE, UNSPECIFIED CHRONICITY PATTERN, UNSPECIFIED HEADACHE TYPE: ICD-10-CM

## 2018-07-09 PROCEDURE — 25000128 H RX IP 250 OP 636: Performed by: INTERNAL MEDICINE

## 2018-07-09 PROCEDURE — A9585 GADOBUTROL INJECTION: HCPCS | Performed by: INTERNAL MEDICINE

## 2018-07-09 PROCEDURE — 70553 MRI BRAIN STEM W/O & W/DYE: CPT

## 2018-07-09 RX ORDER — GADOBUTROL 604.72 MG/ML
14 INJECTION INTRAVENOUS ONCE
Status: COMPLETED | OUTPATIENT
Start: 2018-07-09 | End: 2018-07-09

## 2018-07-09 RX ADMIN — GADOBUTROL 14 ML: 604.72 INJECTION INTRAVENOUS at 17:29

## 2018-07-10 NOTE — PROGRESS NOTES
The following letter pertains to your most recent diagnostic tests:    Good news! Your brain MRI does NOT show any dangerous causes for your head pain.   I suspect your symptoms are from trigeminal neuralgia as we discussed in clinic.  Follow up with me as previously directed to access therapy with carbamazepine.      Sincerely,    Dr. Kan

## 2018-07-12 ENCOUNTER — CARE COORDINATION (OUTPATIENT)
Dept: GASTROENTEROLOGY | Facility: CLINIC | Age: 38
End: 2018-07-12

## 2018-07-13 ENCOUNTER — CARE COORDINATION (OUTPATIENT)
Dept: GASTROENTEROLOGY | Facility: CLINIC | Age: 38
End: 2018-07-13

## 2018-07-19 ENCOUNTER — OFFICE VISIT (OUTPATIENT)
Dept: FAMILY MEDICINE | Facility: CLINIC | Age: 38
End: 2018-07-19
Payer: COMMERCIAL

## 2018-07-19 VITALS
TEMPERATURE: 97.4 F | WEIGHT: 315 LBS | SYSTOLIC BLOOD PRESSURE: 123 MMHG | HEART RATE: 74 BPM | DIASTOLIC BLOOD PRESSURE: 72 MMHG | HEIGHT: 70 IN | OXYGEN SATURATION: 97 % | BODY MASS INDEX: 45.1 KG/M2

## 2018-07-19 DIAGNOSIS — G50.0 TRIGEMINAL NEURALGIA: Primary | ICD-10-CM

## 2018-07-19 PROCEDURE — 99213 OFFICE O/P EST LOW 20 MIN: CPT | Performed by: INTERNAL MEDICINE

## 2018-07-19 NOTE — PROGRESS NOTES
SUBJECTIVE:   Lj Lamas is a 37 year old male who presents to clinic today for the following health issues:      F/up MRI 7-9-2018    Recheck meds    Tegretol has helped right face pain from 9-10/10 to at most 5/10 intensity  He thinks it is helping a lot   No side effects from the tegretol   Knee pain and function continues to improve       Problem list and histories reviewed & adjusted, as indicated.  Additional history: as documented    Patient Active Problem List   Diagnosis     Morbid obesity due to excess calories (H)     Venous (peripheral) insufficiency     Dysphagia, unspecified type     Status post anterior cruciate ligament surgery     Trigeminal neuralgia     Past Surgical History:   Procedure Laterality Date     ARTHROSCOPIC  RECONSTRUCTION/REPAIR LATERAL LIGAMENT KNEE Left 5/23/2018    Procedure: ARTHROSCOPIC  RECONSTRUCTION/REPAIR LATERAL LIGAMENT KNEE;;  Surgeon: Jarod Meyers MD;  [facility]     ARTHROSCOPIC RECONSTRUCTION ANTERIOR CRUCIATE LIGAMENT Left 5/23/2018    Procedure: ARTHROSCOPIC RECONSTRUCTION ANTERIOR CRUCIATE LIGAMENT;  Left Knee  Arthroscopic Anterior Cruciate Ligament Reconstruction with Hamstring Allograft, Fibular Colateral Ligament Reconstruction with Allograft, and Peroneal Nerve Neurolysis;  Surgeon: Jarod Meyers MD;  [facility]     ESOPHAGOSCOPY, GASTROSCOPY, DUODENOSCOPY (EGD), COMBINED N/A 3/28/2018    Procedure: COMBINED ESOPHAGOSCOPY, GASTROSCOPY, DUODENOSCOPY (EGD);  egd;  Surgeon: Charbel Wise MD;  [facility]     NO HISTORY OF SURGERY         Social History   Substance Use Topics     Smoking status: Never Smoker     Smokeless tobacco: Never Used      Comment: occass cigars in college     Alcohol use 0.0 oz/week     0 Standard drinks or equivalent per week      Comment: 1-2 drinks per month     Family History   Problem Relation Age of Onset     Diabetes Mother      HEART DISEASE Father      Diabetes Maternal Grandfather  "     Lung Cancer Maternal Grandfather      Bone Cancer Maternal Grandfather      Cancer Paternal Grandfather          Current Outpatient Prescriptions   Medication Sig Dispense Refill     carBAMazepine (TEGRETOL XR) 200 MG 12 hr tablet Take 1 tablet (200 mg) by mouth 2 times daily 180 tablet 1     ibuprofen (ADVIL/MOTRIN) 600 MG tablet Take 600 mg by mouth       LANsoprazole (PREVACID) 30 MG CR capsule Take 1 capsule (30 mg) by mouth 2 times daily Open capsule put in apple sauce 30 capsule 3     acetaminophen (TYLENOL) 325 MG tablet Take 1,000 mg by mouth        Loratadine (CLARITIN PO) Take by mouth daily        No Known Allergies    Reviewed and updated as needed this visit by clinical staff       Reviewed and updated as needed this visit by Provider         ROS:      OBJECTIVE:     /72 (BP Location: Right arm, Cuff Size: Adult Large)  Pulse 74  Temp 97.4  F (36.3  C) (Oral)  Ht 5' 10\" (1.778 m)  Wt 330 lb 9.6 oz (150 kg)  SpO2 97%  BMI 47.44 kg/m2  Body mass index is 47.44 kg/(m^2).  GENERAL: healthy, alert and no distress  MS: left knee is in brace     Diagnostic Test Results:  Results for orders placed or performed during the hospital encounter of 07/09/18   MR Brain w/o & w Contrast    Narrative    MRI OF THE BRAIN WITHOUT AND WITH CONTRAST 7/9/2018 5:29 PM     COMPARISON: Head CT 11/5/2008    HISTORY:  right temporal headache. Nonintractable headache,  unspecified chronicity pattern, unspecified headache type.      TECHNIQUE: Multi-sequence, multi-planar MRI images of the brain were  acquired before and after the administration of IV gadolinium (14mL  Gadavist).    FINDINGS: The ventricles and basal cisterns are normal in  configuration. There is no midline shift. There are no extra-axial  fluid collections. Gray-white differentiation is well maintained.  There is no evidence for stroke or acute intracranial hemorrhage.  There is no abnormal contrast enhancement in the brain or " its  coverings.    There is no sinusitis or mastoiditis.      Impression    IMPRESSION: Normal brain MRI.       MILI LUNA MD       ASSESSMENT/PLAN:       1. Trigeminal neuralgia  Positive response to tegretol makes TN the likely diagnosis for his facial/head pain  I told him he could try increasing the dose to up to 400mg BID  If he gets too tired with that dose, then he can go back to 200mg BID  [facility] an [facility] 400mg [facility]  Otherwise, we will plan to have him returnin 3 months to reassess need for tegretol and dose  He will contact me sooner if symptoms worsen in which case a referral to neurology / neurosurgery may be needed  Total face to face contact time was greater than 15 minutes of which more than 50% of this time was spent counseling and coordinating care regarding the above topics.            José Kan MD  [facility]

## 2018-08-09 ENCOUNTER — TELEPHONE (OUTPATIENT)
Dept: GASTROENTEROLOGY | Facility: CLINIC | Age: 38
End: 2018-08-09

## 2018-08-13 ENCOUNTER — TELEPHONE (OUTPATIENT)
Dept: GASTROENTEROLOGY | Facility: CLINIC | Age: 38
End: 2018-08-13

## 2018-08-15 ENCOUNTER — SURGERY (OUTPATIENT)
Age: 38
End: 2018-08-15

## 2018-08-15 ENCOUNTER — HOSPITAL ENCOUNTER (OUTPATIENT)
Facility: CLINIC | Age: 38
Discharge: HOME | End: 2018-08-15
Attending: INTERNAL MEDICINE | Admitting: INTERNAL MEDICINE
Payer: COMMERCIAL

## 2018-08-15 VITALS
OXYGEN SATURATION: 97 % | DIASTOLIC BLOOD PRESSURE: 65 MMHG | RESPIRATION RATE: 9 BRPM | SYSTOLIC BLOOD PRESSURE: 82 MMHG

## 2018-08-15 LAB — UPPER GI ENDOSCOPY: NORMAL

## 2018-08-15 PROCEDURE — 88305 TISSUE EXAM BY PATHOLOGIST: CPT | Performed by: INTERNAL MEDICINE

## 2018-08-15 PROCEDURE — 99152 MOD SED SAME PHYS/QHP 5/>YRS: CPT | Performed by: INTERNAL MEDICINE

## 2018-08-15 PROCEDURE — 25000128 H RX IP 250 OP 636: Performed by: INTERNAL MEDICINE

## 2018-08-15 PROCEDURE — 25000132 ZZH RX MED GY IP 250 OP 250 PS 637: Performed by: INTERNAL MEDICINE

## 2018-08-15 PROCEDURE — 43239 EGD BIOPSY SINGLE/MULTIPLE: CPT | Performed by: INTERNAL MEDICINE

## 2018-08-15 PROCEDURE — G0500 MOD SEDAT ENDO SERVICE >5YRS: HCPCS | Performed by: INTERNAL MEDICINE

## 2018-08-15 PROCEDURE — 99153 MOD SED SAME PHYS/QHP EA: CPT | Performed by: INTERNAL MEDICINE

## 2018-08-15 PROCEDURE — 25000125 ZZHC RX 250: Performed by: INTERNAL MEDICINE

## 2018-08-15 RX ORDER — ONDANSETRON 2 MG/ML
4 INJECTION INTRAMUSCULAR; INTRAVENOUS EVERY 6 HOURS PRN
Status: DISCONTINUED | OUTPATIENT
Start: 2018-08-15 | End: 2018-08-20 | Stop reason: HOSPADM

## 2018-08-15 RX ORDER — LIDOCAINE 40 MG/G
CREAM TOPICAL
Status: DISCONTINUED | OUTPATIENT
Start: 2018-08-15 | End: 2018-08-20 | Stop reason: HOSPADM

## 2018-08-15 RX ORDER — ONDANSETRON 2 MG/ML
4 INJECTION INTRAMUSCULAR; INTRAVENOUS
Status: DISCONTINUED | OUTPATIENT
Start: 2018-08-15 | End: 2018-08-20 | Stop reason: HOSPADM

## 2018-08-15 RX ORDER — NALOXONE HYDROCHLORIDE 0.4 MG/ML
.1-.4 INJECTION, SOLUTION INTRAMUSCULAR; INTRAVENOUS; SUBCUTANEOUS
Status: ACTIVE | OUTPATIENT
Start: 2018-08-15 | End: 2018-08-16

## 2018-08-15 RX ORDER — FENTANYL CITRATE 50 UG/ML
INJECTION, SOLUTION INTRAMUSCULAR; INTRAVENOUS PRN
Status: DISCONTINUED | OUTPATIENT
Start: 2018-08-15 | End: 2018-08-20 | Stop reason: HOSPADM

## 2018-08-15 RX ORDER — SIMETHICONE
LIQUID (ML) MISCELLANEOUS PRN
Status: DISCONTINUED | OUTPATIENT
Start: 2018-08-15 | End: 2018-08-20 | Stop reason: HOSPADM

## 2018-08-15 RX ORDER — ONDANSETRON 4 MG/1
4 TABLET, ORALLY DISINTEGRATING ORAL EVERY 6 HOURS PRN
Status: DISCONTINUED | OUTPATIENT
Start: 2018-08-15 | End: 2018-08-20 | Stop reason: HOSPADM

## 2018-08-15 RX ORDER — FLUMAZENIL 0.1 MG/ML
0.2 INJECTION, SOLUTION INTRAVENOUS
Status: ACTIVE | OUTPATIENT
Start: 2018-08-15 | End: 2018-08-15

## 2018-08-15 RX ADMIN — MIDAZOLAM 1 MG: 1 INJECTION INTRAMUSCULAR; INTRAVENOUS at 09:28

## 2018-08-15 RX ADMIN — FENTANYL CITRATE 50 MCG: 50 INJECTION, SOLUTION INTRAMUSCULAR; INTRAVENOUS at 09:28

## 2018-08-15 RX ADMIN — FENTANYL CITRATE 50 MCG: 50 INJECTION, SOLUTION INTRAMUSCULAR; INTRAVENOUS at 09:40

## 2018-08-15 RX ADMIN — MIDAZOLAM 2 MG: 1 INJECTION INTRAMUSCULAR; INTRAVENOUS at 09:25

## 2018-08-15 RX ADMIN — FENTANYL CITRATE 50 MCG: 50 INJECTION, SOLUTION INTRAMUSCULAR; INTRAVENOUS at 09:24

## 2018-08-15 RX ADMIN — TOPICAL ANESTHETIC 1 SPRAY: 200 SPRAY DENTAL; PERIODONTAL at 09:24

## 2018-08-15 RX ADMIN — Medication 2 ML: at 08:29

## 2018-08-15 RX ADMIN — FENTANYL CITRATE 50 MCG: 50 INJECTION, SOLUTION INTRAMUSCULAR; INTRAVENOUS at 09:32

## 2018-08-15 NOTE — IP AVS SNAPSHOT
MRN:[id number]                      After Visit Summary   8/15/2018    Lj Lamas    MRN: [id number]           Thank you!     Thank you for choosing [facility] for your care. Our goal is always to provide you with excellent care. Hearing back from our patients is one way we can continue to improve our services. Please take a few minutes to complete the written survey that you may receive in the mail after you visit with us. Thank you!        Patient Information     Date Of Birth          1980        About your hospital stay     You were admitted on:  August 15, 2018 You last received care in the:  [facility], Endoscopy    You were discharged on:  August 15, 2018       Who to Call     For medical emergencies, please call 911.  For non-urgent questions about your medical care, please call your primary care provider or clinic, [phone]  For questions related to your surgery, please call your surgery clinic        Attending Provider     Provider Charbel Barroso MD Gastroenterology       Primary Care Provider Office Phone # Fax #    José CARTER Kan [phone]      Further instructions from your care team       Discharge Instructions after  Upper Endoscopy (EGD)    Activity and Diet  You were given medicine for pain. You may be dizzy or sleepy.  For 24 hours:    Do not drive or use heavy equipment.    Do not make important decisions.    Do not drink any alcohol.  __x_ You may return to your regular diet.    Discomfort  You may have a sore throat for 2 to 3 days. It may help to:    Avoid hot liquids for 24 hours.    Use sore throat lozenges.    Gargle as needed with salt water up to 4 times a day. Mix 1 cup of warm water  with 1 teaspoon of salt. Do not swallow.    You may take Tylenol (acetaminophen) for pain unless your doctor has told you not to.    Do not take aspirin or ibuprofen (Advil, Motrin) or other NSAIDS  (anti-inflammatory drugs) for  _3__ days.    Follow-up  __x_ We took small tissue samples for study. If you do not have a follow-up visit scheduled,  call your provider s office in 2 weeks for the results.    When to call us:  Problems are rare. Call right away if you have:    Unusual throat pain or trouble swallowing    Unusual pain in belly or chest that is not relieved by belching or passing air    Black stools (tar-like looking bowel movement)    Temperature above 100.6  F. (37.5  C).    If you vomit blood or have severe pain, go to an emergency room.    If you have questions, call:  Monday to Friday, 7 a.m. to 4:30 p.m.: Endoscopy: [phone] (We may have to call you back)    After hours: Hospital: [phone] (Ask for the GI fellow on call)    Pending Results     Date and Time Order Name Status Description    8/15/2018 0951 Surgical pathology exam In process             Admission Information     Date & Time Provider Department Dept. Phone    8/15/2018 Charbel Wise MD [facility], [place], Endoscopy [phone]      Your Vitals Were     Blood Pressure Respirations Pulse Oximetry             82/65 9 97%         MyChart Information     [facility] gives you secure access to your electronic health record. If you see a primary care provider, you can also send messages to your care team and make appointments. If you have questions, please call your primary care clinic.  If you do not have a primary care provider, please call [phone] and they will assist you.        Care EveryWhere ID     This is your Care EveryWhere ID. This could be used by other organizations to access your [place] medical records  [id number]        Equal Access to Services     [facility] AGUILA : Hadii tacos denny hadasho Soangelitoali, waaxda luqadaha, qaybta kaalmada everette, larry nam. So [facility] [phone].    ATENCIÓN: Si habla español, tiene a de leon disposición servicios gratuitos de asistencia lingüística. Llame al [phone].    We comply  with applicable federal civil rights laws and [place] laws. We do not discriminate on the basis of race, color, national origin, age, disability, sex, sexual orientation, or gender identity.               Review of your medicines      CONTINUE these medicines which have NOT CHANGED        Dose / Directions    acetaminophen 325 MG tablet   Commonly known as:  TYLENOL        Dose:  1000 mg   Take 1,000 mg by mouth   Refills:  0       carBAMazepine 200 MG 12 hr tablet   Commonly known as:  TEGretol XR   Used for:  Trigeminal neuralgia        Dose:  200 mg   Take 1 tablet (200 mg) by mouth 2 times daily   Quantity:  180 tablet   Refills:  1       CLARITIN PO        Take by mouth daily   Refills:  0       ibuprofen 600 MG tablet   Commonly known as:  ADVIL/MOTRIN        Dose:  600 mg   Take 600 mg by mouth   Refills:  0       LANsoprazole 30 MG CR capsule   Commonly known as:  PREVACID   Used for:  Breen's esophagus        Dose:  30 mg   Take 1 capsule (30 mg) by mouth 2 times daily Open capsule put in apple sauce   Quantity:  30 capsule   Refills:  3                Protect others around you: Learn how to safely use, store and throw away your medicines at www.disposemymeds.org.             Medication List: This is a list of all your medications and when to take them. Check marks below indicate your daily home schedule. Keep this list as a reference.      Medications           Morning Afternoon Evening Bedtime As Needed    acetaminophen 325 MG tablet   Commonly known as:  TYLENOL   Take 1,000 mg by mouth                                carBAMazepine 200 MG 12 hr tablet   Commonly known as:  TEGretol XR   Take 1 tablet (200 mg) by mouth 2 times daily                                CLARITIN PO   Take by mouth daily                                ibuprofen 600 MG tablet   Commonly known as:  ADVIL/MOTRIN   Take 600 mg by mouth                                LANsoprazole 30 MG CR capsule   Commonly known as:  PREVACID    Take 1 capsule (30 mg) by mouth 2 times daily Open capsule put in apple sauce

## 2018-08-15 NOTE — IP AVS SNAPSHOT
[facility], [place], Endoscopy    [address] [address]    Phone:  [phone]                                       After Visit Summary   8/15/2018    Lj Lamas    MRN: [id number]           After Visit Summary Signature Page     I have received my discharge instructions, and my questions have been answered. I have discussed any challenges I see with this plan with the nurse or doctor.    ..........................................................................................................................................  Patient/Patient Representative Signature      ..........................................................................................................................................  Patient Representative Print Name and Relationship to Patient    ..................................................               ................................................  Date                                            Time    ..........................................................................................................................................  Reviewed by Signature/Title    ...................................................              ..............................................  Date                                                            Time

## 2018-08-15 NOTE — DISCHARGE INSTRUCTIONS
Discharge Instructions after  Upper Endoscopy (EGD)    Activity and Diet  You were given medicine for pain. You may be dizzy or sleepy.  For 24 hours:    Do not drive or use heavy equipment.    Do not make important decisions.    Do not drink any alcohol.  __x_ You may return to your regular diet.    Discomfort  You may have a sore throat for 2 to 3 days. It may help to:    Avoid hot liquids for 24 hours.    Use sore throat lozenges.    Gargle as needed with salt water up to 4 times a day. Mix 1 cup of warm water  with 1 teaspoon of salt. Do not swallow.    You may take Tylenol (acetaminophen) for pain unless your doctor has told you not to.    Do not take aspirin or ibuprofen (Advil, Motrin) or other NSAIDS  (anti-inflammatory drugs) for _3__ days.    Follow-up  __x_ We took small tissue samples for study. If you do not have a follow-up visit scheduled,  call your provider s office in 2 weeks for the results.    When to call us:  Problems are rare. Call right away if you have:    Unusual throat pain or trouble swallowing    Unusual pain in belly or chest that is not relieved by belching or passing air    Black stools (tar-like looking bowel movement)    Temperature above 100.6  F. (37.5  C).    If you vomit blood or have severe pain, go to an emergency room.    If you have questions, call:  Monday to Friday, 7 a.m. to 4:30 p.m.: Endoscopy: [phone] (We may have to call you back)    After hours: Hospital: [phone] (Ask for the GI fellow on call)

## 2018-08-16 ENCOUNTER — TELEPHONE (OUTPATIENT)
Dept: GASTROENTEROLOGY | Facility: CLINIC | Age: 38
End: 2018-08-16

## 2018-08-16 DIAGNOSIS — K22.70 BARRETT'S ESOPHAGUS WITHOUT DYSPLASIA: Primary | ICD-10-CM

## 2018-08-16 LAB — COPATH REPORT: NORMAL

## 2018-09-25 DIAGNOSIS — R13.19 ESOPHAGEAL DYSPHAGIA: ICD-10-CM

## 2018-09-27 RX ORDER — LANSOPRAZOLE 30 MG/1
CAPSULE, DELAYED RELEASE ORAL
Qty: 60 CAPSULE | Refills: 0 | Status: SHIPPED | OUTPATIENT
Start: 2018-09-27 | End: 2018-11-03

## 2018-11-03 DIAGNOSIS — R13.19 ESOPHAGEAL DYSPHAGIA: ICD-10-CM

## 2018-11-08 NOTE — TELEPHONE ENCOUNTER
[facility] Call Center    Phone Message    May a detailed message be left on voicemail: yes    Reason for Call: Other: Rena calling on status of refill request     Action Taken: Message routed to:  [facility] Shannon Stanton

## 2018-11-13 RX ORDER — LANSOPRAZOLE 30 MG/1
CAPSULE, DELAYED RELEASE ORAL
Qty: 60 CAPSULE | Refills: 11 | Status: SHIPPED | OUTPATIENT
Start: 2018-11-13 | End: 2020-07-22

## 2019-03-11 DIAGNOSIS — G50.0 TRIGEMINAL NEURALGIA: ICD-10-CM

## 2019-03-12 NOTE — TELEPHONE ENCOUNTER
"DIFFERNECE IN STRENGTH  of tegretol      Requesting 200 mg  1 tab twice dialy    We last filled 400 mg 1 tab twice daily     carBAMazepine (TEGRETOL XR) 400 MG 12 hr tablet 180 tablet 2 8/20/2018  No   Sig - Route: Take 1 tablet (400 mg) by mouth 2 times daily        Last Written Prescription Date:  08/20/2018  Last Fill Quantity: 180,  # refills: 2   Last office visit: 7/19/2018 with prescribing provider:     Future Office Visit:      Requested Prescriptions   Pending Prescriptions Disp Refills     carBAMazepine (TEGRETOL XR) 200 MG 12 hr tablet [Pharmacy Med Name: CARBAMAZEPINE ER 200MG TABLETS] 180 tablet 0     Sig: TAKE 1 TABLET(200 MG) BY MOUTH TWICE DAILY    Anti-Seizure Meds Protocol  Failed - 3/11/2019  6:27 PM       Failed - Review Authorizing provider's last note.     Refer to last progress notes: confirm request is for original authorizing provider (cannot be through other providers).         Failed - Normal ALT or AST on file in past 26 months    No lab results found.  No lab results found.         Failed - Carbamazepine level within therapeutic range in last 26 months    No lab results found.    Carbamazepine level must be checked 2-4 weeks after dosage change.           Passed - Recent (12 mo) or future (30 days) visit within the authorizing provider's specialty    Patient had office visit in the last 12 months or has a visit in the next 30 days with authorizing provider or within the authorizing provider's specialty.  See \"Patient Info\" tab in inbasket, or \"Choose Columns\" in Meds & Orders section of the refill encounter.             Passed - Normal CBC on file in past 26 months    Recent Labs   Lab Test 05/14/18  1707   WBC 8.0   RBC 5.30   HGB 15.9   HCT 48.6                   Passed - Normal platelet count on file in past 26 months    Recent Labs   Lab Test 05/14/18  1707                 Passed - Medication is active on med list            "

## 2019-03-13 RX ORDER — CARBAMAZEPINE 200 MG/1
TABLET, EXTENDED RELEASE ORAL
Qty: 180 TABLET | Refills: 0 | Status: SHIPPED | OUTPATIENT
Start: 2019-03-13 | End: 2020-07-22

## 2019-03-13 NOTE — TELEPHONE ENCOUNTER
Routing refill request to provider for review/approval because:  Per LOV note on 7/19/2019: I told him he could try increasing the dose to up to 400mg BID  If he gets too tired with that dose, then he can go back to 200mg BID  He will contact me by Jonathan if he needs an prescription for 400mg tablets  Otherwise, we will plan to have him returnin 3 months to reassess need for tegretol and dose    Dose increased on 8/20/2018-see my chart.  Patient has not followed up.    AWA CatesN, RN  [employer] Triage

## 2019-05-08 ENCOUNTER — TELEPHONE (OUTPATIENT)
Dept: FAMILY MEDICINE | Facility: CLINIC | Age: 39
End: 2019-05-08

## 2019-05-08 NOTE — TELEPHONE ENCOUNTER
[employer] appointment request routed to triage:       [employer] message sent to patient to further triage symptoms:   Hi Lj,   Which wrist are you having problems with?   When did you first notice pain?   Can you describe the pain (e.g. Sharp/stabbing, pins and needles, shooting, dull-ache)?   Are you able to fully move your wrist, or do you have a decreased range of motion?   Any discoloration in the wrist or hand, or any numbness/tingling?  Any swelling?   On a scale of 0-10, where would you rate the pain?    Chioma COCHRAN RN      ===View-only below this line===      ----- Message -----     From: Lj Lamas     Sent: 5/7/2019  7:15 AM CDT       To: Patient Appointment Schedule Request Mailing List  Subject: Appointment Request    Appointment Request From: Lj Lamas    With Provider: José Kan MD [[facility]]    Preferred Date Range: 5/7/2019 - 5/8/2019    Preferred Times: Any time    Reason for visit: Request an Appointment    Comments:  Something happened to my wrist as I was sleeping, and there s a lot of movement in there. Didn t hurt enough for the ER, so wondering if I should be seen or go to urgent care.

## 2019-05-09 NOTE — TELEPHONE ENCOUNTER
Left non detailed message on pt's voicemail to call back to clinic, can schedule appt or [facility], pt's preference based on sx.  Jada Glover RN

## 2019-05-14 NOTE — TELEPHONE ENCOUNTER
Returned call to patient   Went on trip to [place]  Back in cities now     2 thursdays ago - woke up with acute pain in wrist, no idea what caused it (maybe slept on wrist?) - right side     Tried waiting it out but then wondering if sprained risk     Injured a long time ago     Pain persists, okay with wrapping but feels like something might be out of joint/movement with wrist    Pulling thumb out causes pain    Advised OV (, team, or [facility] walk in ) for ongoing wrist pain. Pt declined to schedule, states he will probably go to      Chioma COCHRAN RN

## 2019-11-05 ENCOUNTER — HEALTH MAINTENANCE LETTER (OUTPATIENT)
Age: 39
End: 2019-11-05

## 2020-01-17 DIAGNOSIS — K22.70 BARRETT'S ESOPHAGUS: ICD-10-CM

## 2020-01-17 DIAGNOSIS — K22.70 BARRETT'S ESOPHAGUS WITHOUT DYSPLASIA: Primary | ICD-10-CM

## 2020-01-17 RX ORDER — LANSOPRAZOLE 30 MG/1
30 CAPSULE, DELAYED RELEASE ORAL 2 TIMES DAILY
Qty: 30 CAPSULE | Refills: 3 | Status: SHIPPED | OUTPATIENT
Start: 2020-01-17 | End: 2020-05-21

## 2020-01-20 ENCOUNTER — CARE COORDINATION (OUTPATIENT)
Dept: GASTROENTEROLOGY | Facility: CLINIC | Age: 40
End: 2020-01-20

## 2020-01-20 NOTE — PROGRESS NOTES
"Care Coordination Telephone Call  [facility]     Received all from [place], pharmacist at [facility] to discuss quantity prescribed by Dr. Wise.     They would like to have #30 changed to #60 due to the dosage.    After they have that change they will need to proceed with prior authorization for dosage.    Will send message to Dr. Wise to review and update prescription.    Per Dr. Wise:  Last note from me stated that he would reduce to once a day. If not tolerating, then return to twice a day.     Please clarify if he had recurrent reflux symptoms on once daily dosing. If so, he should be on twice a day. Then indication for prior auth would be documented long-segment Breen's esophagus without dysplasia and uncontrolled reflux on once daily dosing.     1/21/20 message left for patient to call me to clarify above.  1/22/20 patient returned my call.  He states that he normally takes only one PPI per day but \"if I have no control over what is served I may need to take two.\"  He asked for increase in # of pills prescribed for PPI and can decrease prescription to once per day.    1/23/20 discussed with Dr. Wise - prescription changed and called to [facility] pharmacy for one 30 mg Prevacid per day #90 with 3 refills.  Patient has been informed via his voice mail that Dr. Wise is prescribing as refilled.    Concepcion Ascencio  BSN, HNBC, STAR-T  RN Care Coordinator  [facility]  Ph: [phone]  FAX: [phone]          "

## 2020-02-12 NOTE — TELEPHONE ENCOUNTER
See path letter to pt.    Please arrange for repeat EGD with me in GI lab in 3 years for follow-up of Breen's.    Order placed.    RONAK Wise MD  Associate Professor of Medicine  Division of Gastroenterology, Hepatology and Nutrition  [facility]    
stretcher

## 2020-05-21 DIAGNOSIS — K22.70 BARRETT'S ESOPHAGUS WITHOUT DYSPLASIA: ICD-10-CM

## 2020-05-21 RX ORDER — LANSOPRAZOLE 30 MG/1
30 CAPSULE, DELAYED RELEASE ORAL 2 TIMES DAILY
Qty: 30 CAPSULE | Refills: 3 | Status: SHIPPED | OUTPATIENT
Start: 2020-05-21 | End: 2020-07-22

## 2020-05-22 ENCOUNTER — TELEPHONE (OUTPATIENT)
Dept: ONCOLOGY | Facility: CLINIC | Age: 40
End: 2020-05-22

## 2020-05-22 NOTE — TELEPHONE ENCOUNTER
Central Prior Authorization Team   Phone: [phone]      PA Initiation    Medication: Lansoprazole 30mg DR Capsules-PA initiated  Insurance Company: [facility] - Phone [phone] Fax [phone]  Pharmacy Filling the Rx: [id number] - [place], [place] - [address] [facility]  Filling Pharmacy Phone: [phone]  Filling Pharmacy Fax:    Start Date: 5/22/2020

## 2020-05-22 NOTE — TELEPHONE ENCOUNTER
Prior Authorization Retail Medication Request    Medication/Dose: Lansoprazole 30mg DR Capsules  ICD code (if different than what is on RX):    Previously Tried and Failed:    Rationale:      Insurance Name:    Insurance ID:  [id number]      Pharmacy Information (if different than what is on RX)  Name:  [facility]  Phone:  [phone]

## 2020-05-26 NOTE — TELEPHONE ENCOUNTER
Prior Authorization Approval    Authorization Effective Date: 5/21/2020  Authorization Expiration Date: 5/21/2021  Medication: Lansoprazole 30mg DR Capsules-PA approved  Approved Dose/Quantity:   Reference #: [id number]   Insurance Company: [facility] - Phone [phone] Fax [phone]  Expected CoPay:       CoPay Card Available:      Foundation Assistance Needed:    Which Pharmacy is filling the prescription (Not needed for infusion/clinic administered): [id number] - [place], [place] - [address] [facility] [address] [facility]  Pharmacy Notified: Yes  Patient Notified: No-pharmacy will contact

## 2020-07-22 ENCOUNTER — VIRTUAL VISIT (OUTPATIENT)
Dept: FAMILY MEDICINE | Facility: CLINIC | Age: 40
End: 2020-07-22
Payer: COMMERCIAL

## 2020-07-22 DIAGNOSIS — E66.01 MORBID OBESITY DUE TO EXCESS CALORIES (H): ICD-10-CM

## 2020-07-22 DIAGNOSIS — G47.33 OSA (OBSTRUCTIVE SLEEP APNEA): Primary | ICD-10-CM

## 2020-07-22 DIAGNOSIS — K22.70 BARRETT'S ESOPHAGUS WITHOUT DYSPLASIA: ICD-10-CM

## 2020-07-22 PROCEDURE — 99213 OFFICE O/P EST LOW 20 MIN: CPT | Mod: 95 | Performed by: INTERNAL MEDICINE

## 2020-07-22 RX ORDER — LANSOPRAZOLE 30 MG/1
30 CAPSULE, DELAYED RELEASE ORAL 2 TIMES DAILY
Qty: 180 CAPSULE | Refills: 3 | Status: SHIPPED | OUTPATIENT
Start: 2020-07-22 | End: 2021-09-09

## 2020-07-22 NOTE — PROGRESS NOTES
"Lj Lamas is a 39 year old male who is being evaluated via a billable video visit.      The patient has been notified of following:     \"This video visit will be conducted via a call between you and your physician/provider. We have found that certain health care needs can be provided without the need for an in-person physical exam.  This service lets us provide the care you need with a video conversation.  If a prescription is necessary we can send it directly to your pharmacy.  If lab work is needed we can place an order for that and you can then stop by our lab to have the test done at a later time.    Video visits are billed at different rates depending on your insurance coverage.  Please reach out to your insurance provider with any questions.    If during the course of the call the physician/provider feels a video visit is not appropriate, you will not be charged for this service.\"    Patient has given verbal consent for Video visit? Yes  How would you like to obtain your AVS? MyChart  If you are dropped from the video visit, the video invite should be resent to: Text to cell phone: [phone]  Will anyone else be joining your video visit? No    Subjective     Lj Lamas is a 39 year old male who presents today via video visit for the following health issues:    [facility]    Video Start Time: 8:42 AM    History of mild KHUSHBU diagnosed at [facility]. several years ago  CPAP was considered optional  Symptoms improved for an interval with weight loss, but recently symptoms have reoccurred  Symptoms include severe daytime sleepiness, waking from sleep due to severe snoring   He admits to having gained weight since onset of COVID   He denies new peripheral edema, or orthopnea or new MORALES      Patient Active Problem List   Diagnosis     Morbid obesity due to excess calories (H)     Venous (peripheral) insufficiency     Dysphagia, unspecified type     Status post anterior cruciate ligament surgery     Trigeminal " neuralgia     Past Surgical History:   Procedure Laterality Date     ARTHROSCOPIC  RECONSTRUCTION/REPAIR LATERAL LIGAMENT KNEE Left 5/23/2018    Procedure: ARTHROSCOPIC  RECONSTRUCTION/REPAIR LATERAL LIGAMENT KNEE;;  Surgeon: Jarod Meyers MD;  Location: [facility] OR     ARTHROSCOPIC RECONSTRUCTION ANTERIOR CRUCIATE LIGAMENT Left 5/23/2018    Procedure: ARTHROSCOPIC RECONSTRUCTION ANTERIOR CRUCIATE LIGAMENT;  Left Knee  Arthroscopic Anterior Cruciate Ligament Reconstruction with Hamstring Allograft, Fibular Colateral Ligament Reconstruction with Allograft, and Peroneal Nerve Neurolysis;  Surgeon: Jarod Meyers MD;  Location: [facility] OR     ESOPHAGOSCOPY, GASTROSCOPY, DUODENOSCOPY (EGD), COMBINED N/A 3/28/2018    Procedure: COMBINED ESOPHAGOSCOPY, GASTROSCOPY, DUODENOSCOPY (EGD);  egd;  Surgeon: Charbel Wise MD;  Location: [facility] GI     ESOPHAGOSCOPY, GASTROSCOPY, DUODENOSCOPY (EGD), COMBINED N/A 8/15/2018    Procedure: COMBINED ESOPHAGOSCOPY, GASTROSCOPY, DUODENOSCOPY (EGD), BIOPSY SINGLE OR MULTIPLE;  Upper Endo;  Surgeon: Charbel Wise MD;  Location: [facility] GI     NO HISTORY OF SURGERY         Social History     Tobacco Use     Smoking status: Never Smoker     Smokeless tobacco: Never Used     Tobacco comment: occass cigars in college   Substance Use Topics     Alcohol use: Yes     Alcohol/week: 0.0 standard drinks     Comment: 1-2 drinks per month     Family History   Problem Relation Age of Onset     Diabetes Mother      Heart Disease Father      Diabetes Maternal Grandfather      Lung Cancer Maternal Grandfather      Bone Cancer Maternal Grandfather      Cancer Paternal Grandfather          Current Outpatient Medications   Medication Sig Dispense Refill     acetaminophen (TYLENOL) 325 MG tablet Take 1,000 mg by mouth        LANsoprazole (PREVACID) 30 MG DR capsule Take 1 capsule (30 mg) by mouth 2 times daily Open capsule put in apple sauce 180 capsule 3     Allergies   Allergen Reactions     No  Known Allergies        Reviewed and updated as needed this visit by Provider         Review of Systems   Pertinent +/-'s in HPI       Objective             Physical Exam     GENERAL: Healthy, alert and no distress  EYES: Eyes grossly normal to inspection.  No discharge or erythema, or obvious scleral/conjunctival abnormalities.  RESP: No audible wheeze, cough, or visible cyanosis.  No visible retractions or increased work of breathing.    SKIN: Visible skin clear. No significant rash, abnormal pigmentation or lesions.  NEURO: Cranial nerves grossly intact.  Mentation and speech appropriate for age.  PSYCH: Mentation appears normal, affect normal/bright, judgement and insight intact, normal speech and appearance well-groomed.      Diagnostic Test Results:  Labs reviewed in Epic        Assessment & Plan       ICD-10-CM    1. KHUSHBU (obstructive sleep apnea)  G47.33 SLEEP EVALUATION & MANAGEMENT REFERRAL - ADULT -[facility] [phone]  (Age 18 and up)   2. Morbid obesity due to excess calories (H)  E66.01    3. Breen's esophagus without dysplasia  K22.70 LANsoprazole (PREVACID) 30 MG DR capsule   I discussed a follow up sleep consultation at  sleep center.  Perhaps the [facility] Sleep study data can be reviewed. I informed him that another sleep test may be necessary.  I counseled him on the importance of weight loss to improve and control sleep symptom in addition to overall health risk.  We discussed some strategies to achieve weigh loss goals.           No follow-ups on file.    José Kan MD  [facility]      Video-Visit Details    Type of service:  Video Visit    Video End Time:9:08 AM    Originating Location (pt. Location): Home    Distant Location (provider location):  [facility]     Platform used for Video Visit: Doximity    No follow-ups on file.       José Kan MD

## 2020-08-24 ENCOUNTER — VIRTUAL VISIT (OUTPATIENT)
Dept: SLEEP MEDICINE | Facility: CLINIC | Age: 40
End: 2020-08-24
Attending: INTERNAL MEDICINE
Payer: COMMERCIAL

## 2020-08-24 VITALS — WEIGHT: 315 LBS | BODY MASS INDEX: 45.1 KG/M2 | HEIGHT: 70 IN

## 2020-08-24 DIAGNOSIS — E66.01 MORBID OBESITY DUE TO EXCESS CALORIES (H): ICD-10-CM

## 2020-08-24 DIAGNOSIS — G47.19 EXCESSIVE DAYTIME SLEEPINESS: ICD-10-CM

## 2020-08-24 DIAGNOSIS — G47.33 OSA (OBSTRUCTIVE SLEEP APNEA): Primary | ICD-10-CM

## 2020-08-24 PROCEDURE — 99203 OFFICE O/P NEW LOW 30 MIN: CPT | Mod: 95 | Performed by: PHYSICIAN ASSISTANT

## 2020-08-24 RX ORDER — FLUTICASONE PROPIONATE 50 MCG
1 SPRAY, SUSPENSION (ML) NASAL DAILY
COMMUNITY

## 2020-08-24 RX ORDER — LEVOCETIRIZINE DIHYDROCHLORIDE 5 MG/1
5 TABLET, FILM COATED ORAL EVERY EVENING
COMMUNITY

## 2020-08-24 ASSESSMENT — MIFFLIN-ST. JEOR: SCORE: 2516.52

## 2020-08-24 NOTE — PATIENT INSTRUCTIONS
Your BMI is Body mass index is 50.94 kg/m .  Weight management is a personal decision.  If you are interested in exploring weight loss strategies, the following discussion covers the approaches that may be successful. Body mass index (BMI) is one way to tell whether you are at a healthy weight, overweight, or obese. It measures your weight in relation to your height.  A BMI of 18.5 to 24.9 is in the healthy range. A person with a BMI of 25 to 29.9 is considered overweight, and someone with a BMI of 30 or greater is considered obese. More than two-thirds of American adults are considered overweight or obese.  Being overweight or obese increases the risk for further weight gain. Excess weight may lead to heart disease and diabetes.  Creating and following plans for healthy eating and physical activity may help you improve your health.  Weight control is part of healthy lifestyle and includes exercise, emotional health, and healthy eating habits. Careful eating habits lifelong are the mainstay of weight control. Though there are significant health benefits from weight loss, long-term weight loss with diet alone may be very difficult to achieve- studies show long-term success with dietary management in less than 10% of people. Attaining a healthy weight may be especially difficult to achieve in those with severe obesity. In some cases, medications, devices and surgical management might be considered.  What can you do?  If you are overweight or obese and are interested in methods for weight loss, you should discuss this with your provider.     Consider reducing daily calorie intake by 500 calories.     Keep a food journal.     Avoiding skipping meals, consider cutting portions instead.    Diet combined with exercise helps maintain muscle while optimizing fat loss. Strength training is particularly important for building and maintaining muscle mass. Exercise helps reduce stress, increase energy, and improves fitness.  Increasing exercise without diet control, however, may not burn enough calories to loose weight.       Start walking three days a week 10-20 minutes at a time    Work towards walking thirty minutes five days a week     Eventually, increase the speed of your walking for 1-2 minutes at time    In addition, we recommend that you review healthy lifestyles and methods for weight loss available through the National Institutes of Health patient information sites:  http://win.niddk.nih.gov/publications/index.htm    And look into health and wellness programs that may be available through your health insurance provider, employer, local community center, or jose club.    Weight management plan: Patient was referred to their PCP to discuss a diet and exercise plan.

## 2020-08-24 NOTE — PROGRESS NOTES
"Lj Lamas is a 40 year old male who is being evaluated via a billable video visit.      The patient has been notified of following:     \"This video visit will be conducted via a call between you and your physician/provider. We have found that certain health care needs can be provided without the need for an in-person physical exam.  This service lets us provide the care you need with a video conversation.  If a prescription is necessary we can send it directly to your pharmacy.  If lab work is needed we can place an order for that and you can then stop by our lab to have the test done at a later time.    Video visits are billed at different rates depending on your insurance coverage.  Please reach out to your insurance provider with any questions.    If during the course of the call the physician/provider feels a video visit is not appropriate, you will not be charged for this service.\"    Patient has given verbal consent for Video visit? Yes  How would you like to obtain your AVS? MyChart  If you are dropped from the video visit, the video invite should be resent to: Text to cell phone: [phone]  Will anyone else be joining your video visit? {:[id number]}  {If patient encounters technical issues they should call [phone] :[id number]}      Video-Visit Details    Type of service:  Video Visit    Video Start Time: {video visit start/end time:152948}  Video End Time: {video visit start/end time:152948}    Originating Location (pt. Location): {video visit patient location:415836::\"Home\"}    Distant Location (provider location):  [facility]     Platform used for Video Visit: {Virtual Visit Platforms:119902::\"[employer]\"}    {signature options:595795}      "

## 2020-08-24 NOTE — PROGRESS NOTES
"Lj Lamas is a 40 year old male who is being evaluated via a billable video visit.      The patient has been notified of following:     \"This video visit will be conducted via a call between you and your physician/provider. We have found that certain health care needs can be provided without the need for an in-person physical exam.  This service lets us provide the care you need with a video conversation.  If a prescription is necessary we can send it directly to your pharmacy.  If lab work is needed we can place an order for that and you can then stop by our lab to have the test done at a later time.    Video visits are billed at different rates depending on your insurance coverage.  Please reach out to your insurance provider with any questions.    If during the course of the call the physician/provider feels a video visit is not appropriate, you will not be charged for this service.\"    Patient has given verbal consent for Video visit? Yes  How would you like to obtain your AVS? MyChart  If you are dropped from the video visit, the video invite should be resent to: Text to cell phone: [phone]        Video-Visit Details    Type of service:  Video Visit    Video Start Time: 3:04PM  Video End Time: 3:52PM    Originating Location (pt. Location): Home    Distant Location (provider location):  [facility]     Platform used for Video Visit: Joann Brooke PA-C    [facility] [place]   Outpatient Sleep Medicine Consultation  August 24, 2020      Name: Lj Lamas MRN# [id number]   Age: 40 year old YOB: 1980     Date of Consultation: August 24, 2020  Consultation is requested by: José Kan MD  [address] [place] [address]  [place] [address] José Kan  Primary care provider: José Kan       Chief Complaint / Reason for Sleep Consult:     Establish care         History of Present Illness:     Lj Lamas is a 40 year old male who " "presents to the clinic to establish care for his mild obstructive sleep apnea. Other past medical history significant for GERD/Breen's esophagus, venus insufficiency, and morbid obesity.     Patient was originally diagnosed with mild KHUSHBU via PSG through [facility] on 11/9/2015 (377#) with an AHI 5.2/hr, RDI 10.9/hr, mean O2 sat 95.2%, and O2 moises 85%. No treatment was initiated at that time. Primary reason for today's visit is to discuss potential treatment options for his KHUSHBU and his symptoms have seemed to progressed over the last couple years with noticeable worsening the past few months. Over the past month has noticed significant increase in apneic events and associated gasp arousals - \"I will wake up in a panic and I can tell that I don't have any breath in my lungs and have to catch my breath and start breathing again\". States \"it's to the point now where I have anxiety about going to sleep because I know I stop breathing\".     In addition to apneas/gasp arousals he does have loud snoring. Currently sleeping with wedge pillow \"mainly for GERD but also to help my snoring\". Using Flonase and OTC antihistamine to treat upper airway inflammation to help with snoring but not particularly helpful - \" I mean I still snore loud\". Sleeps alone, no regular bed partner. Sleeps on back primarily but rarely sides/stomach. Rare nocturia. Reports nightly nocturnal GERD even despite daily PPI. He denies morning headaches or confusion.  Reports morning dry mouth. Allergy associated nasal/sinus congestion, typically well treated with Flonase/Xyzal.     Upon waking feels unrefreshed \"most days\". On weekdays, goes to bed at 11:00PM and awakens for the day at 6:30AM.   On weekends, bedtime is closer to midnight and wake between 8-9:00AM. Falls asleep \"pretty much immediately\", denies any significant difficulty falling asleep. Wakes 1-2 times per night with apneic events. Denies planned naps, but does admit to inadvertent " "dozing \"on weekends sometimes I don't know it just happens when I relax in the chair and my son will wake me up\". Nirav difficulty staying awake if active or engaged in conversation. 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 Denies any difficulty staying awake while driving now, though does report that he will occasionally pull over and get a caffeinated beverage if he ever starts to feel tired while driving. Patient was counseled on the importance of driving while alert, to pull over if drowsy, or nap before getting into the vehicle if sleepy.    No dream enactment behavior. No somniloquy.  No somnambulism.  No nightmares or night terrors. Denies bruxism.     Patient reports typical restless legs syndrome symptoms once a week. Describes feeling as \"creepy crawly is good way to describe it its like sometimes I have to check and see if a bug is on me and its not\". Sensation occurs primarily in left leg only - \"I have had some stuff with my left leg so its mostly in the left but sometimes in my right but that's really really uncommon\". Reports history of kicking/leg movements in his sleep but this has resolved - \"I dont think I do it now though\".     [place] Total Score 8/24/2020   Total score - [place] 12     TAMEKA Total Score: 11          Medications:     Current Outpatient Medications   Medication Sig     fluticasone (FLONASE) 50 MCG/ACT nasal spray Spray 1 spray into both nostrils daily     LANsoprazole (PREVACID) 30 MG DR capsule Take 1 capsule (30 mg) by mouth 2 times daily Open capsule put in apple sauce     levocetirizine (XYZAL) 5 MG tablet Take 5 mg by mouth every evening     No current facility-administered medications for this visit.              Allergies:     Allergies   Allergen Reactions     No Known Allergies             Past Medical History:     Past Medical History:   Diagnosis Date     Breen's esophagus without dysplasia      Gastroesophageal reflux disease      " "Obese      Other chronic pain     low back pain - compressed vertebrae     Sleep apnea     mild - does not use CPAP     Venous (peripheral) insufficiency 2/9/2017             Past Surgical History:    Previous upper airway surgery: denies   Past Surgical History:   Procedure Laterality Date     ARTHROSCOPIC  RECONSTRUCTION/REPAIR LATERAL LIGAMENT KNEE Left 5/23/2018    Procedure: ARTHROSCOPIC  RECONSTRUCTION/REPAIR LATERAL LIGAMENT KNEE;;  Surgeon: Jarod Meyers MD;  Location: [facility] OR     ARTHROSCOPIC RECONSTRUCTION ANTERIOR CRUCIATE LIGAMENT Left 5/23/2018    Procedure: ARTHROSCOPIC RECONSTRUCTION ANTERIOR CRUCIATE LIGAMENT;  Left Knee  Arthroscopic Anterior Cruciate Ligament Reconstruction with Hamstring Allograft, Fibular Colateral Ligament Reconstruction with Allograft, and Peroneal Nerve Neurolysis;  Surgeon: Jarod Meyers MD;  Location: [facility] OR     ESOPHAGOSCOPY, GASTROSCOPY, DUODENOSCOPY (EGD), COMBINED N/A 3/28/2018    Procedure: COMBINED ESOPHAGOSCOPY, GASTROSCOPY, DUODENOSCOPY (EGD);  egd;  Surgeon: Charbel Wise MD;  [facility]     ESOPHAGOSCOPY, GASTROSCOPY, DUODENOSCOPY (EGD), COMBINED N/A 8/15/2018    Procedure: COMBINED ESOPHAGOSCOPY, GASTROSCOPY, DUODENOSCOPY (EGD), BIOPSY SINGLE OR MULTIPLE;  Upper Endo;  Surgeon: Charbel Wise MD;  Location[facility]     NO HISTORY OF SURGERY              Social History:     Social History     Tobacco Use     Smoking status: Never Smoker     Smokeless tobacco: Never Used     Tobacco comment: occass cigars in 4meee   Substance Use Topics     Alcohol use: Yes     Alcohol/week: 0.0 standard drinks     Comment: 1-2 drinks per month     Chemical History:  Alcohol use: Occasionally, once or twice per month       Tobacco use: None    Illicit substances: None  Caffeine intake: Drinks \"a Armenian press of coffee\" and \"a soda every once in a while\". Last caffeine intake is usually before 8:00PM, does not interfere with ability to fall asleep " "at night.         Family History:     Family History   Problem Relation Age of Onset     Diabetes Mother      Heart Disease Father      Diabetes Maternal Grandfather      Lung Cancer Maternal Grandfather      Bone Cancer Maternal Grandfather      Cancer Paternal Grandfather         Sleep Family Hx: Patient's mother has KHUSHBU treated with CPAP. Otherwise, denies any other sleep disorders in family.          Physical Examination:   Ht 1.778 m (5' 10\")   Wt (!) 161 kg (355 lb)   BMI 50.94 kg/m    General appearance: Obese. Awake, alert, cooperative. Well groomed. Sitting comfortably in chair. In no apparent distress.  HEENT: Head: Normocephalic, atraumatic. Eyes:Conjunctiva clear. Sclera normal. Nose: External appearance without deformity.   Neck: Visibly enlarged girth.   Cardiovascular: No JVD  Pulmonary:  Able to speak easily in full sentences. No cough or wheeze.   Skin:  No rashes or significant lesions on visible skin.   Neurologic: Alert, oriented x3.   Psychiatric: Mood euthymic. Affect congruent with full range and intensity.         Data: All pertinent previous laboratory data reviewed     No results found for: PH, PHARTERIAL, PO2, [id number], SAT, PCO2, HCO3, BASEEXCESS, COLTEN, BEB  No results found for: TSH  Lab Results   Component Value Date    GLC 92 05/23/2018     Lab Results   Component Value Date    HGB 15.9 05/14/2018     No results found for: BUN, CR  No results found for: AST, ALT, GGT, ALKPHOS, BILITOTAL, BILICONJ, BILIDIRECT, RUTH  No results found for: UAMP, UBARB, BENZODIAZEUR, UCANN, UCOC, OPIT, UPCP    Chest x-ray 3/19/2018: IMPRESSION: Heart size is normal. No pleural effusion, pneumothorax, or abnormal area of consolidation. Suspect old healed right posterolateral rib fracture of the sixth rib.           Assessment and Plan:   1. KHUSHBU (obstructive sleep apnea)  2. Excessive daytime sleepiness  3. Morbid obesity due to excess calories (H)    Patient presents to the clinic today with a 5-year " history of mild obstructive sleep apnea that is currently untreated.  Primary reason for today's visit is to discuss treatment options given that symptoms of KHUSHBU have become progressive/more bothersome over the past few months.  Patient also endorses some anxiety surrounding sleep given frequency of gasp arousals/apneic episodes.    We discussed the option of pursuing a repeat polysomnogram today. He has gained approximetly 15-20lbs since his last sleep study so it is likely that his sleep apnea has increased in severity. Though updating testing would be helpful in determining current disease severity, it would not change our treatment plan or treatments available and thus we agreed not to update testing today simply for the sake of updating.    We discussed various options regarding treatment of obstructive sleep apnea including: CPAP therapy, oral appliance, positional restriction, and surgical options. He has tried an over-the-counter mouth appliance in the past and did not tolerate well, so is not particularly interested in mandibular advancement device. Does not think that he would tolerate the combination of positional restriction device and wedge pillow he uses for his GERD. Lastly, is not interested in any surgical options at this time. Thus, patient has opted for CPAP therapy. Prescription sent today to [facility] to start autoCPAP [id number]. Patient will be enrolled in [facility] to help with PAP acclimation.   - Comprehensive DME    He will be seen back approximately 2 months after starting PAP therapy to ensure compliance and review treatment outcomes.  However, if he develops any difficulties with treatment he is instructed to contact us or DME company immediately to troubleshoot problems.      Patient was counseled on the importance of driving while alert, to pull over if drowsy, or nap before getting into the vehicle if sleepy.      Copy to: José Kan PA-C  Aug 24,  2020     [facility]  [address] , [place] [address]     [facility] [place]  [address], [place] [address]

## 2020-09-03 ENCOUNTER — TELEPHONE (OUTPATIENT)
Dept: SLEEP MEDICINE | Facility: CLINIC | Age: 40
End: 2020-09-03

## 2020-09-03 NOTE — NURSING NOTE
Pt called and message left for pt to inform him we are confirming that [facility] has contacted him regarding cpap set up.  If he has been set up and compliance appt is needed pt was asked to call back to schedule.    BILLY Vera

## 2020-09-03 NOTE — TELEPHONE ENCOUNTER
Pt called and message left for pt to inform him we are confirming that [facility] has contacted him regarding cpap set up.  If he has been set up and compliance appt is needed pt was asked to call back to schedule.    BILLY Vera

## 2020-09-04 NOTE — TELEPHONE ENCOUNTER
Pt called in stating that [facility] will not fill cpap rx due to no sleep study on file. Sleep Study is scanned into Epic. Please advise

## 2020-09-11 ENCOUNTER — PATIENT OUTREACH (OUTPATIENT)
Dept: GASTROENTEROLOGY | Facility: CLINIC | Age: 40
End: 2020-09-11

## 2020-09-11 NOTE — PROGRESS NOTES
Care Coordination Telephone Call  [facility]     Called patient to clarify request for  Prescription refill for Previd - per patient he is taking this only once per day and does not need refill.  States that this request is not correct.     I have called [facility] and clarif prescription for Prevacid, one per day #90 3 refills per Dr. Wise.     Concepcion BILLINGSN, HNBC, STAR-T  RN Care Coordinator  [facility]  Ph: [phone]  FAX: [phone]

## 2020-10-14 ENCOUNTER — DOCUMENTATION ONLY (OUTPATIENT)
Dept: SLEEP MEDICINE | Facility: CLINIC | Age: 40
End: 2020-10-14

## 2020-10-14 NOTE — PROGRESS NOTES
Patient was offered choice of vendor and chose [facility].  Patient Lj Lamas was set up at [place] on October 09, 2020. Patient received a [employer] DreamStation Auto. Pressures were set at 5-15 cm H2O.   Patient s ramp is 5 cm H2O for 45 min and FLEX/EPR is A Flex.  Patient received a Resmed Mask name: AIRFIT N30  Nasal mask size Medium, heated tubing and heated humidifier.  Patient does need to meet compliance.  Loni Lagos

## 2020-10-19 ENCOUNTER — DOCUMENTATION ONLY (OUTPATIENT)
Dept: SLEEP MEDICINE | Facility: CLINIC | Age: 40
End: 2020-10-19

## 2020-10-19 NOTE — PROGRESS NOTES
3 DAY STM VISIT    Diagnostic AHI: 5.8    PSG    Patient contacted for 3 day STM visit    Confirmed with patient at time of call- N/A Patient is still interested in STM service     Message left for patient to return call    Replacement device: Yes  STM ordered by provider: Yes     Device type: Auto-CPAP  PAP settings from order::  CPAP min 5 cm  H20       CPAP max 15 cm  H20         Mask type:    Nasal Mask     Device settings from machine      Min CPAP 5            Max CPAP 15         Assessment: Nightly usage over four hours.  Action plan: Patient to have 14 day STM visit. Patient has a follow up visit scheduled:   no, but is required by insurance for compliance.     Total time spent on accessing and  interpreting remote patient PAP therapy data  10 minutes  Total time spent counseling, coaching  and reviewing PAP therapy data with patient  0 minutes  [id number] no

## 2020-10-26 ENCOUNTER — NURSE TRIAGE (OUTPATIENT)
Dept: FAMILY MEDICINE | Facility: CLINIC | Age: 40
End: 2020-10-26

## 2020-10-29 ENCOUNTER — DOCUMENTATION ONLY (OUTPATIENT)
Dept: SLEEP MEDICINE | Facility: CLINIC | Age: 40
End: 2020-10-29
Payer: COMMERCIAL

## 2020-10-29 NOTE — PROGRESS NOTES
14 DAY STM VISIT    Diagnostic AHI: 5.2  PSG      Message left for patient to return call.     Assessment: Pt meeting objective benchmarks.       Action plan: pt to have 30 day STM visit.    Device type: Auto-CPAP    PAP settings: CPAP min 5  cm  H20       CPAP max 15 cm  H20       90th % pressure 8.2  cm  H20    Mask type:  Nasal Mask    Objective measures: 14 day rolling measures         Compliance  85 %     % of night spent in large leak  0 % last  upload      AHI 4.21   last  upload      Average number of minutes 385          Objective measure goal  Compliance   Goal >70%  Leak   Goal < 10%  AHI  Goal < 5  Usage  Goal >240      Total time spent on accessing and interpreting remote patient PAP therapy data  10 minutes      Total time spent counseling, coaching  and reviewing PAP therapy data with patient  1 minutes      [id number]  [id number] no (3 day STM)

## 2020-10-30 ENCOUNTER — OFFICE VISIT (OUTPATIENT)
Dept: FAMILY MEDICINE | Facility: CLINIC | Age: 40
End: 2020-10-30
Payer: COMMERCIAL

## 2020-10-30 VITALS
SYSTOLIC BLOOD PRESSURE: 130 MMHG | HEART RATE: 67 BPM | DIASTOLIC BLOOD PRESSURE: 80 MMHG | BODY MASS INDEX: 45.1 KG/M2 | HEIGHT: 70 IN | TEMPERATURE: 98.7 F | WEIGHT: 315 LBS | OXYGEN SATURATION: 98 %

## 2020-10-30 DIAGNOSIS — R00.2 PALPITATIONS: Primary | ICD-10-CM

## 2020-10-30 DIAGNOSIS — G47.33 OBSTRUCTIVE SLEEP APNEA SYNDROME: ICD-10-CM

## 2020-10-30 DIAGNOSIS — Z23 NEED FOR PROPHYLACTIC VACCINATION AND INOCULATION AGAINST INFLUENZA: ICD-10-CM

## 2020-10-30 LAB
ERYTHROCYTE [DISTWIDTH] IN BLOOD BY AUTOMATED COUNT: 13.3 % (ref 10–15)
HCT VFR BLD AUTO: 47.3 % (ref 40–53)
HGB BLD-MCNC: 16.3 G/DL (ref 13.3–17.7)
MCH RBC QN AUTO: 30.4 PG (ref 26.5–33)
MCHC RBC AUTO-ENTMCNC: 34.5 G/DL (ref 31.5–36.5)
MCV RBC AUTO: 88 FL (ref 78–100)
PLATELET # BLD AUTO: 229 10E9/L (ref 150–450)
RBC # BLD AUTO: 5.37 10E12/L (ref 4.4–5.9)
WBC # BLD AUTO: 9.2 10E9/L (ref 4–11)

## 2020-10-30 PROCEDURE — 84443 ASSAY THYROID STIM HORMONE: CPT | Performed by: INTERNAL MEDICINE

## 2020-10-30 PROCEDURE — 36415 COLL VENOUS BLD VENIPUNCTURE: CPT | Performed by: INTERNAL MEDICINE

## 2020-10-30 PROCEDURE — 80048 BASIC METABOLIC PNL TOTAL CA: CPT | Performed by: INTERNAL MEDICINE

## 2020-10-30 PROCEDURE — 85027 COMPLETE CBC AUTOMATED: CPT | Performed by: INTERNAL MEDICINE

## 2020-10-30 PROCEDURE — 90686 IIV4 VACC NO PRSV 0.5 ML IM: CPT | Performed by: INTERNAL MEDICINE

## 2020-10-30 PROCEDURE — 90471 IMMUNIZATION ADMIN: CPT | Performed by: INTERNAL MEDICINE

## 2020-10-30 PROCEDURE — 99214 OFFICE O/P EST MOD 30 MIN: CPT | Mod: 25 | Performed by: INTERNAL MEDICINE

## 2020-10-30 ASSESSMENT — MIFFLIN-ST. JEOR: SCORE: 2611.77

## 2020-10-30 NOTE — PROGRESS NOTES
"Subjective     Lj Lamas is a 40 year old male who presents to clinic today for the following health issues:    HPI        2 episodes in last 3 weeks of palpitations lasting minutes to hours and resolving without intervention   Onset while sleeping  No exertional symptoms  Mild dyspnea without chest pain during spells  No new dyspnea, orthopnea, PND, chronic lower extremity edema unchanged  Just got CPAP machine 2 weeks ago and it is helping a lot         Review of Systems   Constitutional, HEENT, cardiovascular, pulmonary, gi and gu systems are negative, except as otherwise noted.      Objective    /80 (BP Location: Right arm, Cuff Size: Adult Large)   Pulse 67   Temp 98.7  F (37.1  C) (Temporal)   Ht 1.778 m (5' 10\")   Wt (!) 170.6 kg (376 lb)   SpO2 98%   BMI 53.95 kg/m    Body mass index is 53.95 kg/m .  Physical Exam   GENERAL: healthy, alert and no distress  RESP: lungs clear to auscultation - no rales, rhonchi or wheezes  CV: difficult exam due to habitus, Heart with regular rate and rhythm.  No obvious heart murmurs.    ABDOMEN: Obese,soft, nontender, no hepatosplenomegaly, no masses and bowel sounds normal  MS: no gross musculoskeletal defects noted, no edema  NEURO: Normal strength and tone, mentation intact and speech normal  PSYCH: mentation appears normal, affect normal/bright            Assessment & Plan     Palpitations  Check below to diagnosis underlying rhythm associated with spells   - Echocardiogram Complete; Future  - Cardiac Event Monitor Adult Pediatric; Future  - CBC with platelets  - Basic metabolic panel  - TSH with free T4 reflex    Obstructive sleep apnea syndrome  Continue CPAP    Need for prophylactic vaccination and inoculation against influenza    - INFLUENZA VACCINE IM > 6 MONTHS VALENT IIV4 [12210]  - EA ADD'L VACCINE            Return in about 6 weeks (around 12/11/2020) for recheck.  Patient instructed to return to clinic or contact us sooner if symptoms " worsen or new symptoms develop.     José Kan MD  [facility]

## 2020-10-30 NOTE — PATIENT INSTRUCTIONS
Call [phone] to schedule your echocardiogram (ultrasound of the heart) and event monitor hook up.

## 2020-10-31 LAB
ANION GAP SERPL CALCULATED.3IONS-SCNC: 2 MMOL/L (ref 3–14)
BUN SERPL-MCNC: 17 MG/DL (ref 7–30)
CALCIUM SERPL-MCNC: 9.1 MG/DL (ref 8.5–10.1)
CHLORIDE SERPL-SCNC: 107 MMOL/L (ref 94–109)
CO2 SERPL-SCNC: 29 MMOL/L (ref 20–32)
CREAT SERPL-MCNC: 1.43 MG/DL (ref 0.66–1.25)
GFR SERPL CREATININE-BSD FRML MDRD: 60 ML/MIN/{1.73_M2}
GLUCOSE SERPL-MCNC: 81 MG/DL (ref 70–99)
POTASSIUM SERPL-SCNC: 4.3 MMOL/L (ref 3.4–5.3)
SODIUM SERPL-SCNC: 138 MMOL/L (ref 133–144)
TSH SERPL DL<=0.005 MIU/L-ACNC: 1.64 MU/L (ref 0.4–4)

## 2020-11-01 NOTE — RESULT ENCOUNTER NOTE
The following letter pertains to your most recent diagnostic tests:    -TSH (thyroid stimulating hormone) level is normal which indicates normal circulating thyroid hormone levels.      -Kidney function is slightly abnormal (creatinine and GFR), Sodium is normal for you, Potassium is normal for you, Calcium is normal for you, Glucose (blood sugar) is normal for you.     -Your complete blood counts including your hemoglobin returned normal for you.         Bottom line:  With the exception of a mildly elevated serum creatine as mentioned above, there are no other abnormalities on these lab results.  Specifically, there are no findings which would explain abnormal heart rhythms.   The creatinine should be rechecked.  It can often be elevated due to a mildly dehydrated state.  As such, I would recommend returning to the lab in a well hydrated state.     Follow up:  Lab appointment as your convenience in a well hydrated state to recheck the creatinine       Sincerely,    Dr. Kan

## 2020-11-10 ENCOUNTER — HOSPITAL ENCOUNTER (OUTPATIENT)
Dept: CARDIOLOGY | Facility: CLINIC | Age: 40
End: 2020-11-10
Attending: INTERNAL MEDICINE
Payer: COMMERCIAL

## 2020-11-10 DIAGNOSIS — R00.2 PALPITATIONS: ICD-10-CM

## 2020-11-10 PROCEDURE — 255N000002 HC RX 255 OP 636: Performed by: INTERNAL MEDICINE

## 2020-11-10 PROCEDURE — 93306 TTE W/DOPPLER COMPLETE: CPT | Mod: 26 | Performed by: INTERNAL MEDICINE

## 2020-11-10 PROCEDURE — 93270 REMOTE 30 DAY ECG REV/REPORT: CPT

## 2020-11-10 PROCEDURE — 93272 ECG/REVIEW INTERPRET ONLY: CPT | Performed by: INTERNAL MEDICINE

## 2020-11-10 RX ADMIN — HUMAN ALBUMIN MICROSPHERES AND PERFLUTREN 3 ML: 10; .22 INJECTION, SOLUTION INTRAVENOUS at 14:10

## 2020-11-10 NOTE — LETTER
November 10, 2020      Lj John  [address]  [facility] [address]        Dear ,    We are writing to inform you of your test results.    The following letter pertains to your most recent diagnostic tests:     Good news! The echocardiogram does not show any structural problems with the heart that may predispose you to having irregular heart beats.        Resulted Orders   Echocardiogram Complete    Narrative    [id number]  [id number]  [id number]  [id number]^JALEN^RICH^CARTER        [facility]  Echocardiography Laboratory  [address]  [address]  [place] [address]  Phone [phone]  Fax [phone]        Name: LJ JOHN  MRN: [id number]  : 1980  Study Date: 11/10/2020 01:36 PM  Age: 40 yrs  Gender: Male  Patient Location: [facility]  Reason For Study: Palpitations  Ordering Physician: RICH RAO  Referring Physician: RICH RAO  Performed By: Anderson Díaz RDCS     BSA: 2.7 m2  Height: 70 in  Weight: 376 lb  HR: 72  BP: 148/76 mmHg  _____________________________________________________________________________  __     Procedure  Complete Echo Adult. Optison (NDC #[id number]) given intravenously.     _____________________________________________________________________________  __        Interpretation Summary     Technically difficult imaging.  The left ventricle is normal in structure, function and size.  The visual ejection fraction is estimated at 60-65%.  The right ventricle is normal in structure, function and size.  Sinus rhythm was noted.  Doppler interrogation does not demonstrate significant stenosis or  insufficiency involving cardiac valves.     No old studies available for comparison  _____________________________________________________________________________  __        Left Ventricle  The left ventricle is normal in structure, function and size. The visual  ejection fraction is  estimated at 60-65%. Left ventricular diastolic function  is normal. No regional wall motion abnormalities noted. There is no thrombus  seen in the left ventricle.     Right Ventricle  The right ventricle is normal in structure, function and size. There is no  mass or thrombus in the right ventricle.     Atria  Normal left atrial size. Right atrial size is normal. There is no atrial shunt  seen. The left atrial appendage is not well visualized.     Mitral Valve  The mitral valve leaflets appear normal. There is no evidence of stenosis,  fluttering, or prolapse. There is no mitral regurgitation noted. There is no  mitral valve stenosis.     Tricuspid Valve  Normal tricuspid valve. No tricuspid regurgitation. Right ventricular systolic  pressure could not be approximated due to inadequate tricuspid regurgitation.  There is no tricuspid stenosis.        Aortic Valve  The aortic valve is not well visualized. No aortic regurgitation is present.  No aortic stenosis is present.     Pulmonic Valve  The pulmonic valve is not well visualized. There is no pulmonic valvular  regurgitation. There is no pulmonic valvular stenosis.     Vessels  The aortic root is normal size. Normal size ascending aorta. Inferior vena  cava not well visualized for estimation of right atrial pressure. The  pulmonary artery is normal size.     Pericardium  The pericardium appears normal. There is no pleural effusion.     Rhythm  Sinus rhythm was noted.     _____________________________________________________________________________  __  MMode/2D Measurements & Calculations  IVSd: 1.3 cm  LVIDd: 5.6 cm  LVIDs: 3.7 cm  LVPWd: 1.3 cm  FS: 33.5 %  LV mass(C)d: 312.5 grams  LV mass(C)dI: 114.5 grams/m2  Ao root diam: 3.7 cm  LA dimension: 4.6 cm     asc Aorta Diam: 3.4 cm  LA/Ao: 1.2  LVOT diam: 2.3 cm  LVOT area: 4.2 cm2  LA Volume (BP): 104.0 ml  LA Volume Index (BP): 38.1 ml/m2  RWT: 0.46        Doppler Measurements & Calculations  MV E max bhavin:  102.0 cm/sec  MV A max john: 71.3 cm/sec  MV E/A: 1.4  MV dec slope: 548.4 cm/sec2  MV dec time: 0.19 sec  Ao V2 max: 156.1 cm/sec  Ao max PG: 10.0 mmHg  Ao V2 mean: 104.3 cm/sec  Ao mean P.1 mmHg  Ao V2 VTI: 31.2 cm  DERRICK(I,D): 3.1 cm2  DERRICK(V,D): 2.8 cm2     LV V1 max P.3 mmHg  LV V1 max: 103.3 cm/sec  LV V1 VTI: 22.6 cm  SV(LVOT): 95.4 ml  SI(LVOT): 34.9 ml/m2  AV John Ratio (DI): 0.66  DERRICK Index (cm2/m2): 1.1  E/E' av.6  Lateral E/e': 6.9  Medial E/e': 12.3           _____________________________________________________________________________  __           Report approved by: Dr. Julius Azar 11/10/2020 02:32 PM          If you have any questions or concerns, please call the clinic at the number listed above.       Sincerely,     Dr. Loreta MD/ alton ma

## 2020-11-10 NOTE — RESULT ENCOUNTER NOTE
The following letter pertains to your most recent diagnostic tests:    Good news! The echocardiogram does not show any structural problems with the heart that may predispose you to having irregular heart beats.        Sincerely,    Dr. Kan

## 2020-11-10 NOTE — LETTER
December 15, 2020      Lj Lamas  [address]  [facility] [address]        Dear ,    The following letter pertains to your most recent diagnostic tests:     Good news! The 30 day event monitor did not demonstrate any dangerous heart arrhythmias to explain your resolved palpitation symptoms.             Sincerely,     Dr. Kan

## 2020-11-17 ENCOUNTER — DOCUMENTATION ONLY (OUTPATIENT)
Dept: SLEEP MEDICINE | Facility: CLINIC | Age: 40
End: 2020-11-17

## 2020-11-17 NOTE — PROGRESS NOTES
30 DAY [facility] VISIT    Diagnostic AHI: 5.2    PSG      Message left for patient to return call     Assessment: Pt meeting objective benchmarks.     Action plan: waiting for patient to return call.   Patient has not scheduled a follow up visit but it is required;  Device type: Auto-CPAP  PAP settings: CPAP min 5 cm  H20     CPAP max 15 cm  H20          90th % pressure 7.7  cm  H20    Mask type:  Nasal Mask    Objective measures: 14 day rolling measures         Compliance  71 %     % of night spent in large leak  0 % last  upload      AHI 2.63   last  upload      Average number of minutes 339          Objective measure goal  Compliance   Goal >70%  Leak   Goal < 10%  AHI  Goal < 5  Usage  Goal >240      Total time spent on accessing and interpreting remote patient PAP therapy data  10 minutes    Total time spent counseling, coaching  and reviewing PAP therapy data with patient  0 minutes    [id number] no this call  [id number]  at 3 or 14 day [facility]

## 2020-11-22 ENCOUNTER — HEALTH MAINTENANCE LETTER (OUTPATIENT)
Age: 40
End: 2020-11-22

## 2020-12-11 ENCOUNTER — VIRTUAL VISIT (OUTPATIENT)
Dept: FAMILY MEDICINE | Facility: CLINIC | Age: 40
End: 2020-12-11
Payer: COMMERCIAL

## 2020-12-11 VITALS — WEIGHT: 315 LBS | BODY MASS INDEX: 52.8 KG/M2

## 2020-12-11 DIAGNOSIS — R00.2 PALPITATIONS: Primary | ICD-10-CM

## 2020-12-11 DIAGNOSIS — R79.89 ELEVATED SERUM CREATININE: ICD-10-CM

## 2020-12-11 PROCEDURE — 99213 OFFICE O/P EST LOW 20 MIN: CPT | Mod: 95 | Performed by: INTERNAL MEDICINE

## 2020-12-11 NOTE — PROGRESS NOTES
"Lj Lamas is a 40 year old male who is being evaluated via a billable video visit.      The patient has been notified of following:     \"This video visit will be conducted via a call between you and your physician/provider. We have found that certain health care needs can be provided without the need for an in-person physical exam.  This service lets us provide the care you need with a video conversation.  If a prescription is necessary we can send it directly to your pharmacy.  If lab work is needed we can place an order for that and you can then stop by our lab to have the test done at a later time.    Video visits are billed at different rates depending on your insurance coverage.  Please reach out to your insurance provider with any questions.    If during the course of the call the physician/provider feels a video visit is not appropriate, you will not be charged for this service.\"    Patient has given verbal consent for Video visit? Yes  How would you like to obtain your AVS? MyChart  If you are dropped from the video visit, the video invite should be resent to: Text to cell phone: [phone]  Will anyone else be joining your video visit? No    Subjective     Lj Lamas is a 40 year old male who presents today via video visit for the following health issues:    HPI     Follow up palpitations          Video Start Time: 1429    No further palpitations since last visit  CPAP is helping with sleep and energy levels  Reviewed laboratory test results, echocardiogram results, the results of the 30-day event monitor are pending although there were no urgent warnings identified    Review of Systems         Objective           Vitals:  No vitals were obtained today due to virtual visit.    Physical Exam     GENERAL: Healthy, alert and no distress  EYES: Eyes grossly normal to inspection.  No discharge or erythema, or obvious scleral/conjunctival abnormalities.  RESP: No audible wheeze, cough, or " visible cyanosis.  No visible retractions or increased work of breathing.    SKIN: Visible skin clear. No significant rash, abnormal pigmentation or lesions.  NEURO: Cranial nerves grossly intact.  Mentation and speech appropriate for age.  PSYCH: Mentation appears normal, affect normal/bright, judgement and insight intact, normal speech and appearance well-groomed.              Assessment & Plan     Palpitations  Resolved symptoms.  Work-up for dangerous or reversible causes negative so far.  Await 30-day event monitor result.    Elevated serum creatinine  Unclear why this was elevated.  I think the first up would be to recheck in a well-hydrated state as previously stated.  Will set up a lab appointment for that purpose.              Return in about 1 week (around 12/18/2020) for Non-fasting Lab Only Visit to recheck serum creatinine.    José Kan MD  [facility]      Video-Visit Details    Type of service:  Video Visit    Video End Time:2:44 PM    Originating Location (pt. Location): Home    Distant Location (provider location):  [facility]     Platform used for Video Visit: Joann

## 2020-12-14 NOTE — RESULT ENCOUNTER NOTE
The following letter pertains to your most recent diagnostic tests:    Good news! The 30 day event monitor did not demonstrate any dangerous heart arrhythmias to explain your resolved palpitation symptoms.            Sincerely,    Dr. Kan

## 2020-12-18 DIAGNOSIS — R00.2 PALPITATIONS: ICD-10-CM

## 2020-12-18 PROCEDURE — 36415 COLL VENOUS BLD VENIPUNCTURE: CPT | Performed by: INTERNAL MEDICINE

## 2020-12-18 PROCEDURE — 80048 BASIC METABOLIC PNL TOTAL CA: CPT | Performed by: INTERNAL MEDICINE

## 2020-12-19 LAB
ANION GAP SERPL CALCULATED.3IONS-SCNC: 5 MMOL/L (ref 3–14)
BUN SERPL-MCNC: 13 MG/DL (ref 7–30)
CALCIUM SERPL-MCNC: 9.2 MG/DL (ref 8.5–10.1)
CHLORIDE SERPL-SCNC: 106 MMOL/L (ref 94–109)
CO2 SERPL-SCNC: 27 MMOL/L (ref 20–32)
CREAT SERPL-MCNC: 1.17 MG/DL (ref 0.66–1.25)
GFR SERPL CREATININE-BSD FRML MDRD: 76 ML/MIN/{1.73_M2}
GLUCOSE SERPL-MCNC: 97 MG/DL (ref 70–99)
POTASSIUM SERPL-SCNC: 4.5 MMOL/L (ref 3.4–5.3)
SODIUM SERPL-SCNC: 138 MMOL/L (ref 133–144)

## 2020-12-20 NOTE — RESULT ENCOUNTER NOTE
The following letter pertains to your most recent diagnostic tests:    -Kidney function is normal for you (Creatinine, GFR), Sodium is normal for you, Potassium is normal for you, Calcium is normal for you, Glucose (blood sugar) is normal for you.       Bottom line:  Good news! The kidney blood test returned to within the normal range.   I don't think there is anything wrong with your kidneys.        Sincerely,    Dr. Kan

## 2020-12-29 NOTE — PROGRESS NOTES
Patient returned call.    Subjective measures:   Pt states things are going well and has no issues or complaints.  Pt is benefiting from therapy.      Assessment: Pt meeting objective benchmarks.       Action plan:follow up visit scheduled.       Total time spent counseling, coaching  and reviewing PAP therapy data with patient  3 minutes     [id number] (this call)    [id number] no (previous call)

## 2021-01-06 ENCOUNTER — VIRTUAL VISIT (OUTPATIENT)
Dept: SLEEP MEDICINE | Facility: CLINIC | Age: 41
End: 2021-01-06
Payer: COMMERCIAL

## 2021-01-06 DIAGNOSIS — E66.01 MORBID OBESITY DUE TO EXCESS CALORIES (H): ICD-10-CM

## 2021-01-06 DIAGNOSIS — G47.19 EXCESSIVE DAYTIME SLEEPINESS: ICD-10-CM

## 2021-01-06 DIAGNOSIS — G47.33 OBSTRUCTIVE SLEEP APNEA SYNDROME: Primary | ICD-10-CM

## 2021-01-06 PROCEDURE — 99215 OFFICE O/P EST HI 40 MIN: CPT | Mod: 95 | Performed by: PHYSICIAN ASSISTANT

## 2021-01-06 NOTE — PATIENT INSTRUCTIONS

## 2021-01-06 NOTE — PROGRESS NOTES
"Lj Lamas is a 40 year old male who is being evaluated via a billable video visit.      How would you like to obtain your AVS? MyChart  If the video visit is dropped, the invitation should be resent by: Text to cell phone: [phone]  Will anyone else be joining your video visit? No      Video Start Time: 12:32PM         [facility] [place]   Outpatient Sleep Medicine  January 6, 2021      Name: Lj Lamas MRN# [id number]   Age: 40 year old YOB: 1980        Chief Complaint      Chief Complaint   Patient presents with     CPAP Follow Up            History of Present Illness:     Lj Lamas is a 40 year old male who presents to the clinic for follow-up of their mild obstructive sleep apnea treated with CPAP. Other past medical history significant for GERD/Breen's esophagus and morbid obesity.     Originally diagnosed via PSG through [facility] on 11/9/2015 (337#) with an AHI 5.2/hr, RDI 10.9/hr, mean O2 sat 95.2%, and O2 moises 85%. At that time no treatment was initiated. Presented to my clinic on 8/24/2020 with worsening of symptoms since his study, a 15-20lb weight gain, and desire to start treatment. Updated/repeat testing deferred as to expedite treatment but suspected worsening of KHUSHBU. Patient was set up with CPAP on 10/9/2020.     Patient is doing very well with his CPAP and states significant improvement in sleep quality, quantity, and daytime energy levels - \"everything is better it's just night and day\". Is waking feeling rested and more refreshed, sleeping better with less arousals, gasp/choking episodes in sleep have resolved, and has improved daytime energy levels. No longer falling asleep watching TV and no longer has desire to nap during the day. Patient states \"people always said it was effective and I was like yeah ok but now that I have it I realize what they meant\".     Patient is using a nasal pillow mask. The mask is comfortable overall but " "needs to be adjusted frequently - \"I have to fiddle with it a bunch\". Does endorse leak both out of mask that can blow into his eyes and some mouth leak, wakes with very dry mouth. No chinstrap. Curious about trying nasal pillow mask with nasal prongs and/or switching mask styles. No nasal dryness or epistaxis. They are not knowingly snoring with the mask on. They are not having gasp arousals anymore. They are not having pain/skin breakdown. The pressure settings are comfortable though sometimes feels he would like the pressure to be harder.     Bedtime is typically 11:30PM-12:00AM. Usually it takes about <15 minutes to fall asleep with the mask on. Wake time is typically 6:30-7:00AM.  Patient is using PAP therapy 6.3 hours per night per download. Uses CPAP for entire sleep duration.     Respironics Auto-PAP 5.0 - 15.0 cmH2O 30 day usage data:    86% of days with > 4 hours of use. 0/30 days with no use.   Average use 376 minutes per day.   Average leak 31.01 LPM.  Average % of night in large leak 11%.    CPAP 90% pressure 7.8cm.   AHI 4.34 events per hour.    SCALES:   INSOMNIA: TAMEKA today 6, last visit/pre-CPAP 11   SLEEPINESS: [place] today 6, last visit/pre-CPAP 12    Past medical/surgical history, family history, social history, medications and allergies were reviewed.           Physical Examination:   There were no vitals taken for this visit.  General appearance: Awake, alert, cooperative. Well groomed. Sitting comfortably in chair. In no apparent distress.  HEENT: Head: Normocephalic, atraumatic. Eyes:Conjunctiva clear. Sclera normal. Nose: External appearance without deformity.   Pulmonary:  Able to speak easily in full sentences. No cough or wheeze.   Skin:  No rashes or significant lesions on visible skin.   Neurologic: Alert, oriented x3.   Psychiatric: Mood euthymic. Affect congruent with full range and intensity.         Assessment and Plan:   1. Obstructive sleep apnea syndrome  2. Excessive daytime " sleepiness  3. Morbid obesity due to excess calories (H)    Patient's sleep apnea is adequately managed and well controlled with current PAP settings 5-62keT2A with residual AHI of 4.34 events per hour (goal <5). Compliance is excellent however we discussed goal of increasing total sleep time at night to 7 hours. Tolerating PAP very well overall but some issues with mask and leaking. Encouraged him to consider mask fit appointment with DME and/or trial of new mask style and/or addition of chin strap. He will call [facility]. Given occasional air hunger will increase starting pressure to 7cm, pressure change ordered for new pressure of 7-85ywD1P. He will call/MyChart if this is not tolerated in any way. Daytime symptoms and nighttime sleep quality significantly improved since started CPAP, ESS is now within normal range. Will continue nightly therapy.   - Comprehensive DME    Assuming Conrado does well with the above changes, will have him follow-up in 1 year, but of course sooner as needed.     CC:  José Kan PA-C  Jan 6, 2021     [facility]  3rd Floor  [address] [facility]  [address] [place] [id number]    Chart documentation was completed, in part, with Becual voice-recognition software. Even though reviewed, some grammatical, spelling, and word errors may remain.    Video-Visit Details    Type of service:  Video Visit    Video End Time:1:07PM    Originating Location (pt. Location): Home    Distant Location (provider location):  [facility]     Platform used for Video Visit: Press-sense  51 minutes spent on day of encounter doing chart review, history and exam, documentation, and further activities as noted above

## 2021-08-20 ENCOUNTER — MYC MEDICAL ADVICE (OUTPATIENT)
Dept: SLEEP MEDICINE | Facility: CLINIC | Age: 41
End: 2021-08-20

## 2021-08-20 DIAGNOSIS — G47.33 OBSTRUCTIVE SLEEP APNEA SYNDROME: Primary | ICD-10-CM

## 2021-08-20 DIAGNOSIS — K22.70 BARRETT'S ESOPHAGUS WITHOUT DYSPLASIA: ICD-10-CM

## 2021-08-20 RX ORDER — LANSOPRAZOLE 30 MG/1
CAPSULE, DELAYED RELEASE ORAL
Qty: 90 CAPSULE | Status: CANCELLED | OUTPATIENT
Start: 2021-08-20

## 2021-09-09 ENCOUNTER — TELEPHONE (OUTPATIENT)
Dept: GASTROENTEROLOGY | Facility: CLINIC | Age: 41
End: 2021-09-09

## 2021-09-09 RX ORDER — LANSOPRAZOLE 30 MG/1
CAPSULE, DELAYED RELEASE ORAL
Qty: 90 CAPSULE | Refills: 0 | Status: SHIPPED | OUTPATIENT
Start: 2021-09-09 | End: 2021-12-09

## 2021-09-09 NOTE — TELEPHONE ENCOUNTER
"Screening Questions  1. Are you active on mychart?    2. What insurance is in the chart? BCBS     2.  Ordering/Referring Provider: Charbel Wise MD in [facility]    3. BMI 50.9 = 5'10\" 355#    4. Are you on daily home oxygen? No     5. Do you have a history of difficult airway? No - does use CPAP for sleep apnea     6. Have you had a heart, lung, or liver transplant? No     7. Are you currently on dialysis?  No     8. Have you had a stroke or Transient ischemic atttack (TIA) within 6 months? No     9. In the past 6 months, have you had any heart related issues including cardiomyopathy or heart attack?         If yes, did it require cardiac stenting or other implantable device?  No     10. Do you have any implantable devices in your body (pacemaker, defib, LVAD)? No     11. Do you take nitroglycerin? If yes, how often? No     12. Are you currently taking any blood thinners?no     13. Are you a diabetic? No     14. (Females) Are you currently pregnant? NA   If yes, how many weeks?    15. Have you had a procedure in the past that was difficult to tolerate with conscious sedation? Any allergies to Fentanyl or Versed  No     16. Are you taking any scheduled prescription narcotics more than once daily?  No     17. Do you have any chemical dependencies such as alcohol, street drugs, or methadone? No     18. Do you have any history of post-traumatic stress syndrome or mental health issues? No     19. Do you transfer independently? Yes     20.  Do you have any issues with constipation?     21. Preferred Pharmacy for Pre Prescription    Scheduling Details    Which Colonoscopy Prep was Sent?: NA  Procedure Scheduled: EGD   Provider/Surgeon: JOVON   Date of Procedure: 10/06/2021  Location: [facility]  Caller (Please ask for phone number if not scheduled by patient): PATIENT       Sedation Type: CS   Conscious Sedation- Needs  for 6 hours after the procedure  MAC/General-Needs  for 24 hours after " procedure    Pre-op Required at [facility], [place], [place] and OR for MAC sedation:   (if yes advise patient they will need a pre-op prior to procedure)      Is patient on blood thinners? -NO  (If yes- inform patient to follow up with PCP or provider for follow up instructions)     Informed patient they will need an adult  YES   Cannot take any type of public or medical transportation alone    Informed Patient of COVID Test Requirement YES-SCHEDULED     Confirmed Nurse will call to complete assessment YES    Additional comments:

## 2021-09-10 DIAGNOSIS — Z11.59 ENCOUNTER FOR SCREENING FOR OTHER VIRAL DISEASES: ICD-10-CM

## 2021-09-19 ENCOUNTER — HEALTH MAINTENANCE LETTER (OUTPATIENT)
Age: 41
End: 2021-09-19

## 2021-09-28 ENCOUNTER — TELEPHONE (OUTPATIENT)
Dept: GASTROENTEROLOGY | Facility: CLINIC | Age: 41
End: 2021-09-28

## 2021-09-28 NOTE — TELEPHONE ENCOUNTER
Attempted to contact patient regarding upcoming EGD procedure on 10.6.2021 for pre assessment questions. No answer.     Left message to return call to [phone] #2    Covid test scheduled: 10.2.2021    Arrival time: 0815    Facility location: [facility]    Sedation type: ELVIA Calixto RN

## 2021-09-30 NOTE — TELEPHONE ENCOUNTER
Writer reviewed pre-assessment questions with patient prior to upcoming EGD on 10.6.2021.      Covid test scheduled: 10.2.2021    Arrival time: 0815    Facility location: [facility]    Sedation type: CS    Electronic Implantable devices? No    Blood thinners/Antiplatelet medication? No    Reviewed EGD prep instructions with patient.      Designated  policy reviewed with patient.     Patient verbalized understanding.  No further questions or concerns.    Giovanna Calixto RN

## 2021-10-02 ENCOUNTER — LAB (OUTPATIENT)
Dept: URGENT CARE | Facility: URGENT CARE | Age: 41
End: 2021-10-02
Payer: COMMERCIAL

## 2021-10-02 DIAGNOSIS — Z11.59 ENCOUNTER FOR SCREENING FOR OTHER VIRAL DISEASES: ICD-10-CM

## 2021-10-02 PROCEDURE — U0003 INFECTIOUS AGENT DETECTION BY NUCLEIC ACID (DNA OR RNA); SEVERE ACUTE RESPIRATORY SYNDROME CORONAVIRUS 2 (SARS-COV-2) (CORONAVIRUS DISEASE [COVID-19]), AMPLIFIED PROBE TECHNIQUE, MAKING USE OF HIGH THROUGHPUT TECHNOLOGIES AS DESCRIBED BY CMS-2020-01-R: HCPCS

## 2021-10-02 PROCEDURE — U0005 INFEC AGEN DETEC AMPLI PROBE: HCPCS

## 2021-10-03 LAB — SARS-COV-2 RNA RESP QL NAA+PROBE: NEGATIVE

## 2021-10-06 ENCOUNTER — HOSPITAL ENCOUNTER (OUTPATIENT)
Facility: CLINIC | Age: 41
Discharge: HOME | End: 2021-10-06
Attending: INTERNAL MEDICINE | Admitting: INTERNAL MEDICINE
Payer: COMMERCIAL

## 2021-10-06 VITALS
SYSTOLIC BLOOD PRESSURE: 134 MMHG | BODY MASS INDEX: 45.1 KG/M2 | HEART RATE: 63 BPM | RESPIRATION RATE: 14 BRPM | WEIGHT: 315 LBS | HEIGHT: 70 IN | DIASTOLIC BLOOD PRESSURE: 70 MMHG | TEMPERATURE: 98.6 F | OXYGEN SATURATION: 99 %

## 2021-10-06 LAB — UPPER GI ENDOSCOPY: NORMAL

## 2021-10-06 PROCEDURE — 88305 TISSUE EXAM BY PATHOLOGIST: CPT | Mod: TC | Performed by: INTERNAL MEDICINE

## 2021-10-06 PROCEDURE — 43239 EGD BIOPSY SINGLE/MULTIPLE: CPT | Performed by: INTERNAL MEDICINE

## 2021-10-06 PROCEDURE — G0500 MOD SEDAT ENDO SERVICE >5YRS: HCPCS | Performed by: INTERNAL MEDICINE

## 2021-10-06 PROCEDURE — 250N000011 HC RX IP 250 OP 636: Performed by: INTERNAL MEDICINE

## 2021-10-06 PROCEDURE — 250N000013 HC RX MED GY IP 250 OP 250 PS 637: Performed by: INTERNAL MEDICINE

## 2021-10-06 PROCEDURE — 250N000009 HC RX 250: Performed by: INTERNAL MEDICINE

## 2021-10-06 PROCEDURE — 88305 TISSUE EXAM BY PATHOLOGIST: CPT | Mod: 26 | Performed by: PATHOLOGY

## 2021-10-06 RX ORDER — FENTANYL CITRATE 50 UG/ML
INJECTION, SOLUTION INTRAMUSCULAR; INTRAVENOUS PRN
Status: COMPLETED | OUTPATIENT
Start: 2021-10-06 | End: 2021-10-06

## 2021-10-06 RX ORDER — ONDANSETRON 4 MG/1
4 TABLET, ORALLY DISINTEGRATING ORAL EVERY 6 HOURS PRN
Status: DISCONTINUED | OUTPATIENT
Start: 2021-10-06 | End: 2021-10-06 | Stop reason: HOSPADM

## 2021-10-06 RX ORDER — ONDANSETRON 2 MG/ML
4 INJECTION INTRAMUSCULAR; INTRAVENOUS
Status: DISCONTINUED | OUTPATIENT
Start: 2021-10-06 | End: 2021-10-06 | Stop reason: HOSPADM

## 2021-10-06 RX ORDER — NALOXONE HYDROCHLORIDE 0.4 MG/ML
0.4 INJECTION, SOLUTION INTRAMUSCULAR; INTRAVENOUS; SUBCUTANEOUS
Status: DISCONTINUED | OUTPATIENT
Start: 2021-10-06 | End: 2021-10-06 | Stop reason: HOSPADM

## 2021-10-06 RX ORDER — LIDOCAINE 40 MG/G
CREAM TOPICAL
Status: DISCONTINUED | OUTPATIENT
Start: 2021-10-06 | End: 2021-10-06 | Stop reason: HOSPADM

## 2021-10-06 RX ORDER — NALOXONE HYDROCHLORIDE 0.4 MG/ML
0.2 INJECTION, SOLUTION INTRAMUSCULAR; INTRAVENOUS; SUBCUTANEOUS
Status: DISCONTINUED | OUTPATIENT
Start: 2021-10-06 | End: 2021-10-06 | Stop reason: HOSPADM

## 2021-10-06 RX ORDER — PROCHLORPERAZINE MALEATE 10 MG
10 TABLET ORAL EVERY 6 HOURS PRN
Status: DISCONTINUED | OUTPATIENT
Start: 2021-10-06 | End: 2021-10-06 | Stop reason: HOSPADM

## 2021-10-06 RX ORDER — SIMETHICONE 40MG/0.6ML
SUSPENSION, DROPS(FINAL DOSAGE FORM)(ML) ORAL PRN
Status: COMPLETED | OUTPATIENT
Start: 2021-10-06 | End: 2021-10-06

## 2021-10-06 RX ORDER — FLUMAZENIL 0.1 MG/ML
0.2 INJECTION, SOLUTION INTRAVENOUS
Status: DISCONTINUED | OUTPATIENT
Start: 2021-10-06 | End: 2021-10-06 | Stop reason: HOSPADM

## 2021-10-06 RX ORDER — ONDANSETRON 2 MG/ML
4 INJECTION INTRAMUSCULAR; INTRAVENOUS EVERY 6 HOURS PRN
Status: DISCONTINUED | OUTPATIENT
Start: 2021-10-06 | End: 2021-10-06 | Stop reason: HOSPADM

## 2021-10-06 RX ADMIN — MIDAZOLAM 1 MG: 1 INJECTION INTRAMUSCULAR; INTRAVENOUS at 09:34

## 2021-10-06 RX ADMIN — MIDAZOLAM 1 MG: 1 INJECTION INTRAMUSCULAR; INTRAVENOUS at 09:42

## 2021-10-06 RX ADMIN — FENTANYL CITRATE 50 MCG: 50 INJECTION, SOLUTION INTRAMUSCULAR; INTRAVENOUS at 09:37

## 2021-10-06 RX ADMIN — SIMETHICONE 133 MG: 20 SUSPENSION/ DROPS ORAL at 09:16

## 2021-10-06 RX ADMIN — FENTANYL CITRATE 50 MCG: 50 INJECTION, SOLUTION INTRAMUSCULAR; INTRAVENOUS at 09:32

## 2021-10-06 RX ADMIN — TOPICAL ANESTHETIC 1 SPRAY: 200 SPRAY DENTAL; PERIODONTAL at 09:31

## 2021-10-06 RX ADMIN — MIDAZOLAM 1 MG: 1 INJECTION INTRAMUSCULAR; INTRAVENOUS at 09:32

## 2021-10-06 ASSESSMENT — MIFFLIN-ST. JEOR: SCORE: 2547.25

## 2021-10-06 NOTE — OR NURSING
Procedure: EGD with biopsies  Sedation: Conscious sedation   Specimens: x3 jars to path  O2: 2L NC    Patient tolerated procedure well. Patient stable on transfer to .

## 2021-10-07 LAB
PATH REPORT.COMMENTS IMP SPEC: NORMAL
PATH REPORT.FINAL DX SPEC: NORMAL
PATH REPORT.GROSS SPEC: NORMAL
PATH REPORT.MICROSCOPIC SPEC OTHER STN: NORMAL
PATH REPORT.RELEVANT HX SPEC: NORMAL
PHOTO IMAGE: NORMAL

## 2021-12-09 DIAGNOSIS — K22.70 BARRETT'S ESOPHAGUS WITHOUT DYSPLASIA: ICD-10-CM

## 2021-12-09 RX ORDER — LANSOPRAZOLE 30 MG/1
CAPSULE, DELAYED RELEASE ORAL
Qty: 90 CAPSULE | Refills: 4 | Status: SHIPPED | OUTPATIENT
Start: 2021-12-09 | End: 2023-02-17

## 2021-12-17 ENCOUNTER — NURSE TRIAGE (OUTPATIENT)
Dept: FAMILY MEDICINE | Facility: CLINIC | Age: 41
End: 2021-12-17
Payer: COMMERCIAL

## 2021-12-17 NOTE — TELEPHONE ENCOUNTER
"Nurse Triage SBAR    Is this a 2nd Level Triage? NO    Situation: Patient has tender lump on right jaw, appeared today.     Background : Has never happened before, unsure of cause.     Assessment : (See information below for more triage details.)    Recommendation : Advised to be seen in  today, no appointments available today in [place].     Protocol Recommended Disposition: Urgent care center     Marine Saenz RN  [facility]       Reason for Disposition    [1] Swelling is painful to touch AND [2] no fever    Additional Information    Negative: SEVERE pain (e.g., excruciating)    Negative: Patient sounds very sick or weak to the triager    Negative: [1] Swelling is painful to touch AND [2] fever    Negative: [1] Swelling is red AND [2] fever    Negative: [1] Swelling is red AND [2] size > 2 inches (5.0 cm) (Exception: itchy area of skin)    Negative: [1] Swelling of groin (inguinal area) AND [2] painful    Answer Assessment - Initial Assessment Questions  1. APPEARANCE of SWELLING: \"What does it look like?\" (e.g., lymph node, insect bite, mole)      Circular, dome shaped, \"maybe a little redness\"    2. SIZE: \"How large is the swelling?\" (inches, cm or compare to coins)      1 - 1.5 inches in diametr    3. LOCATION: \"Where is the swelling located?\"      Right side of jaw, over jaw bone, near the ear.     4. ONSET: \"When did the swelling start?\"      Couple hours ago    5. PAIN: \"Is it painful?\" If so, ask: \"How much?\"      Tender to touch, has high pain tolerance.     6. ITCH: \"Does it itch?\" If so, ask: \"How much?\"      No    7. CAUSE: \"What do you think caused the swelling?\"      Unsure    8. OTHER SYMPTOMS: \"Do you have any other symptoms?\" (e.g., fever)      No other symptoms, no fever, chills. No tooth pain, can't see anything abnormal on inside of mouth.    Protocols used: SKIN LUMP OR LOCALIZED SWELLING-A-AH      "

## 2022-01-09 ENCOUNTER — HEALTH MAINTENANCE LETTER (OUTPATIENT)
Age: 42
End: 2022-01-09

## 2022-01-27 ENCOUNTER — MYC MEDICAL ADVICE (OUTPATIENT)
Dept: FAMILY MEDICINE | Facility: CLINIC | Age: 42
End: 2022-01-27
Payer: COMMERCIAL

## 2022-01-27 ENCOUNTER — OFFICE VISIT (OUTPATIENT)
Dept: FAMILY MEDICINE | Facility: CLINIC | Age: 42
End: 2022-01-27
Payer: COMMERCIAL

## 2022-01-27 VITALS
WEIGHT: 315 LBS | DIASTOLIC BLOOD PRESSURE: 75 MMHG | HEART RATE: 61 BPM | BODY MASS INDEX: 45.1 KG/M2 | TEMPERATURE: 98 F | OXYGEN SATURATION: 99 % | HEIGHT: 70 IN | RESPIRATION RATE: 16 BRPM | SYSTOLIC BLOOD PRESSURE: 123 MMHG

## 2022-01-27 DIAGNOSIS — Z98.890 S/P LEFT KNEE SURGERY: Primary | ICD-10-CM

## 2022-01-27 DIAGNOSIS — G47.33 OSA (OBSTRUCTIVE SLEEP APNEA): ICD-10-CM

## 2022-01-27 DIAGNOSIS — Z01.818 PREOP GENERAL PHYSICAL EXAM: ICD-10-CM

## 2022-01-27 DIAGNOSIS — M25.562 LEFT KNEE PAIN, UNSPECIFIED CHRONICITY: Primary | ICD-10-CM

## 2022-01-27 DIAGNOSIS — Z98.890 STATUS POST ANTERIOR CRUCIATE LIGAMENT SURGERY: ICD-10-CM

## 2022-01-27 DIAGNOSIS — E66.01 MORBID OBESITY DUE TO EXCESS CALORIES (H): ICD-10-CM

## 2022-01-27 LAB — HGB BLD-MCNC: 15.6 G/DL (ref 13.3–17.7)

## 2022-01-27 PROCEDURE — 85018 HEMOGLOBIN: CPT | Performed by: PHYSICIAN ASSISTANT

## 2022-01-27 PROCEDURE — 99214 OFFICE O/P EST MOD 30 MIN: CPT | Performed by: PHYSICIAN ASSISTANT

## 2022-01-27 PROCEDURE — 36415 COLL VENOUS BLD VENIPUNCTURE: CPT | Performed by: PHYSICIAN ASSISTANT

## 2022-01-27 ASSESSMENT — MIFFLIN-ST. JEOR: SCORE: 2525.13

## 2022-01-27 ASSESSMENT — PAIN SCALES - GENERAL: PAINLEVEL: MILD PAIN (2)

## 2022-01-27 NOTE — H&P (VIEW-ONLY)
[address] [facility], [address]  [facility] [address]  Phone: [phone]  Primary Provider: José Kan  Pre-op Performing Provider: JACKIE DE LA VEGA PA-C      PREOPERATIVE EVALUATION:  Today's date: 1/27/2022    Lj Lamas is a 41 year old male who presents for a preoperative evaluation.    Surgical Information:  Surgery/Procedure: Left knee arthroscopy, synovial biopsy, mass excision  Surgery Location: [facility]  Surgeon: Dr. Jarod Meyers  Surgery Date: 2/2/2022  Time of Surgery: 10:30  Where patient plans to recover: At home with family  Fax number for surgical facility: Note does not need to be faxed, will be available electronically in Epic.    Type of Anesthesia Anticipated: General      Subjective     HPI related to upcoming procedure: L knee instability and pain starting at the end of dec with patella shifting out of place at times.    Preop Questions 1/27/2022   1. Have you ever had a heart attack or stroke? No   2. Have you ever had surgery on your heart or blood vessels, such as a stent placement, a coronary artery bypass, or surgery on an artery in your head, neck, heart, or legs? No   3. Do you have chest pain with activity? No   4. Do you have a history of  heart failure? No   5. Do you currently have a cold, bronchitis or symptoms of other infection? No   6. Do you have a cough, shortness of breath, or wheezing? No   7. Do you or anyone in your family have previous history of blood clots? No   8. Do you or does anyone in your family have a serious bleeding problem such as prolonged bleeding following surgeries or cuts? No   9. Have you ever had problems with anemia or been told to take iron pills? No   10. Have you had any abnormal blood loss such as black, tarry or bloody stools? No   11. Have you ever had a blood transfusion? No   12. Are you willing to have a blood transfusion if it is medically needed before,  during, or after your surgery? Yes   13. Have you or any of your relatives ever had problems with anesthesia? YES - pt vomiting following surgery   14. Do you have sleep apnea, excessive snoring or daytime drowsiness? YES - KHUSHBU, uses CPAP   14a. Do you have a CPAP machine? Yes   15. Do you have any artifical heart valves or other implanted medical devices like a pacemaker, defibrillator, or continuous glucose monitor? No   16. Do you have artificial joints? No   17. Are you allergic to latex? No       Health Care Directive:  Patient does not have a Health Care Directive or Living Will:NO  Status of Chronic Conditions:  KHUSHBU      Review of Systems  CONSTITUTIONAL: NEGATIVE for fever, chills, change in weight  ENT/MOUTH: NEGATIVE for ear, mouth and throat problems  RESP: NEGATIVE for significant cough or SOB  CV: NEGATIVE for chest pain, palpitations or peripheral edema    Patient Active Problem List    Diagnosis Date Noted     Trigeminal neuralgia 07/19/2018     Priority: Medium     Status post anterior cruciate ligament surgery 05/23/2018     Priority: Medium     Dysphagia, unspecified type 03/22/2018     Priority: Medium     Morbid obesity due to excess calories (H) 02/09/2017     Priority: Medium     Venous (peripheral) insufficiency 02/09/2017     Priority: Medium     Obstructive sleep apnea syndrome 11/14/2015     Priority: Medium     Overview:   PSG 11/2015 showed AHI 5.2/hr        Past Medical History:   Diagnosis Date     Breen's esophagus without dysplasia      Gastroesophageal reflux disease      Obese      Other chronic pain     low back pain - compressed vertebrae     Sleep apnea     mild - does not use CPAP     Venous (peripheral) insufficiency 2/9/2017     Past Surgical History:   Procedure Laterality Date     ARTHROSCOPIC  RECONSTRUCTION/REPAIR LATERAL LIGAMENT KNEE Left 5/23/2018    Procedure: ARTHROSCOPIC  RECONSTRUCTION/REPAIR LATERAL LIGAMENT KNEE;;  Surgeon: Jarod Meyers MD;  [facility]     ARTHROSCOPIC RECONSTRUCTION ANTERIOR CRUCIATE LIGAMENT Left 5/23/2018    Procedure: ARTHROSCOPIC RECONSTRUCTION ANTERIOR CRUCIATE LIGAMENT;  Left Knee  Arthroscopic Anterior Cruciate Ligament Reconstruction with Hamstring Allograft, Fibular Colateral Ligament Reconstruction with Allograft, and Peroneal Nerve Neurolysis;  Surgeon: Jarod Meyers MD;  [facility]     ESOPHAGOSCOPY, GASTROSCOPY, DUODENOSCOPY (EGD), COMBINED N/A 3/28/2018    Procedure: COMBINED ESOPHAGOSCOPY, GASTROSCOPY, DUODENOSCOPY (EGD);  egd;  Surgeon: Charbel Wise MD;  [facility]     ESOPHAGOSCOPY, GASTROSCOPY, DUODENOSCOPY (EGD), COMBINED N/A 8/15/2018    Procedure: COMBINED ESOPHAGOSCOPY, GASTROSCOPY, DUODENOSCOPY (EGD), BIOPSY SINGLE OR MULTIPLE;  Upper Endo;  Surgeon: Charbel Wise MD;  [facility]     ESOPHAGOSCOPY, GASTROSCOPY, DUODENOSCOPY (EGD), COMBINED N/A 10/6/2021    Procedure: ESOPHAGOGASTRODUODENOSCOPY, WITH BIOPSY;  Surgeon: Charbel Wise MD;  Location: [facility]     NO HISTORY OF SURGERY       Current Outpatient Medications   Medication Sig Dispense Refill     fluticasone (FLONASE) 50 MCG/ACT nasal spray Spray 1 spray into both nostrils daily       LANsoprazole (PREVACID) 30 MG DR capsule TAKE 1 CAPSULE BY MOUTH EVERY DAY 90 capsule 4     levocetirizine (XYZAL) 5 MG tablet Take 5 mg by mouth every evening       Multiple Vitamin (MULTIVITAMIN PO)        VITAMIN D PO          No Known Allergies     Social History     Tobacco Use     Smoking status: Never Smoker     Smokeless tobacco: Never Used     Tobacco comment: occass cigars in [employer]   Substance Use Topics     Alcohol use: Yes     Alcohol/week: 0.0 standard drinks     Comment: 1-2 drinks per month     Family History   Problem Relation Age of Onset     Diabetes Mother      Heart Disease Father      Diabetes Maternal Grandfather      Lung Cancer Maternal Grandfather      Bone Cancer Maternal Grandfather      Cancer Paternal  "Grandfather      History   Drug Use No         Objective     /75 (BP Location: Right arm, Patient Position: Chair, Cuff Size: Adult Large)   Pulse 61   Temp 98  F (36.7  C) (Temporal)   Resp 16   Ht 1.778 m (5' 10\")   Wt (!) 162.4 kg (358 lb)   SpO2 99%   BMI 51.37 kg/m      Physical Exam  GENERAL APPEARANCE:alert and no distress  HENT: ear canals and TM's normal and nose and mouth without ulcers or lesions  RESP: lungs clear to auscultation - no rales, rhonchi or wheezes  CV: regular rate and rhythm, normal S1 S2, no S3 or S4 and no murmur, click or rub   ABDOMEN: soft, nontender, no HSM or masses and bowel sounds normal  NEURO: Normal strength and tone, sensory exam grossly normal, mentation intact and speech normal    Diagnostics:     Results for orders placed or performed in visit on 01/27/22   Hemoglobin     Status: Normal   Result Value Ref Range    Hemoglobin 15.6 13.3 - 17.7 g/dL         Assessment and Plan:     (M25.562) Left knee pain, unspecified chronicity  (primary encounter diagnosis)  Comment:   Plan: 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  Pt medically cleared for surgery as planned. Pt is not on any blood thinners. Can take prevacid the am of surgery with a sip of water.    (Z01.818) Preop general physical exam  Comment:   Plan: he has appt for his covid test on Saturday    (E66.01) Morbid obesity due to excess calories (H)  Comment:   Plan:     (Z98.890) Status post anterior cruciate ligament surgery  Comment:   Plan:     (G47.33) KHUSHBU (obstructive sleep apnea)  Comment:   Plan: uses cpap           Signed Electronically by: Brinda Rehman PA-C  Copy of this evaluation report is provided to requesting physician.      "

## 2022-01-27 NOTE — PROGRESS NOTES
[address] [facility], [address]  [facility] [address]  Phone: [phone]  Primary Provider: José Kan  Pre-op Performing Provider: JACKIE DE LA VEGA PA-C      PREOPERATIVE EVALUATION:  Today's date: 1/27/2022    Lj Lamas is a 41 year old male who presents for a preoperative evaluation.    Surgical Information:  Surgery/Procedure: Left knee arthroscopy, synovial biopsy, mass excision  Surgery Location: [facility]  Surgeon: Dr. Jarod Meyers  Surgery Date: 2/2/2022  Time of Surgery: 10:30  Where patient plans to recover: At home with family  Fax number for surgical facility: Note does not need to be faxed, will be available electronically in Epic.    Type of Anesthesia Anticipated: General      Subjective     HPI related to upcoming procedure: L knee instability and pain starting at the end of dec with patella shifting out of place at times.    Preop Questions 1/27/2022   1. Have you ever had a heart attack or stroke? No   2. Have you ever had surgery on your heart or blood vessels, such as a stent placement, a coronary artery bypass, or surgery on an artery in your head, neck, heart, or legs? No   3. Do you have chest pain with activity? No   4. Do you have a history of  heart failure? No   5. Do you currently have a cold, bronchitis or symptoms of other infection? No   6. Do you have a cough, shortness of breath, or wheezing? No   7. Do you or anyone in your family have previous history of blood clots? No   8. Do you or does anyone in your family have a serious bleeding problem such as prolonged bleeding following surgeries or cuts? No   9. Have you ever had problems with anemia or been told to take iron pills? No   10. Have you had any abnormal blood loss such as black, tarry or bloody stools? No   11. Have you ever had a blood transfusion? No   12. Are you willing to have a blood transfusion if it is medically needed before,  during, or after your surgery? Yes   13. Have you or any of your relatives ever had problems with anesthesia? YES - pt vomiting following surgery   14. Do you have sleep apnea, excessive snoring or daytime drowsiness? YES - KHUSHBU, uses CPAP   14a. Do you have a CPAP machine? Yes   15. Do you have any artifical heart valves or other implanted medical devices like a pacemaker, defibrillator, or continuous glucose monitor? No   16. Do you have artificial joints? No   17. Are you allergic to latex? No       Health Care Directive:  Patient does not have a Health Care Directive or Living Will:NO  Status of Chronic Conditions:  KHUSHBU      Review of Systems  CONSTITUTIONAL: NEGATIVE for fever, chills, change in weight  ENT/MOUTH: NEGATIVE for ear, mouth and throat problems  RESP: NEGATIVE for significant cough or SOB  CV: NEGATIVE for chest pain, palpitations or peripheral edema    Patient Active Problem List    Diagnosis Date Noted     Trigeminal neuralgia 07/19/2018     Priority: Medium     Status post anterior cruciate ligament surgery 05/23/2018     Priority: Medium     Dysphagia, unspecified type 03/22/2018     Priority: Medium     Morbid obesity due to excess calories (H) 02/09/2017     Priority: Medium     Venous (peripheral) insufficiency 02/09/2017     Priority: Medium     Obstructive sleep apnea syndrome 11/14/2015     Priority: Medium     Overview:   PSG 11/2015 showed AHI 5.2/hr        Past Medical History:   Diagnosis Date     Breen's esophagus without dysplasia      Gastroesophageal reflux disease      Obese      Other chronic pain     low back pain - compressed vertebrae     Sleep apnea     mild - does not use CPAP     Venous (peripheral) insufficiency 2/9/2017     Past Surgical History:   Procedure Laterality Date     ARTHROSCOPIC  RECONSTRUCTION/REPAIR LATERAL LIGAMENT KNEE Left 5/23/2018    Procedure: ARTHROSCOPIC  RECONSTRUCTION/REPAIR LATERAL LIGAMENT KNEE;;  Surgeon: Jarod Meyers MD;  [facility]     ARTHROSCOPIC RECONSTRUCTION ANTERIOR CRUCIATE LIGAMENT Left 5/23/2018    Procedure: ARTHROSCOPIC RECONSTRUCTION ANTERIOR CRUCIATE LIGAMENT;  Left Knee  Arthroscopic Anterior Cruciate Ligament Reconstruction with Hamstring Allograft, Fibular Colateral Ligament Reconstruction with Allograft, and Peroneal Nerve Neurolysis;  Surgeon: Jarod Meyers MD;  [facility]     ESOPHAGOSCOPY, GASTROSCOPY, DUODENOSCOPY (EGD), COMBINED N/A 3/28/2018    Procedure: COMBINED ESOPHAGOSCOPY, GASTROSCOPY, DUODENOSCOPY (EGD);  egd;  Surgeon: Charbel Wise MD;  [facility]     ESOPHAGOSCOPY, GASTROSCOPY, DUODENOSCOPY (EGD), COMBINED N/A 8/15/2018    Procedure: COMBINED ESOPHAGOSCOPY, GASTROSCOPY, DUODENOSCOPY (EGD), BIOPSY SINGLE OR MULTIPLE;  Upper Endo;  Surgeon: Charbel Wise MD;  [facility]     ESOPHAGOSCOPY, GASTROSCOPY, DUODENOSCOPY (EGD), COMBINED N/A 10/6/2021    Procedure: ESOPHAGOGASTRODUODENOSCOPY, WITH BIOPSY;  Surgeon: Charbel Wise MD;  [facility]     NO HISTORY OF SURGERY       Current Outpatient Medications   Medication Sig Dispense Refill     fluticasone (FLONASE) 50 MCG/ACT nasal spray Spray 1 spray into both nostrils daily       LANsoprazole (PREVACID) 30 MG DR capsule TAKE 1 CAPSULE BY MOUTH EVERY DAY 90 capsule 4     levocetirizine (XYZAL) 5 MG tablet Take 5 mg by mouth every evening       Multiple Vitamin (MULTIVITAMIN PO)        VITAMIN D PO          No Known Allergies     Social History     Tobacco Use     Smoking status: Never Smoker     Smokeless tobacco: Never Used     Tobacco comment: occass cigars in [employer]   Substance Use Topics     Alcohol use: Yes     Alcohol/week: 0.0 standard drinks     Comment: 1-2 drinks per month     Family History   Problem Relation Age of Onset     Diabetes Mother      Heart Disease Father      Diabetes Maternal Grandfather      Lung Cancer Maternal Grandfather      Bone Cancer Maternal Grandfather      Cancer Paternal  "Grandfather      History   Drug Use No         Objective     /75 (BP Location: Right arm, Patient Position: Chair, Cuff Size: Adult Large)   Pulse 61   Temp 98  F (36.7  C) (Temporal)   Resp 16   Ht 1.778 m (5' 10\")   Wt (!) 162.4 kg (358 lb)   SpO2 99%   BMI 51.37 kg/m      Physical Exam  GENERAL APPEARANCE:alert and no distress  HENT: ear canals and TM's normal and nose and mouth without ulcers or lesions  RESP: lungs clear to auscultation - no rales, rhonchi or wheezes  CV: regular rate and rhythm, normal S1 S2, no S3 or S4 and no murmur, click or rub   ABDOMEN: soft, nontender, no HSM or masses and bowel sounds normal  NEURO: Normal strength and tone, sensory exam grossly normal, mentation intact and speech normal    Diagnostics:     Results for orders placed or performed in visit on 01/27/22   Hemoglobin     Status: Normal   Result Value Ref Range    Hemoglobin 15.6 13.3 - 17.7 g/dL         Assessment and Plan:     (M25.562) Left knee pain, unspecified chronicity  (primary encounter diagnosis)  Comment:   Plan: 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  Pt medically cleared for surgery as planned. Pt is not on any blood thinners. Can take prevacid the am of surgery with a sip of water.    (Z01.818) Preop general physical exam  Comment:   Plan: he has appt for his covid test on Saturday    (E66.01) Morbid obesity due to excess calories (H)  Comment:   Plan:     (Z98.890) Status post anterior cruciate ligament surgery  Comment:   Plan:     (G47.33) KHUSHBU (obstructive sleep apnea)  Comment:   Plan: uses cpap           Signed Electronically by: Brinda Rehman PA-C  Copy of this evaluation report is provided to requesting physician.      "

## 2022-01-29 ENCOUNTER — LAB (OUTPATIENT)
Dept: FAMILY MEDICINE | Facility: CLINIC | Age: 42
End: 2022-01-29
Attending: ORTHOPAEDIC SURGERY
Payer: COMMERCIAL

## 2022-01-29 DIAGNOSIS — Z98.890 S/P LEFT KNEE SURGERY: ICD-10-CM

## 2022-01-29 PROCEDURE — U0003 INFECTIOUS AGENT DETECTION BY NUCLEIC ACID (DNA OR RNA); SEVERE ACUTE RESPIRATORY SYNDROME CORONAVIRUS 2 (SARS-COV-2) (CORONAVIRUS DISEASE [COVID-19]), AMPLIFIED PROBE TECHNIQUE, MAKING USE OF HIGH THROUGHPUT TECHNOLOGIES AS DESCRIBED BY CMS-2020-01-R: HCPCS

## 2022-01-29 PROCEDURE — U0005 INFEC AGEN DETEC AMPLI PROBE: HCPCS

## 2022-01-31 LAB — SARS-COV-2 RNA RESP QL NAA+PROBE: NEGATIVE

## 2022-02-01 ENCOUNTER — ANESTHESIA EVENT (OUTPATIENT)
Dept: SURGERY | Facility: CLINIC | Age: 42
End: 2022-02-01
Payer: COMMERCIAL

## 2022-02-01 ASSESSMENT — LIFESTYLE VARIABLES: TOBACCO_USE: 0

## 2022-02-02 ENCOUNTER — ANESTHESIA (OUTPATIENT)
Dept: SURGERY | Facility: CLINIC | Age: 42
End: 2022-02-02
Payer: COMMERCIAL

## 2022-02-02 ENCOUNTER — HOSPITAL ENCOUNTER (OUTPATIENT)
Facility: CLINIC | Age: 42
Discharge: HOME | End: 2022-02-02
Attending: ORTHOPAEDIC SURGERY | Admitting: ORTHOPAEDIC SURGERY
Payer: COMMERCIAL

## 2022-02-02 VITALS
DIASTOLIC BLOOD PRESSURE: 66 MMHG | SYSTOLIC BLOOD PRESSURE: 112 MMHG | HEART RATE: 67 BPM | HEIGHT: 70 IN | TEMPERATURE: 97.3 F | BODY MASS INDEX: 45.1 KG/M2 | OXYGEN SATURATION: 97 % | WEIGHT: 315 LBS | RESPIRATION RATE: 15 BRPM

## 2022-02-02 DIAGNOSIS — G89.29 CHRONIC PAIN OF LEFT KNEE: Primary | ICD-10-CM

## 2022-02-02 DIAGNOSIS — M25.562 CHRONIC PAIN OF LEFT KNEE: Primary | ICD-10-CM

## 2022-02-02 LAB — GLUCOSE BLDC GLUCOMTR-MCNC: 95 MG/DL (ref 70–99)

## 2022-02-02 PROCEDURE — 360N000076 HC SURGERY LEVEL 3, PER MIN: Performed by: ORTHOPAEDIC SURGERY

## 2022-02-02 PROCEDURE — 250N000011 HC RX IP 250 OP 636: Performed by: ANESTHESIOLOGY

## 2022-02-02 PROCEDURE — 710N000012 HC RECOVERY PHASE 2, PER MINUTE: Performed by: ORTHOPAEDIC SURGERY

## 2022-02-02 PROCEDURE — 29881 ARTHRS KNE SRG MNISECTMY M/L: CPT | Mod: LT | Performed by: ORTHOPAEDIC SURGERY

## 2022-02-02 PROCEDURE — 999N000141 HC STATISTIC PRE-PROCEDURE NURSING ASSESSMENT: Performed by: ORTHOPAEDIC SURGERY

## 2022-02-02 PROCEDURE — 250N000009 HC RX 250: Performed by: STUDENT IN AN ORGANIZED HEALTH CARE EDUCATION/TRAINING PROGRAM

## 2022-02-02 PROCEDURE — 250N000013 HC RX MED GY IP 250 OP 250 PS 637: Performed by: PHYSICIAN ASSISTANT

## 2022-02-02 PROCEDURE — 258N000003 HC RX IP 258 OP 636: Performed by: STUDENT IN AN ORGANIZED HEALTH CARE EDUCATION/TRAINING PROGRAM

## 2022-02-02 PROCEDURE — 88307 TISSUE EXAM BY PATHOLOGIST: CPT | Mod: 26 | Performed by: PATHOLOGY

## 2022-02-02 PROCEDURE — 250N000011 HC RX IP 250 OP 636: Performed by: ORTHOPAEDIC SURGERY

## 2022-02-02 PROCEDURE — 710N000009 HC RECOVERY PHASE 1, LEVEL 1, PER MIN: Performed by: ORTHOPAEDIC SURGERY

## 2022-02-02 PROCEDURE — 250N000011 HC RX IP 250 OP 636: Performed by: STUDENT IN AN ORGANIZED HEALTH CARE EDUCATION/TRAINING PROGRAM

## 2022-02-02 PROCEDURE — 370N000017 HC ANESTHESIA TECHNICAL FEE, PER MIN: Performed by: ORTHOPAEDIC SURGERY

## 2022-02-02 PROCEDURE — 272N000001 HC OR GENERAL SUPPLY STERILE: Performed by: ORTHOPAEDIC SURGERY

## 2022-02-02 PROCEDURE — 29876 ARTHRS KNEE SURG SYNVCT MAJ: CPT | Mod: LT | Performed by: ORTHOPAEDIC SURGERY

## 2022-02-02 PROCEDURE — 258N000001 HC RX 258: Performed by: ORTHOPAEDIC SURGERY

## 2022-02-02 PROCEDURE — 82962 GLUCOSE BLOOD TEST: CPT

## 2022-02-02 PROCEDURE — 88307 TISSUE EXAM BY PATHOLOGIST: CPT | Mod: TC | Performed by: ORTHOPAEDIC SURGERY

## 2022-02-02 PROCEDURE — 250N000009 HC RX 250: Performed by: ORTHOPAEDIC SURGERY

## 2022-02-02 PROCEDURE — 250N000011 HC RX IP 250 OP 636: Performed by: PHYSICIAN ASSISTANT

## 2022-02-02 RX ORDER — BUPIVACAINE HYDROCHLORIDE AND EPINEPHRINE 2.5; 5 MG/ML; UG/ML
INJECTION, SOLUTION INFILTRATION; PERINEURAL PRN
Status: DISCONTINUED | OUTPATIENT
Start: 2022-02-02 | End: 2022-02-02 | Stop reason: HOSPADM

## 2022-02-02 RX ORDER — HYDRALAZINE HYDROCHLORIDE 20 MG/ML
2.5-5 INJECTION INTRAMUSCULAR; INTRAVENOUS EVERY 10 MIN PRN
Status: DISCONTINUED | OUTPATIENT
Start: 2022-02-02 | End: 2022-02-02 | Stop reason: HOSPADM

## 2022-02-02 RX ORDER — EPHEDRINE SULFATE 50 MG/ML
INJECTION, SOLUTION INTRAMUSCULAR; INTRAVENOUS; SUBCUTANEOUS PRN
Status: DISCONTINUED | OUTPATIENT
Start: 2022-02-02 | End: 2022-02-02

## 2022-02-02 RX ORDER — ACETAMINOPHEN 325 MG/1
650 TABLET ORAL
Status: DISCONTINUED | OUTPATIENT
Start: 2022-02-02 | End: 2022-02-02 | Stop reason: HOSPADM

## 2022-02-02 RX ORDER — OXYCODONE HYDROCHLORIDE 5 MG/1
5-10 TABLET ORAL EVERY 4 HOURS PRN
Qty: 10 TABLET | Refills: 0 | Status: SHIPPED | OUTPATIENT
Start: 2022-02-02

## 2022-02-02 RX ORDER — OXYCODONE HYDROCHLORIDE 5 MG/1
5 TABLET ORAL
Status: COMPLETED | OUTPATIENT
Start: 2022-02-02 | End: 2022-02-02

## 2022-02-02 RX ORDER — SODIUM CHLORIDE, SODIUM LACTATE, POTASSIUM CHLORIDE, CALCIUM CHLORIDE 600; 310; 30; 20 MG/100ML; MG/100ML; MG/100ML; MG/100ML
INJECTION, SOLUTION INTRAVENOUS CONTINUOUS
Status: DISCONTINUED | OUTPATIENT
Start: 2022-02-02 | End: 2022-02-02 | Stop reason: HOSPADM

## 2022-02-02 RX ORDER — PROPOFOL 10 MG/ML
INJECTION, EMULSION INTRAVENOUS PRN
Status: DISCONTINUED | OUTPATIENT
Start: 2022-02-02 | End: 2022-02-02

## 2022-02-02 RX ORDER — LIDOCAINE HYDROCHLORIDE 20 MG/ML
INJECTION, SOLUTION INFILTRATION; PERINEURAL PRN
Status: DISCONTINUED | OUTPATIENT
Start: 2022-02-02 | End: 2022-02-02

## 2022-02-02 RX ORDER — SODIUM CHLORIDE, SODIUM LACTATE, POTASSIUM CHLORIDE, CALCIUM CHLORIDE 600; 310; 30; 20 MG/100ML; MG/100ML; MG/100ML; MG/100ML
INJECTION, SOLUTION INTRAVENOUS CONTINUOUS PRN
Status: DISCONTINUED | OUTPATIENT
Start: 2022-02-02 | End: 2022-02-02

## 2022-02-02 RX ORDER — PROPOFOL 10 MG/ML
INJECTION, EMULSION INTRAVENOUS CONTINUOUS PRN
Status: DISCONTINUED | OUTPATIENT
Start: 2022-02-02 | End: 2022-02-02

## 2022-02-02 RX ORDER — METOPROLOL TARTRATE 1 MG/ML
1-2 INJECTION, SOLUTION INTRAVENOUS EVERY 5 MIN PRN
Status: DISCONTINUED | OUTPATIENT
Start: 2022-02-02 | End: 2022-02-02 | Stop reason: HOSPADM

## 2022-02-02 RX ORDER — FENTANYL CITRATE 50 UG/ML
50 INJECTION, SOLUTION INTRAMUSCULAR; INTRAVENOUS ONCE
Status: COMPLETED | OUTPATIENT
Start: 2022-02-02 | End: 2022-02-02

## 2022-02-02 RX ORDER — CEFAZOLIN SODIUM IN 0.9 % NACL 3 G/100 ML
3 INTRAVENOUS SOLUTION, PIGGYBACK (ML) INTRAVENOUS SEE ADMIN INSTRUCTIONS
Status: DISCONTINUED | OUTPATIENT
Start: 2022-02-02 | End: 2022-02-02 | Stop reason: HOSPADM

## 2022-02-02 RX ORDER — FENTANYL CITRATE 50 UG/ML
INJECTION, SOLUTION INTRAMUSCULAR; INTRAVENOUS PRN
Status: DISCONTINUED | OUTPATIENT
Start: 2022-02-02 | End: 2022-02-02

## 2022-02-02 RX ORDER — HYDROMORPHONE HCL IN WATER/PF 6 MG/30 ML
0.2 PATIENT CONTROLLED ANALGESIA SYRINGE INTRAVENOUS EVERY 5 MIN PRN
Status: DISCONTINUED | OUTPATIENT
Start: 2022-02-02 | End: 2022-02-02 | Stop reason: HOSPADM

## 2022-02-02 RX ORDER — ONDANSETRON 2 MG/ML
4 INJECTION INTRAMUSCULAR; INTRAVENOUS EVERY 30 MIN PRN
Status: DISCONTINUED | OUTPATIENT
Start: 2022-02-02 | End: 2022-02-02 | Stop reason: HOSPADM

## 2022-02-02 RX ORDER — ONDANSETRON 4 MG/1
4 TABLET, ORALLY DISINTEGRATING ORAL EVERY 30 MIN PRN
Status: DISCONTINUED | OUTPATIENT
Start: 2022-02-02 | End: 2022-02-02 | Stop reason: HOSPADM

## 2022-02-02 RX ORDER — CEFAZOLIN SODIUM IN 0.9 % NACL 3 G/100 ML
3 INTRAVENOUS SOLUTION, PIGGYBACK (ML) INTRAVENOUS
Status: COMPLETED | OUTPATIENT
Start: 2022-02-02 | End: 2022-02-02

## 2022-02-02 RX ORDER — DEXAMETHASONE SODIUM PHOSPHATE 4 MG/ML
INJECTION, SOLUTION INTRA-ARTICULAR; INTRALESIONAL; INTRAMUSCULAR; INTRAVENOUS; SOFT TISSUE PRN
Status: DISCONTINUED | OUTPATIENT
Start: 2022-02-02 | End: 2022-02-02

## 2022-02-02 RX ORDER — ONDANSETRON 2 MG/ML
INJECTION INTRAMUSCULAR; INTRAVENOUS PRN
Status: DISCONTINUED | OUTPATIENT
Start: 2022-02-02 | End: 2022-02-02

## 2022-02-02 RX ORDER — MEPERIDINE HYDROCHLORIDE 25 MG/ML
12.5 INJECTION INTRAMUSCULAR; INTRAVENOUS; SUBCUTANEOUS
Status: DISCONTINUED | OUTPATIENT
Start: 2022-02-02 | End: 2022-02-02 | Stop reason: HOSPADM

## 2022-02-02 RX ORDER — FENTANYL CITRATE-0.9 % NACL/PF 10 MCG/ML
PLASTIC BAG, INJECTION (ML) INTRAVENOUS PRN
Status: DISCONTINUED | OUTPATIENT
Start: 2022-02-02 | End: 2022-02-02

## 2022-02-02 RX ORDER — OXYCODONE HYDROCHLORIDE 5 MG/1
5 TABLET ORAL EVERY 4 HOURS PRN
Status: DISCONTINUED | OUTPATIENT
Start: 2022-02-02 | End: 2022-02-02 | Stop reason: HOSPADM

## 2022-02-02 RX ORDER — CEFAZOLIN SODIUM IN 0.9 % NACL 3 G/100 ML
3 INTRAVENOUS SOLUTION, PIGGYBACK (ML) INTRAVENOUS
Status: DISCONTINUED | OUTPATIENT
Start: 2022-02-02 | End: 2022-02-02 | Stop reason: HOSPADM

## 2022-02-02 RX ORDER — FENTANYL CITRATE 50 UG/ML
25 INJECTION, SOLUTION INTRAMUSCULAR; INTRAVENOUS EVERY 5 MIN PRN
Status: DISCONTINUED | OUTPATIENT
Start: 2022-02-02 | End: 2022-02-02 | Stop reason: HOSPADM

## 2022-02-02 RX ORDER — FENTANYL CITRATE 50 UG/ML
25 INJECTION, SOLUTION INTRAMUSCULAR; INTRAVENOUS
Status: DISCONTINUED | OUTPATIENT
Start: 2022-02-02 | End: 2022-02-02 | Stop reason: HOSPADM

## 2022-02-02 RX ORDER — ONDANSETRON 4 MG/1
4-8 TABLET, ORALLY DISINTEGRATING ORAL EVERY 8 HOURS PRN
Qty: 10 TABLET | Refills: 0 | Status: SHIPPED | OUTPATIENT
Start: 2022-02-02

## 2022-02-02 RX ADMIN — ONDANSETRON 4 MG: 2 INJECTION INTRAMUSCULAR; INTRAVENOUS at 11:56

## 2022-02-02 RX ADMIN — PROPOFOL 100 MG: 10 INJECTION, EMULSION INTRAVENOUS at 11:15

## 2022-02-02 RX ADMIN — FENTANYL CITRATE 50 MCG: 50 INJECTION, SOLUTION INTRAMUSCULAR; INTRAVENOUS at 11:36

## 2022-02-02 RX ADMIN — ACETAMINOPHEN 650 MG: 325 TABLET, FILM COATED ORAL at 13:14

## 2022-02-02 RX ADMIN — Medication 5 MG: at 11:32

## 2022-02-02 RX ADMIN — Medication 3 G: at 11:19

## 2022-02-02 RX ADMIN — DEXAMETHASONE SODIUM PHOSPHATE 8 MG: 4 INJECTION, SOLUTION INTRAMUSCULAR; INTRAVENOUS at 11:19

## 2022-02-02 RX ADMIN — FENTANYL CITRATE 50 MCG: 50 INJECTION, SOLUTION INTRAMUSCULAR; INTRAVENOUS at 11:09

## 2022-02-02 RX ADMIN — PROPOFOL 200 MCG/KG/MIN: 10 INJECTION, EMULSION INTRAVENOUS at 11:13

## 2022-02-02 RX ADMIN — Medication 100 MCG: at 11:30

## 2022-02-02 RX ADMIN — Medication 5 MG: at 11:41

## 2022-02-02 RX ADMIN — Medication 100 MCG: at 11:44

## 2022-02-02 RX ADMIN — Medication 5 MG: at 11:34

## 2022-02-02 RX ADMIN — PROPOFOL 200 MG: 10 INJECTION, EMULSION INTRAVENOUS at 11:12

## 2022-02-02 RX ADMIN — OXYCODONE HYDROCHLORIDE 5 MG: 5 TABLET ORAL at 13:14

## 2022-02-02 RX ADMIN — Medication 100 MCG: at 11:34

## 2022-02-02 RX ADMIN — PHENYLEPHRINE HYDROCHLORIDE 0.4 MCG/KG/MIN: 10 INJECTION INTRAVENOUS at 11:47

## 2022-02-02 RX ADMIN — Medication 5 MG: at 11:44

## 2022-02-02 RX ADMIN — FENTANYL CITRATE 50 MCG: 50 INJECTION INTRAMUSCULAR; INTRAVENOUS at 12:43

## 2022-02-02 RX ADMIN — Medication 100 MCG: at 11:41

## 2022-02-02 RX ADMIN — Medication 50 MCG: at 11:27

## 2022-02-02 RX ADMIN — LIDOCAINE HYDROCHLORIDE 100 MG: 20 INJECTION, SOLUTION INFILTRATION; PERINEURAL at 11:09

## 2022-02-02 RX ADMIN — SODIUM CHLORIDE, SODIUM LACTATE, POTASSIUM CHLORIDE, CALCIUM CHLORIDE: 600; 310; 30; 20 INJECTION, SOLUTION INTRAVENOUS at 11:05

## 2022-02-02 RX ADMIN — Medication 100 MCG: at 11:38

## 2022-02-02 RX ADMIN — PROPOFOL 30 MG: 10 INJECTION, EMULSION INTRAVENOUS at 11:36

## 2022-02-02 ASSESSMENT — MIFFLIN-ST. JEOR: SCORE: 2511.25

## 2022-02-02 NOTE — ANESTHESIA CARE TRANSFER NOTE
Patient: Lj Lamas    Procedure: Procedure(s):  Left knee arthroscopy, synovial biopsy, partial lateral meniscectomy, chondraplasty       Diagnosis: Knee mass, left [R22.42]  Diagnosis Additional Information: No value filed.    Anesthesia Type:   General     Note:    Oropharynx: oropharynx clear of all foreign objects  Level of Consciousness: awake  Oxygen Supplementation: face mask  Level of Supplemental Oxygen (L/min / FiO2): 6  Independent Airway: airway patency satisfactory and stable  Dentition: dentition unchanged  Vital Signs Stable: post-procedure vital signs reviewed and stable  Report to RN Given: handoff report given  Patient transferred to: Phase II  Comments: VSS. Breathing spontaneously at a regular rate with adequate tidal volumes and maintaining O2 sats. Denies nausea or pain. No apparent complications from anesthesia.     Juan Pablo Smith MD  Anesthesia CA-1    Handoff Report: Identifed the Patient, Identified the Reponsible Provider, Reviewed the pertinent medical history, Discussed the surgical course, Reviewed Intra-OP anesthesia mangement and issues during anesthesia, Set expectations for post-procedure period and Allowed opportunity for questions and acknowledgement of understanding      Vitals:  Vitals Value Taken Time   /47 02/02/22 1216   Temp     Pulse 78 02/02/22 1219   Resp 16 02/02/22 1219   SpO2 100 % 02/02/22 1219   Vitals shown include unvalidated device data.    Electronically Signed By: Juan Pablo Smith MD  February 2, 2022  12:20 PM

## 2022-02-02 NOTE — ANESTHESIA PREPROCEDURE EVALUATION
Anesthesia Pre-Procedure Evaluation    Patient: Lj Lamas   MRN: [id number] : 1980        Preoperative Diagnosis: Knee mass, left [R22.42]    Procedure : Procedure(s):  Left knee arthroscopy, synovial biopsy          Past Medical History:   Diagnosis Date     Breen's esophagus without dysplasia      Gastroesophageal reflux disease      Obese      Obstructive sleep apnea syndrome      Other chronic pain     low back pain - compressed vertebrae     PONV (postoperative nausea and vomiting)      Sleep apnea     mild - does not use CPAP     Venous (peripheral) insufficiency 2017      Past Surgical History:   Procedure Laterality Date     ARTHROSCOPIC  RECONSTRUCTION/REPAIR LATERAL LIGAMENT KNEE Left 2018    Procedure: ARTHROSCOPIC  RECONSTRUCTION/REPAIR LATERAL LIGAMENT KNEE;;  Surgeon: Jarod Meyers MD;  [facility]     ARTHROSCOPIC RECONSTRUCTION ANTERIOR CRUCIATE LIGAMENT Left 2018    Procedure: ARTHROSCOPIC RECONSTRUCTION ANTERIOR CRUCIATE LIGAMENT;  Left Knee  Arthroscopic Anterior Cruciate Ligament Reconstruction with Hamstring Allograft, Fibular Colateral Ligament Reconstruction with Allograft, and Peroneal Nerve Neurolysis;  Surgeon: Jarod Meyers MD;  [facility]     ESOPHAGOSCOPY, GASTROSCOPY, DUODENOSCOPY (EGD), COMBINED N/A 3/28/2018    Procedure: COMBINED ESOPHAGOSCOPY, GASTROSCOPY, DUODENOSCOPY (EGD);  egd;  Surgeon: Charbel Wise MD;  [facility]     ESOPHAGOSCOPY, GASTROSCOPY, DUODENOSCOPY (EGD), COMBINED N/A 8/15/2018    Procedure: COMBINED ESOPHAGOSCOPY, GASTROSCOPY, DUODENOSCOPY (EGD), BIOPSY SINGLE OR MULTIPLE;  Upper Endo;  Surgeon: Charbel Wise MD;  [facility]     ESOPHAGOSCOPY, GASTROSCOPY, DUODENOSCOPY (EGD), COMBINED N/A 10/6/2021    Procedure: ESOPHAGOGASTRODUODENOSCOPY, WITH BIOPSY;  Surgeon: Charbel Wise MD;  [facility]     NO HISTORY OF SURGERY        Allergies   Allergen Reactions     Morphine  Nausea and Vomiting      Social History     Tobacco Use     Smoking status: Never Smoker     Smokeless tobacco: Never Used     Tobacco comment: occass cigars in [employer]   Substance Use Topics     Alcohol use: Yes     Alcohol/week: 0.0 standard drinks     Comment: 1-2 drinks per month      Wt Readings from Last 1 Encounters:   01/27/22 (!) 162.4 kg (358 lb)        Anesthesia Evaluation   Pt has had prior anesthetic.     History of anesthetic complications  - PONV.      ROS/MED HX  ENT/Pulmonary:     (+) sleep apnea, mild, uses CPAP,  (-) tobacco use and asthma   Neurologic: Comment: Trigeminal neuralgia  - neg neurologic ROS     Cardiovascular: Comment: - Venous insufficiency    (+) -----Previous cardiac testing   Echo: Date: 11/10/2020 Results:  Technically difficult imaging.  The left ventricle is normal in structure, function and size.  The visual ejection fraction is estimated at 60-65%.  The right ventricle is normal in structure, function and size.  Sinus rhythm was noted.  Doppler interrogation does not demonstrate significant stenosis or  insufficiency involving cardiac valves.  Stress Test: Date: Results:    ECG Reviewed: Date: 11/10/2020 Results:  SR, 68 bpm  Cath: Date: Results:      METS/Exercise Tolerance:     Hematologic:  - neg hematologic  ROS     Musculoskeletal: Comment: - Chronic low back pain      GI/Hepatic:     (+) GERD, Asymptomatic on medication, esophageal disease (Yariel's ; hx of dysphagia),     Renal/Genitourinary:  - neg Renal ROS     Endo:     (+) Obesity (BMI 51),     Psychiatric/Substance Use:  - neg psychiatric ROS     Infectious Disease:  - neg infectious disease ROS     Malignancy:  - neg malignancy ROS     Other:  - neg other ROS             OUTSIDE LABS:  CBC:   Lab Results   Component Value Date    WBC 9.2 10/30/2020    WBC 8.0 05/14/2018    HGB 15.6 01/27/2022    HGB 16.3 10/30/2020    HCT 47.3 10/30/2020    HCT 48.6 05/14/2018     10/30/2020     05/14/2018      BMP:   Lab Results   Component Value Date     12/18/2020     10/30/2020    POTASSIUM 4.5 12/18/2020    POTASSIUM 4.3 10/30/2020    CHLORIDE 106 12/18/2020    CHLORIDE 107 10/30/2020    CO2 27 12/18/2020    CO2 29 10/30/2020    BUN 13 12/18/2020    BUN 17 10/30/2020    CR 1.17 12/18/2020    CR 1.43 (H) 10/30/2020    GLC 97 12/18/2020    GLC 81 10/30/2020     COAGS: No results found for: PTT, INR, FIBR  POC: No results found for: BGM, HCG, HCGS  HEPATIC: No results found for: ALBUMIN, PROTTOTAL, ALT, AST, GGT, ALKPHOS, BILITOTAL, BILIDIRECT, RUTH  OTHER:   Lab Results   Component Value Date    A1C 5.0 05/14/2018    KEITH 9.2 12/18/2020    TSH 1.64 10/30/2020       Anesthesia Plan    ASA Status:  3      Anesthesia Type: General.     - Airway: ETT   Induction: Intravenous.   Maintenance: Inhalation.   Techniques and Equipment:     - Airway: Video-Laryngoscope     - Lines/Monitors: BIS     Consents         - Patient is DNR/DNI Status: No         Postoperative Care    Pain management: Oral pain medications.   PONV prophylaxis: Ondansetron (or other 5HT-3), Dexamethasone or Solumedrol, Background Propofol Infusion     Comments:                Juan Pablo Smith MD

## 2022-02-02 NOTE — ANESTHESIA PROCEDURE NOTES
Airway       Patient location during procedure: OR  Staff -        Anesthesiologist:  Brinda Burnett MD       Resident/Fellow: Juan Pablo Smith MD       Performed By: residentIndications and Patient Condition       Indications for airway management: carie-procedural       Induction type:intravenous       Mask difficulty assessment: 0 - not attempted    Final Airway Details       Final airway type: supraglottic airway    Supraglottic Airway Details        Type: LMA       Brand: Ambu AuraGain       LMA size: 5    Post intubation assessment        Placement verified by: capnometry, equal breath sounds and chest rise        Number of attempts at approach: 1       Secured by: none per MDA request.       Ease of procedure: easy       Dentition: Intact and Unchanged

## 2022-02-02 NOTE — OP NOTE
Procedure Date: 02/02/2022    PREOPERATIVE DIAGNOSES:  1.  Left knee intra-articular synovial mass.  2.  Left knee chondral wear.    POSTOPERATIVE DIAGNOSES:     1.  Left knee intra-articular synovial mass.  2.  Left knee chondral wear, patella.  3.  Left knee lateral meniscus tear.    SURGEON:  Jarod Meyers MD    ASSISTANT:  Jeremy Montero PA-C    No resident was available for assistance.  The physician assistant was necessary for patient positioning, portal closure and patient transportation.    SECOND ASSISTANT:  Arsalan Baig MD, Orthopedic Fellow    ANESTHETIC:  General.    DRAINS:  None.    COUNTS:  Sponge and needle count were correct.    MATERIAL FORWARDED TO THE LABORATORY:  None.    OPERATIONS PERFORMED:     1.  Left knee arthroscopic excisional biopsy.  2.  Left knee arthroscopic partial lateral meniscectomy.  3.  Left knee arthroscopic chondroplasty of patella.    INDICATIONS FOR PROCEDURE:  Conrado Lamas is a 41-year-old male who underwent a left ACL reconstruction and fibular collateral ligament reconstruction 05/24/2018.  He has done well until recently.  He has developed some mechanical symptoms and pain related to his patellofemoral joint.  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  Conrado and I had a long conversation about options.  We discussed nonoperative and operative intervention.  I did suggest to Conrado that it would be beneficial to obtain an excisional biopsy to confirm the diagnosis of the mass.  We would then debride any abnormal synovitis.  We discussed the risks of surgery, including bleeding, infection, nerve damage, complications from anesthesia, blood clot, etc.  We also discussed more pertinent risks such as failure to improve his symptoms.  We may make his symptoms worse.  We may not be able to make the diagnosis.  There is a possibility that this tumor could return.  There is a  possibility that his cartilage could continue to wear.  We may discover additional pathology.  Conrado had a chance to have his questions answered.  He understands the surgery, as well as the surgical risks, and would like to proceed.    OPERATIVE FINDINGS:  Examination under anesthesia revealed 10 degrees of hyperextension to 135 degrees of flexion, which is symmetrical.  He has a 1A Lachman.  No pivot shift.  He has no increased opening to valgus stress at 0 or 30 degrees.  He has no increased opening to varus stress at 0 degrees.  There may be 2 mm of increased opening to varus stress at 30 degrees.  No increased external rotation.  The diagnostic arthroscopy reveals a diffuse area of grade 2-3 chondral wear on the central ridge and medial facet of the patella.  The trochlea is intact.  The medial gutter is normal.  Within the lateral gutter, there is a large synovial mass.  He does have hemosiderin staining.  The remaining synovium is normal.  The medial compartment reveals an intact medial meniscus.  The articular cartilage has minimal fibrillation of the femoral condyle.  The tibial plateau is intact.  The notch reveals an intact ACL graft.  The fibers are slightly lax, but do take up tension with an intraoperative Lachman.  The PCL is intact.  The lateral compartment reveals a degenerative lateral meniscus tearing at the mid body and anterior horn.  The tibial plateau reveals some minimal softening.  There is minimal softening and fibrillation of the femoral condyle.    IMPLANTS:  None.    DESCRIPTION OF THE OPERATION:  After the patient was counseled, plans, alternatives and risks were discussed, consent was obtained.  The correct operative extremity was marked in the preoperative holding area.  Preoperative antibiotics were administered.  The patient was brought back to the operating suite and administered a general anesthetic without difficulty.  The examination under anesthesia was performed, and the  findings are noted above.  The left lower extremity was prepped and draped in the usual sterile fashion.  A timeout process was completed.  A standard anterolateral arthroscopy portal was created followed by an anteromedial portal for the working instruments.  A thorough diagnostic arthroscopy was undertaken and the findings are noted above.  The synovial mass was removed with a grasper and sent to the lab for permanent pathology.  Proliferative synovitis anteromedially and anterolaterally was debrided.  The motorized resector was used to gently debride the unstable torn inner margin meniscal tissue back to a stable margin.  Attention was turned to the patellofemoral articulation.  The unstable cartilage on the patella was gently debrided back to a stable margin.  The knee was now copiously lavaged and the arthroscopic instruments were removed.  Portal sites were closed with nylon suture.  Intraarticular morphine instilled and a sterile dressing was applied.  The patient was extubated on the operating room table and taken to the recovery room in good condition.  He tolerated the procedure well.  There were no complications.  Estimated blood loss was 5 mL.  A tourniquet was not used.    DISPOSITION:  The patient will be discharged home through Same Day Surgery per protocol.  His weightbearing status can be as tolerated.  His range of motion can be as tolerated.  He can remove his dressing on postoperative day 2 and shower, redressing his incisions with Band-Aids and Tubigrip.  The patient will follow a standard knee arthroscopy rehabilitation protocol.  He will follow up in approximately 8 days for suture removal.    Jarod Meyers MD        D: 2022   T: 2022   MT: KOURTNEY    Name:     MUKUL JOHN  MRN:      [id number]        Account:        [id number]   :      1980           Procedure Date: 2022     Document: [id number]

## 2022-02-02 NOTE — DISCHARGE INSTRUCTIONS
POST-OP INSTRUCTIONS  MENISCECTOMY  MD Jeremy Henry PA-C    INCISION CARE  -       Keep incision clean and dry  -       Dressing may be removed two days after surgery  -       Replace dressing with bandaids  -       You may shower on post-op day 2  -       Do not submerge operative knee in water (baths, hot tubs, lakes,  swimming pools) for at least 3 weeks from surgery      PAIN MANAGEMENT  -       Take pain medication as prescribed  -       As pain starts to decrease you may wean yourself off the pain meds  -       If you no longer need your prescription pain medication you may take            Tylenol or NSAIDs (Advil, ibuprofen, naproxen), as needed  -       Ice knee every 3-4 hours as needed for pain or swelling  -       Do not drive or drink alcohol while taking narcotic pain medication    ACTIVITY  -       Use crutches as needed for comfort  -       Weight bear as tolerated. There are no restrictions on your range of  motion.  -       Avoid all high-impact activities (running, jumping) for at least 6 weeks            from surgery      PHYSICAL THERAPY  -       Physical therapy is recommended to help regain your motion and            strength after surgery  -       Schedule physical therapy to start within 1 week of surgery      FOLLOW UP  -        Follow up in clinic within 7-14 days after surgery for suture           removal and wound check  -       Call your clinic to confirm the date and time of your appointment          [facility]: [phone]          [facility]: [phone]    Same-Day Surgery   Adult Discharge Orders & Instructions     For 24 hours after surgery:  1. Get plenty of rest.  A responsible adult must stay with you for at least 24 hours after you leave the hospital.   2. Pain medication can slow your reflexes. Do not drive or use heavy equipment.  If you have weakness or tingling, don't drive or use heavy equipment until this feeling goes  away.  3. Mixing alcohol and pain medication can cause dizziness and slow your breathing. It can even be fatal. Do not drink alcohol while taking pain medication.  4. Avoid strenuous or risky activities.  Ask for help when climbing stairs.   5. You may feel lightheaded.  If so, sit for a few minutes before standing.  Have someone help you get up.   6. If you have nausea (feel sick to your stomach), drink only clear liquids such as apple juice, ginger ale, broth or 7-Up.  Rest may also help.  Be sure to drink enough fluids.  Move to a regular diet as you feel able. Take pain medications with a small amount of solid food, such as toast or crackers, to avoid nausea.   7. A slight fever is normal. Call the doctor if your fever is over 100 F (37.7 C) (taken under the tongue) or lasts longer than 24 hours.  8. You may have a dry mouth, muscle aches, trouble sleeping or a sore throat.  These symptoms should go away after 24 hours.  9. Do not make important or legal decisions.   Pain Management:      1. Take pain medication (if prescribed) for pain as directed by your physician.        2. WARNING: If the pain medication you have been prescribed contains Tylenol  (acetaminophen), DO NOT take additional doses of Tylenol (acetaminophen).     Call your doctor for any of the followin.  Signs of infection (fever, growing tenderness at the surgery site, severe pain, a large amount of drainage or bleeding, foul-smelling drainage, redness, swelling).    2.  It has been over 8 to 10 hours since surgery and you are still not able to urinate (pee).    3.  Headache for over 24 hours.    4.  Numbness, tingling or weakness the day after surgery (if you had spinal anesthesia).  To contact a doctor, call Dr. Meyers's clinic at [phone]   or:      [phone] and ask for the Resident On Call for:          Orthopedic Surgery (answered 24 hours a day)      Emergency Department:  [facility]:  "[phone]  [facility]: [phone]               Rev. 10/2014  Safety Tips for Using Crutches    Crutch Fit:    Assume good standing posture with shoulders relaxed and crutch tips 6-8 inches out from the side of the foot.    The underarm pad should fall 2-3 fingers width below the armpit.    The handgrip is positioned level with the wrist to allow 30  flexion at the elbow.    Safety Tips:    Bear weight on your hands, not on your armpits.    Do not add extra padding to the underarm pad. This will, in effect, lengthen the crutches and increase risk of nerve injury.    Wear flat, properly fitting shoes. Do not walk in stocking feet, high heels or slippers.    Household hazards:  --Throw rugs should be removed from floors.  --Stairs should be cleared of obstacles.  --Use extra caution on slippery, highly polished, littered or uneven floor surfaces.  --Check for electric cords.    Check crutch tips for excessive wear and keep wing nuts tight.    While walking, look forward with  head up  and  eyes open.  Take equal length steps.    Use BOTH crutches.    Stairs Sequence:    UP: \"Good\" leg first, followed by  bad  leg, then crutches.    DOWN: Crutches, followed by  bad  leg, \"good\" leg.     Walking with Crutches:    Move both crutches forward at the same time.    Non-Weight Bearing (NWB):  Hold the involved leg up and swing through the crutches with the involved leg. The involved leg does not touch the floor.    Toe Touch Weight Bearing (TTWB): Move the involved leg forward. Rest it lightly on the floor for balance only. Step through the crutches with the uninvolved leg.    Partial Weight Bearing (PWB): Move the involved leg forward. Step down the weight of the leg only.  Step through the crutches with the uninvolved leg.    Weight Bearing As Tolerated (WBAT): Move the involved leg forward. Put as much pressure through the involved leg as you can tolerate comfortably. Then step through the crutches " with the uninvolved leg.    Rev. 4/2014

## 2022-02-02 NOTE — BRIEF OP NOTE
[facility]    Brief Operative Note    Pre-operative diagnosis: Left Knee mass, chondral wear  Post-operative diagnosis Left Knee mass, chondral wear, lateral meniscal tear    Procedure: Procedure(s):  Left knee arthroscopy, synovial biopsy, partial lateral meniscectomy, chondraplasty  Surgeon: Surgeon(s) and Role:     * Jarod Meyers MD - Primary     * Jeremy Montero PA-C - Assisting  Anesthesia: General   Estimated Blood Loss: Less than 10 ml    Drains: None  Specimens:   ID Type Source Tests Collected by Time Destination   1 : Left Knee Synovium Tissue Knee, Left SURGICAL PATHOLOGY EXAM Jarod Meyers MD 2/2/2022 11:51 AM      Findings:   None.  Complications: None.  Implants: * No implants in log *

## 2022-02-03 LAB
PATH REPORT.COMMENTS IMP SPEC: NORMAL
PATH REPORT.COMMENTS IMP SPEC: NORMAL
PATH REPORT.FINAL DX SPEC: NORMAL
PATH REPORT.GROSS SPEC: NORMAL
PATH REPORT.MICROSCOPIC SPEC OTHER STN: NORMAL
PATH REPORT.RELEVANT HX SPEC: NORMAL
PHOTO IMAGE: NORMAL

## 2022-02-04 NOTE — ANESTHESIA POSTPROCEDURE EVALUATION
Patient: Lj Lamas    Procedure: Procedure(s):  Left knee arthroscopy, synovial biopsy, partial lateral meniscectomy, chondroplasty       Diagnosis:Knee mass, left [R22.42]  Diagnosis Additional Information: No value filed.    Anesthesia Type:  General    Note:  Disposition: Outpatient   Postop Pain Control: Uneventful            Sign Out: Well controlled pain   PONV: No   Neuro/Psych: Uneventful            Sign Out: Acceptable/Baseline neuro status   Airway/Respiratory: Uneventful            Sign Out: Acceptable/Baseline resp. status   CV/Hemodynamics: Uneventful            Sign Out: Acceptable CV status; No obvious hypovolemia; No obvious fluid overload   Other NRE: NONE   DID A NON-ROUTINE EVENT OCCUR? No           Last vitals:  Vitals Value Taken Time   /68 02/02/22 1315   Temp 36.3  C (97.3  F) 02/02/22 1315   Pulse 69 02/02/22 1325   Resp 12 02/02/22 1326   SpO2 98 % 02/02/22 1328   Vitals shown include unvalidated device data.    Electronically Signed By: Brinda Burnett MD  February 4, 2022  6:54 AM

## 2022-11-21 ENCOUNTER — HEALTH MAINTENANCE LETTER (OUTPATIENT)
Age: 42
End: 2022-11-21

## 2022-12-31 ENCOUNTER — OFFICE VISIT (OUTPATIENT)
Dept: URGENT CARE | Facility: URGENT CARE | Age: 42
End: 2022-12-31
Payer: COMMERCIAL

## 2022-12-31 VITALS
SYSTOLIC BLOOD PRESSURE: 138 MMHG | OXYGEN SATURATION: 99 % | TEMPERATURE: 97.5 F | HEART RATE: 64 BPM | DIASTOLIC BLOOD PRESSURE: 87 MMHG

## 2022-12-31 DIAGNOSIS — R07.0 THROAT PAIN: Primary | ICD-10-CM

## 2022-12-31 DIAGNOSIS — R05.9 COUGH, UNSPECIFIED TYPE: ICD-10-CM

## 2022-12-31 DIAGNOSIS — J03.90 TONSILLITIS: ICD-10-CM

## 2022-12-31 DIAGNOSIS — R09.82 PND (POST-NASAL DRIP): ICD-10-CM

## 2022-12-31 LAB
DEPRECATED S PYO AG THROAT QL EIA: NEGATIVE
GROUP A STREP BY PCR: NOT DETECTED

## 2022-12-31 PROCEDURE — 99213 OFFICE O/P EST LOW 20 MIN: CPT | Performed by: FAMILY MEDICINE

## 2022-12-31 PROCEDURE — 87651 STREP A DNA AMP PROBE: CPT | Performed by: FAMILY MEDICINE

## 2022-12-31 RX ORDER — CEFDINIR 300 MG/1
300 CAPSULE ORAL 2 TIMES DAILY
Qty: 20 CAPSULE | Refills: 0 | Status: SHIPPED | OUTPATIENT
Start: 2022-12-31 | End: 2023-01-10

## 2022-12-31 NOTE — PROGRESS NOTES
SUBJECTIVE: Lj Lamas is a 42 year old male presenting with a chief complaint of sore throat.  Onset of symptoms was 1 day(s) ago.  Current and Associated symptoms: pnd and cough 1 week  Predisposing factors include None.    Past Medical History:   Diagnosis Date     Breen's esophagus without dysplasia      Gastroesophageal reflux disease      Obese      Obstructive sleep apnea syndrome      Other chronic pain     low back pain - compressed vertebrae     PONV (postoperative nausea and vomiting)      Sleep apnea     mild - does not use CPAP     Venous (peripheral) insufficiency 2/9/2017     Allergies   Allergen Reactions     Morphine Nausea and Vomiting     Social History     Tobacco Use     Smoking status: Never     Smokeless tobacco: Never     Tobacco comments:     occass cigars in [employer]   Substance Use Topics     Alcohol use: Yes     Alcohol/week: 0.0 standard drinks     Comment: 1-2 drinks per month       ROS:  SKIN: no rash  GI: no vomiting    OBJECTIVE:  /87 (BP Location: Right arm, Patient Position: Sitting, Cuff Size: Adult Large)   Pulse 64   Temp 97.5  F (36.4  C) (Tympanic)   SpO2 99% GENERAL APPEARANCE: healthy, alert and no distress  EYES: EOMI,  PERRL, conjunctiva clear  HENT: TM's normal bilaterally, tonsillar hypertrophy, tonsillar erythema and tonsillar exudate  RESP: lungs clear to auscultation - no rales, rhonchi or wheezes  SKIN: no suspicious lesions or rashes      ICD-10-CM    1. Throat pain  R07.0 Streptococcus A Rapid Screen w/Reflex to PCR     Group A Streptococcus PCR Throat Swab      2. PND (post-nasal drip)  R09.82       3. Cough, unspecified type  R05.9       4. Tonsillitis  J03.90 cefdinir (OMNICEF) 300 MG capsule        Fluids/Rest, f/u if worse/not any better

## 2023-02-09 ENCOUNTER — OFFICE VISIT (OUTPATIENT)
Dept: URGENT CARE | Facility: URGENT CARE | Age: 43
End: 2023-02-09
Payer: COMMERCIAL

## 2023-02-09 VITALS
RESPIRATION RATE: 20 BRPM | HEART RATE: 61 BPM | TEMPERATURE: 97.9 F | SYSTOLIC BLOOD PRESSURE: 141 MMHG | DIASTOLIC BLOOD PRESSURE: 85 MMHG | OXYGEN SATURATION: 98 %

## 2023-02-09 DIAGNOSIS — S29.9XXA TRAUMATIC INJURY OF RIB: Primary | ICD-10-CM

## 2023-02-09 PROCEDURE — 99213 OFFICE O/P EST LOW 20 MIN: CPT | Performed by: PHYSICIAN ASSISTANT

## 2023-02-09 RX ORDER — CYCLOBENZAPRINE HCL 10 MG
10 TABLET ORAL 3 TIMES DAILY PRN
Qty: 25 TABLET | Refills: 0 | Status: SHIPPED | OUTPATIENT
Start: 2023-02-09

## 2023-02-09 ASSESSMENT — ENCOUNTER SYMPTOMS
SHORTNESS OF BREATH: 0
COUGH: 0
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
## 2023-02-17 DIAGNOSIS — K22.70 BARRETT'S ESOPHAGUS WITHOUT DYSPLASIA: ICD-10-CM

## 2023-02-17 RX ORDER — LANSOPRAZOLE 30 MG/1
CAPSULE, DELAYED RELEASE ORAL
Qty: 90 CAPSULE | Refills: 4 | Status: SHIPPED | OUTPATIENT
Start: 2023-02-17

## 2023-02-17 NOTE — TELEPHONE ENCOUNTER
Lansoprazole 30mg DR capsule  Last prescribed by Dr. Wise on 12/9/21.   Fax refill request from [facility].     Routed to Dr. Charbel Wise.

## 2023-04-16 ENCOUNTER — HEALTH MAINTENANCE LETTER (OUTPATIENT)
Age: 43
End: 2023-04-16

## 2023-07-15 NOTE — MR AVS SNAPSHOT
After Visit Summary   7/19/2018    Lj Lamas    MRN: [id number]           Patient Information     Date Of Birth          1980        Visit Information        Provider Department      7/19/2018 11:00 AM José Kan MD [facility]        Today's Diagnoses     Trigeminal neuralgia    -  1       Follow-ups after your visit        Your next 10 appointments already scheduled     Aug 15, 2018   Procedure with Charbel Wise MD   [facility] [place], Endoscopy ([facility], [facility])    [address]  [facility] [place] [address] [phone]           The [facility] is located on the corner of [facility] and [facility] on the [facility]. It is easily accessible from virtually any point in the [facility] area, via General [employer] and [employer].              Who to contact     If you have questions or need follow up information about today's clinic visit or your schedule please contact [facility] directly at [phone].  Normal or non-critical lab and imaging results will be communicated to you by LogicMonitorhart, letter or phone within 4 business days after the clinic has received the results. If you do not hear from us within 7 days, please contact the clinic through [employer] or phone. If you have a critical or abnormal lab result, we will notify you by phone as soon as possible.  Submit refill requests through [facility] or call your pharmacy and they will forward the refill request to us. Please allow 3 business days for your refill to be completed.          Additional Information About Your Visit        LogicMonitorhart Information     [facility] gives you secure access to your electronic health record. If you see a primary care provider, you can also send messages to your care team and make appointments. If you have questions, please call your primary care clinic.  If you do not have a primary care provider, please call  "[phone] and they will assist you.        Care EveryWhere ID     This is your Care EveryWhere ID. This could be used by other organizations to access your [place] medical records  [id number]        Your Vitals Were     Pulse Temperature Height Pulse Oximetry BMI (Body Mass Index)       74 97.4  F (36.3  C) (Oral) 5' 10\" (1.778 m) 97% 47.44 kg/m2        Blood Pressure from Last 3 Encounters:   07/19/18 123/72   07/05/18 126/66   05/25/18 110/75    Weight from Last 3 Encounters:   07/19/18 330 lb 9.6 oz (150 kg)   07/05/18 322 lb 11.2 oz (146.4 kg)   05/23/18 312 lb 2.7 oz (141.6 kg)              Today, you had the following     No orders found for display       Primary Care Provider Office Phone # Fax #    José Kan [phone] [address]  [facility] [address]        Equal Access to Services     [facility]: Hadii aad ku hadasho Soomaali, waaxda luqadaha, qaybta kaalmada adeegyada, larry verma . So [facility] [phone].    ATENCIÓN: Si habla español, tiene a de leon disposición servicios gratuitos de asistencia lingüística. Llame al [phone].    We comply with applicable federal civil rights laws and [place] laws. We do not discriminate on the basis of race, color, national origin, age, disability, sex, sexual orientation, or gender identity.            Thank you!     Thank you for choosing [facility]  for your care. Our goal is always to provide you with excellent care. Hearing back from our patients is one way we can continue to improve our services. Please take a few minutes to complete the written survey that you may receive in the mail after your visit with us. Thank you!             Your Updated Medication List - Protect others around you: Learn how to safely use, store and throw away your medicines at www.disposemymeds.org.          This list is accurate as of 7/19/18 11:34 AM.  Always use your most recent med list.          "          Brand Name Dispense Instructions for use Diagnosis    acetaminophen 325 MG tablet    TYLENOL     Take 1,000 mg by mouth        carBAMazepine 200 MG 12 hr tablet    TEGretol XR    180 tablet    Take 1 tablet (200 mg) by mouth 2 times daily    Trigeminal neuralgia       CLARITIN PO      Take by mouth daily        ibuprofen 600 MG tablet    ADVIL/MOTRIN     Take 600 mg by mouth        LANsoprazole 30 MG CR capsule    PREVACID    30 capsule    Take 1 capsule (30 mg) by mouth 2 times daily Open capsule put in apple sauce    Breen's esophagus          40 yo male with no sig pmhx presents c/o left pain, swelling, redness to posterior left calf x 1 week, initially was pimple and then became bigger and more painful. patient went to urgent care for this, has taken three doses of bactrim so far, with no change. no fever/chills. no hx IVDU. no hx DM or HIV. no hx cellulitis or abscess previously.

## 2024-03-04 NOTE — PROGRESS NOTES
[facility]  [address] [address]  [phone]  Dept: [phone]    PRE-OP EVALUATION:  Today's date: 2018    Lj Lamas (: 1980) presents for pre-operative evaluation assessment as requested by Dr. Meyers.  He requires evaluation and anesthesia risk assessment prior to undergoing surgery/procedure for treatment of Arthroscopic Reconstruction Anterior Cruciate Ligament .    Proposed Surgery/ Procedure: Arthroscopic Reconstruction Anterior Cruciate Ligament  Date of Surgery/ Procedure: 2018  Time of Surgery/ Procedure: 12:30  Hospital/Surgical Facility: [facility]    Primary Physician: José Kan  Type of Anesthesia Anticipated: to be determined    Patient has a Health Care Directive or Living Will:  NO    1. NO - Do you have a history of heart attack, stroke, stent, bypass or surgery on an artery in the head, neck, heart or legs?  2. NO - Do you ever have any pain or discomfort in your chest?  3. NO - Do you have a history of  Heart Failure?  4. NO - Are you troubled by shortness of breath when: walking on the level, up a slight hill or at night?  5. NO - Do you currently have a cold, bronchitis or other respiratory infection?  6. NO - Do you have a cough, shortness of breath or wheezing?  7. NO - Do you sometimes get pains in the calves of your legs when you walk?  8. NO - Do you or anyone in your family have previous history of blood clots?  9. NO - Do you or does anyone in your family have a serious bleeding problem such as prolonged bleeding following surgeries or cuts?  10. NO - Have you ever had problems with anemia or been told to take iron pills?  11. NO - Have you had any abnormal blood loss such as black, tarry or bloody stools, or abnormal vaginal bleeding?  12. NO - Have you ever had a blood transfusion?  13. YES - HAVE YOU OR ANY OF YOUR RELATIVES EVER HAD PROBLEMS WITH ANESTHESIA? Mother had adverse reaction to anesthesia (difficulty  68-year-old male with past medical history of hypertension, chronic back pain, prostate cancer, and posterior mediastinal mass 6.5 cm seen on CT 11/2023, presents for 3 months of progressively difficulty swallowing associated with 10 pounds weight loss. Comes in today now unable to swallow his pills with water, resulting in 2 episode of emesis. GI consulted for dysphagia.     # Dysphagia secondary to mediastinal mass  s/p EGD with esophageal stent and EUS guided FNB   Prostate cancer and is s/p radiation therapy.   Patient had known mediastinal mass since jan 2023, Was evaluated recently by his urologist for new bladder mass. Patient was scheduled for cystoscopy and PET scan to r/o multiple primary malignancy.   Mediastinal mass was never biopsied per patient.  Hx of VERONICA on iron supplement. NO overt GI bleeding.   Reviewed CT scan: large post mediastinal mass, + LNs, pill within upper esophagus, distended proximal esophagus. and upper mesenteric mass.  Path: poorly differentiated carcinoma   esophagogram noted     PLAN   Patient is refusing G tube placement. Will plan for EGD to evaluate for stent migration/occlusion. NPO after midnight.   c/w clear liquids today.   f/u with oncology   F/U with urology as outpatient for bladder mass and prostate cancer.   Plan was discussed with the patient.           "waking after procedure)  14. YES - DO YOU HAVE SLEEP APNEA, EXCESSIVE SNORING OR DAYTIME DROWSINESS? He snores a lot, he was told he has \"mild CPAP\" but it was not severe enough to warrant a CPAP machine  15. NO - Do you have any prosthetic heart valves?  16. NO - Do you have prosthetic joints?  17. NO - Is there any chance that you may be pregnant?      HPI:     HPI related to upcoming procedure: He slipped on ICE several weeks ago and sustained multiple injuries to left knee that will require surgical correction.  He has been in an immobilizer since 4/4/18.  He has improving pain and not using opioids for pain management.  He has had adequate pain control with APAP and NSAIDs.  Prior to his fall he could climb one flight of stairs without chest pain or dyspnea.            MEDICAL HISTORY:     Patient Active Problem List    Diagnosis Date Noted     Dysphagia, unspecified type 03/22/2018     Priority: Medium     Morbid obesity due to excess calories (H) 02/09/2017     Priority: Medium     Venous (peripheral) insufficiency 02/09/2017     Priority: Medium      Past Medical History:   Diagnosis Date     Venous (peripheral) insufficiency 2/9/2017     Past Surgical History:   Procedure Laterality Date     ESOPHAGOSCOPY, GASTROSCOPY, DUODENOSCOPY (EGD), COMBINED N/A 3/28/2018    Procedure: COMBINED ESOPHAGOSCOPY, GASTROSCOPY, DUODENOSCOPY (EGD);  egd;  Surgeon: Charbel Wise MD;  Location: [facility]     NO HISTORY OF SURGERY           No Known Allergies        Social History   Substance Use Topics     Smoking status: Never Smoker     Smokeless tobacco: Never Used      Comment: occass cigars in college     Alcohol use 0.0 oz/week     0 Standard drinks or equivalent per week      Comment: 2-4 drinks per month     History   Drug Use No       REVIEW OF SYSTEMS:   Constitutional, neuro, ENT, endocrine, pulmonary, cardiac, gastrointestinal, genitourinary, musculoskeletal, integument and psychiatric systems are negative, " "except as otherwise noted.    EXAM:   /74 (BP Location: Right arm, Patient Position: Chair, Cuff Size: Adult Large)  Pulse 79  Temp 98.2  F (36.8  C) (Oral)  Resp 18  Ht 5' 9.5\" (1.765 m)  Wt (!) 334 lb (151.5 kg)  SpO2 96%  BMI 48.62 kg/m2     GENERAL APPEARANCE: healthy, alert and no distress     EYES: EOMI,  PERRL     HENT: ear canals and TM's normal and nose and mouth without ulcers or lesions     NECK: no adenopathy, no asymmetry, masses, or scars and thyroid normal to palpation     RESP: lungs clear to auscultation - no rales, rhonchi or wheezes     CV: regular rates and rhythm, normal S1 S2, no S3 or S4 and no murmur, click or rub     ABDOMEN:  Obese, soft, nontender, no HSM or masses and bowel sounds normal     MS: Left leg is in an immobilizer there is moderate edema in the left leg      SKIN: no suspicious lesions or rashes     NEURO: Normal strength and tone, sensory exam grossly normal, mentation intact and speech normal     PSYCH: mentation appears normal. and affect normal/bright     LYMPHATICS: No cervical adenopathy    DIAGNOSTICS:   EKG: Not indicated due to non-vascular surgery and low risk of event (age <65 and without cardiac risk factors)  A1C =5 and hemoglobin 15.9  IMPRESSION:   Reason for surgery/procedure: left knee ACL rupture and other orthopedic injuries   Diagnosis/reason for consult: preoperative evaluation     The proposed surgical procedure is considered INTERMEDIATE risk.    REVISED CARDIAC RISK INDEX  The patient has the following serious cardiovascular risks for perioperative complications such as (MI, PE, VFib and 3  AV Block):  No serious cardiac risks  INTERPRETATION: 0 risks: Class I (very low risk - 0.4% complication rate)    The patient has the following additional risks for perioperative complications:  No identified additional risks      ICD-10-CM    1. Preop general physical exam Z01.818    2. Rupture of anterior cruciate ligament of left knee, subsequent " encounter S83.512D CBC with platelets   3. Impaired fasting glucose R73.01 Hemoglobin A1c       RECOMMENDATIONS:     --Consult hospital rounder / IM to assist post-op medical management    --Patient is on no chronic medications    APPROVAL GIVEN to proceed with proposed procedure, without further diagnostic evaluation       Signed Electronically by: José Kan MD    Copy of this evaluation report is provided to requesting physician.    [place] Preop Guidelines    Revised Cardiac Risk Index

## 2024-06-22 ENCOUNTER — HEALTH MAINTENANCE LETTER (OUTPATIENT)
Age: 44
End: 2024-06-22

## 2024-07-27 NOTE — LETTER
No outbursts or agitation per nursing    Seroquel   November 9, 2020      Lj Sanchez Lamas  [address]  [facility] [address]        Dear ,    The following letter pertains to your most recent diagnostic tests:     -TSH (thyroid stimulating hormone) level is normal which indicates normal circulating thyroid hormone levels.       -Kidney function is slightly abnormal (creatinine and GFR), Sodium is normal for you, Potassium is normal for you, Calcium is normal for you, Glucose (blood sugar) is normal for you.     -Your complete blood counts including your hemoglobin returned normal for you.         Bottom line:  With the exception of a mildly elevated serum creatine as mentioned above, there are no other abnormalities on these lab results.  Specifically, there are no findings which would explain abnormal heart rhythms.   The creatinine should be rechecked.  It can often be elevated due to a mildly dehydrated state.  As such, I would recommend returning to the lab in a well hydrated state.     Follow up:  Lab appointment as your convenience in a well hydrated state to recheck the creatinine       Resulted Orders   CBC with platelets   Result Value Ref Range    WBC 9.2 4.0 - 11.0 10e9/L    RBC Count 5.37 4.4 - 5.9 10e12/L    Hemoglobin 16.3 13.3 - 17.7 g/dL    Hematocrit 47.3 40.0 - 53.0 %    MCV 88 78 - 100 fl    MCH 30.4 26.5 - 33.0 pg    MCHC 34.5 31.5 - 36.5 g/dL    RDW 13.3 10.0 - 15.0 %    Platelet Count 229 150 - 450 10e9/L   Basic metabolic panel   Result Value Ref Range    Sodium 138 133 - 144 mmol/L    Potassium 4.3 3.4 - 5.3 mmol/L    Chloride 107 94 - 109 mmol/L    Carbon Dioxide 29 20 - 32 mmol/L    Anion Gap 2 (L) 3 - 14 mmol/L    Glucose 81 70 - 99 mg/dL    Urea Nitrogen 17 7 - 30 mg/dL    Creatinine 1.43 (H) 0.66 - 1.25 mg/dL    GFR Estimate 60 (L) >60 mL/min/[1.73_m2]      Comment:      Non  GFR Calc  Starting 12/18/2018, serum creatinine based estimated GFR (eGFR) will be   calculated using the Chronic  Kidney Disease Epidemiology Collaboration   (CKD-EPI) equation.      GFR Estimate If Black 69 >60 mL/min/[1.73_m2]      Comment:       GFR Calc  Starting 12/18/2018, serum creatinine based estimated GFR (eGFR) will be   calculated using the Chronic Kidney Disease Epidemiology Collaboration   (CKD-EPI) equation.      Calcium 9.1 8.5 - 10.1 mg/dL   TSH with free T4 reflex   Result Value Ref Range    TSH 1.64 0.40 - 4.00 mU/L       Sincerely,     Dr. Kan

## 2025-03-14 ENCOUNTER — OFFICE VISIT (OUTPATIENT)
Dept: FAMILY MEDICINE | Facility: CLINIC | Age: 45
End: 2025-03-14
Payer: COMMERCIAL

## 2025-03-14 VITALS
WEIGHT: 315 LBS | HEIGHT: 69 IN | RESPIRATION RATE: 18 BRPM | DIASTOLIC BLOOD PRESSURE: 75 MMHG | TEMPERATURE: 96.9 F | SYSTOLIC BLOOD PRESSURE: 128 MMHG | HEART RATE: 84 BPM | BODY MASS INDEX: 46.65 KG/M2 | OXYGEN SATURATION: 98 %

## 2025-03-14 DIAGNOSIS — Z00.00 ANNUAL PHYSICAL EXAM: Primary | ICD-10-CM

## 2025-03-14 DIAGNOSIS — Z23 NEED FOR VACCINATION: ICD-10-CM

## 2025-03-14 DIAGNOSIS — Z12.11 SCREEN FOR COLON CANCER: ICD-10-CM

## 2025-03-14 DIAGNOSIS — R68.82 LOW LIBIDO: ICD-10-CM

## 2025-03-14 DIAGNOSIS — Z13.1 SCREENING FOR DIABETES MELLITUS: ICD-10-CM

## 2025-03-14 DIAGNOSIS — Z23 NEED FOR PROPHYLACTIC VACCINATION AND INOCULATION AGAINST INFLUENZA: ICD-10-CM

## 2025-03-14 DIAGNOSIS — Z23 HIGH PRIORITY FOR 2019-NCOV VACCINE: ICD-10-CM

## 2025-03-14 DIAGNOSIS — G47.33 OBSTRUCTIVE SLEEP APNEA SYNDROME: ICD-10-CM

## 2025-03-14 DIAGNOSIS — E66.01 MORBID OBESITY DUE TO EXCESS CALORIES (H): ICD-10-CM

## 2025-03-14 DIAGNOSIS — K22.70 BARRETT'S ESOPHAGUS WITHOUT DYSPLASIA: ICD-10-CM

## 2025-03-14 DIAGNOSIS — Z11.3 ROUTINE SCREENING FOR STI (SEXUALLY TRANSMITTED INFECTION): ICD-10-CM

## 2025-03-14 DIAGNOSIS — H61.23 BILATERAL IMPACTED CERUMEN: ICD-10-CM

## 2025-03-14 DIAGNOSIS — Z11.59 NEED FOR HEPATITIS C SCREENING TEST: ICD-10-CM

## 2025-03-14 PROCEDURE — 90656 IIV3 VACC NO PRSV 0.5 ML IM: CPT | Performed by: PHYSICIAN ASSISTANT

## 2025-03-14 PROCEDURE — 91320 SARSCV2 VAC 30MCG TRS-SUC IM: CPT | Performed by: PHYSICIAN ASSISTANT

## 2025-03-14 PROCEDURE — 69209 REMOVE IMPACTED EAR WAX UNI: CPT | Mod: 50 | Performed by: PHYSICIAN ASSISTANT

## 2025-03-14 PROCEDURE — 3074F SYST BP LT 130 MM HG: CPT | Performed by: PHYSICIAN ASSISTANT

## 2025-03-14 PROCEDURE — 99396 PREV VISIT EST AGE 40-64: CPT | Mod: 25 | Performed by: PHYSICIAN ASSISTANT

## 2025-03-14 PROCEDURE — 90471 IMMUNIZATION ADMIN: CPT | Performed by: PHYSICIAN ASSISTANT

## 2025-03-14 PROCEDURE — 90480 ADMN SARSCOV2 VAC 1/ONLY CMP: CPT | Performed by: PHYSICIAN ASSISTANT

## 2025-03-14 PROCEDURE — 90715 TDAP VACCINE 7 YRS/> IM: CPT | Performed by: PHYSICIAN ASSISTANT

## 2025-03-14 PROCEDURE — 3078F DIAST BP <80 MM HG: CPT | Performed by: PHYSICIAN ASSISTANT

## 2025-03-14 PROCEDURE — 1125F AMNT PAIN NOTED PAIN PRSNT: CPT | Performed by: PHYSICIAN ASSISTANT

## 2025-03-14 PROCEDURE — 90472 IMMUNIZATION ADMIN EACH ADD: CPT | Performed by: PHYSICIAN ASSISTANT

## 2025-03-14 RX ORDER — DEXTROAMPHETAMINE SACCHARATE, AMPHETAMINE ASPARTATE, DEXTROAMPHETAMINE SULFATE AND AMPHETAMINE SULFATE 2.5; 2.5; 2.5; 2.5 MG/1; MG/1; MG/1; MG/1
10 TABLET ORAL 2 TIMES DAILY
COMMUNITY
Start: 2025-01-13

## 2025-03-14 SDOH — HEALTH STABILITY: PHYSICAL HEALTH: ON AVERAGE, HOW MANY DAYS PER WEEK DO YOU ENGAGE IN MODERATE TO STRENUOUS EXERCISE (LIKE A BRISK WALK)?: 5 DAYS

## 2025-03-14 ASSESSMENT — PAIN SCALES - GENERAL: PAINLEVEL_OUTOF10: MODERATE PAIN (6)

## 2025-03-14 ASSESSMENT — SOCIAL DETERMINANTS OF HEALTH (SDOH): HOW OFTEN DO YOU GET TOGETHER WITH FRIENDS OR RELATIVES?: TWICE A WEEK

## 2025-03-14 NOTE — NURSING NOTE
Patient identified using two patient identifiers.  Ear exam showing wax occlusion completed by provider.  Solution: warm water was placed in the both ear(s) via  irrigation tool: rhino ear . Pt tolerated well.  Maya Ritchie, CMA

## 2025-03-14 NOTE — PROGRESS NOTES
Preventive Care Visit  [facility] LILIANA Ivey PA-C, Physician Assistant - Medical  Mar 14, 2025      Assessment & Plan     Annual physical exam  Annual labs ordered. Healthy diet and exercise reviewed. Recommended routine dental, eye, and skin screenings.     - Lipid panel reflex to direct LDL Fasting  - CBC with platelets and differential  - Comprehensive metabolic panel (BMP + Alb, Alk Phos, ALT, AST, Total. Bili, TP)  - TSH with free T4 reflex  - Hemoglobin A1c    Breen's esophagus without dysplasia  Screen for colon cancer  Due for GI consult - setup endoscopy + colonoscopy   - Adult GI  Referral - Consult Only    Morbid obesity due to excess calories (H)  Healthy diet and exercise - weight loss efforts encouraged.   - CBC with platelets and differential  - Comprehensive metabolic panel (BMP + Alb, Alk Phos, ALT, AST, Total. Bili, TP)  - TSH with free T4 reflex  - Hemoglobin A1c    Obstructive sleep apnea syndrome  Needs new CPAP machine - not currently using.   - Adult Sleep Eval & Management  Referral    Low libido  Testosterone testing ordered as requested.   - Testosterone Free and Total    Screening for diabetes mellitus  - Hemoglobin A1c    Need for hepatitis C screening test  - Hepatitis C Screen Reflex to HCV RNA Quant and Genotype    Routine screening for STI (sexually transmitted infection)  - Hepatitis C Screen Reflex to HCV RNA Quant and Genotype  - HIV Antigen Antibody Combo  - Treponema Abs w Reflex to RPR and Titer  - NEISSERIA GONORRHOEA PCR  - CHLAMYDIA TRACHOMATIS PCR  - Hepatitis B surface antigen    Bilateral impacted cerumen  Ear lavage performed bilaterally - tolerated procedure well.   - REMOVE IMPACTED CERUMEN    Need for vaccination  TDAP    Need for prophylactic vaccination and inoculation against influenza  Flu shot    High priority for 2019-nCoV vaccine  COVID-19     Patient has been advised of split billing requirements and indicates  "understanding: Yes    BMI  Estimated body mass index is 58.39 kg/m  as calculated from the following:    Height as of this encounter: 1.765 m (5' 9.49\").    Weight as of this encounter: 181.9 kg (401 lb).   Weight management plan: Discussed healthy diet and exercise guidelines    Counseling  Appropriate preventive services were addressed with this patient via screening, questionnaire, or discussion as appropriate for fall prevention, nutrition, physical activity, Tobacco-use cessation, social engagement, weight loss and cognition.  Checklist reviewing preventive services available has been given to the patient.  Reviewed patient's diet, addressing concerns and/or questions.   The patient was instructed to see the dentist every 6 months.         Mary Camp is a 44 year old, presenting for the following:  Physical, Imm/Inj (Flu Shot), and Imm/Inj (COVID-19 VACCINE)     Here today for his annual physical.     -Due for colonoscopy + endoscopy (hx of Breen's) On Prevacid.   -Requesting STI screening. No concerns for active infection.   -Requesting testosterone testing - lower libido, slower recovery from exercise.   Stays active with armored combat sport/training.     Advance Care Planning  Patient does not have a Health Care Directive:       3/14/2025   General Health   How would you rate your overall physical health? Good   Feel stress (tense, anxious, or unable to sleep) Only a little   (!) STRESS CONCERN      3/14/2025   Nutrition   Three or more servings of calcium each day? Yes   Diet: Other   If other, please elaborate: i keep ahigh protien diet for competitive sport  also limit foods due to GERD   How many servings of fruit and vegetables per day? (!) 2-3   How many sweetened beverages each day? (!) 2         3/14/2025   Exercise   Days per week of moderate/strenous exercise 5 days         3/14/2025   Social Factors   Frequency of gathering with friends or relatives Twice a week   Worry food won't last " until get money to buy more No   Food not last or not have enough money for food? No   Do you have housing? (Housing is defined as stable permanent housing and does not include staying ouside in a car, in a tent, in an abandoned building, in an overnight shelter, or couch-surfing.) Yes   Are you worried about losing your housing? No   Lack of transportation? No   Unable to get utilities (heat,electricity)? No         3/14/2025   Dental   Dentist two times every year? (!) NO           Today's PHQ-2 Score:       3/14/2025     2:26 PM   PHQ-2 ( 1999 [employer])   Q1: Little interest or pleasure in doing things 1   Q2: Feeling down, depressed or hopeless 1   PHQ-2 Score 2    Q1: Little interest or pleasure in doing things Several days   Q2: Feeling down, depressed or hopeless Several days   PHQ-2 Score 2       Patient-reported           3/14/2025   Substance Use   Alcohol more than 3/day or more than 7/wk No   Do you use any other substances recreationally? (!) CANNABIS PRODUCTS     Social History     Tobacco Use    Smoking status: Never    Smokeless tobacco: Never    Tobacco comments:     occass cigars in [employer]   Substance Use Topics    Alcohol use: Yes     Alcohol/week: 0.0 standard drinks of alcohol     Comment: 1-2 drinks per month    Drug use: No           3/14/2025   STI Screening   New sexual partner(s) since last STI/HIV test? (!) YES   ASCVD Risk   The ASCVD Risk score (Yadi MOREIRA, et al., 2019) failed to calculate for the following reasons:    Cannot find a previous HDL lab    Cannot find a previous total cholesterol lab        3/14/2025   Contraception/Family Planning   Questions about contraception or family planning No        Reviewed and updated as needed this visit by Provider   Tobacco  Allergies  Meds   Med Hx  Surg Hx  Fam Hx            Allergies   Allergen Reactions    Morphine Nausea and Vomiting     Current Outpatient Medications   Medication Sig Dispense Refill     amphetamine-dextroamphetamine (ADDERALL) 10 MG tablet Take 10 mg by mouth 2 times daily.      fluticasone (FLONASE) 50 MCG/ACT nasal spray Spray 1 spray into both nostrils daily      LANsoprazole (PREVACID) 30 MG DR capsule TAKE 1 CAPSULE BY MOUTH EVERY DAY 90 capsule 4    levocetirizine (XYZAL) 5 MG tablet Take 5 mg by mouth every evening      Multiple Vitamin (MULTIVITAMIN PO)       VITAMIN D PO       cyclobenzaprine (FLEXERIL) 10 MG tablet Take 1 tablet (10 mg) by mouth 3 times daily as needed for muscle spasms 25 tablet 0     Past Medical History:   Diagnosis Date    Breen's esophagus without dysplasia     Gastroesophageal reflux disease     Obese     Obstructive sleep apnea syndrome     Other chronic pain     low back pain - compressed vertebrae    PONV (postoperative nausea and vomiting)     Sleep apnea     mild - does not use CPAP    Venous (peripheral) insufficiency 2/9/2017     Past Surgical History:   Procedure Laterality Date    ARTHROSCOPIC  RECONSTRUCTION/REPAIR LATERAL LIGAMENT KNEE Left 5/23/2018    Procedure: ARTHROSCOPIC  RECONSTRUCTION/REPAIR LATERAL LIGAMENT KNEE;;  Surgeon: Jarod Meyers MD;  Location: [facility] OR    ARTHROSCOPIC RECONSTRUCTION ANTERIOR CRUCIATE LIGAMENT Left 5/23/2018    Procedure: ARTHROSCOPIC RECONSTRUCTION ANTERIOR CRUCIATE LIGAMENT;  Left Knee  Arthroscopic Anterior Cruciate Ligament Reconstruction with Hamstring Allograft, Fibular Colateral Ligament Reconstruction with Allograft, and Peroneal Nerve Neurolysis;  Surgeon: Jarod Meyers MD;  Location: [facility] OR    ARTHROSCOPY KNEE Left 2/2/2022    Procedure: Left knee arthroscopy, synovial biopsy, partial lateral meniscectomy, chondroplasty;  Surgeon: Jarod Meyers MD;  Location: [facility] OR    ESOPHAGOSCOPY, GASTROSCOPY, DUODENOSCOPY (EGD), COMBINED N/A 3/28/2018    Procedure: COMBINED ESOPHAGOSCOPY, GASTROSCOPY, DUODENOSCOPY (EGD);  egd;  Surgeon: Charbel Wise MD;  Location:  GI    ESOPHAGOSCOPY,  "GASTROSCOPY, DUODENOSCOPY (EGD), COMBINED N/A 8/15/2018    Procedure: COMBINED ESOPHAGOSCOPY, GASTROSCOPY, DUODENOSCOPY (EGD), BIOPSY SINGLE OR MULTIPLE;  Upper Endo;  Surgeon: Charbel Wise MD;  [facility]    ESOPHAGOSCOPY, GASTROSCOPY, DUODENOSCOPY (EGD), COMBINED N/A 10/6/2021    Procedure: ESOPHAGOGASTRODUODENOSCOPY, WITH BIOPSY;  Surgeon: Charbel Wise MD;  Location:  GI    NO HISTORY OF SURGERY             Review of Systems  Constitutional, neuro, ENT, endocrine, pulmonary, cardiac, gastrointestinal, genitourinary, musculoskeletal, integument and psychiatric systems are negative, except as otherwise noted.     Objective    Exam  /75   Pulse 84   Temp 96.9  F (36.1  C)   Resp 18   Ht 1.765 m (5' 9.49\")   Wt (!) 181.9 kg (401 lb)   SpO2 98%   BMI 58.39 kg/m     Estimated body mass index is 58.39 kg/m  as calculated from the following:    Height as of this encounter: 1.765 m (5' 9.49\").    Weight as of this encounter: 181.9 kg (401 lb).    Physical Exam  GENERAL: alert and no distress  EYES: Eyes grossly normal to inspection, PERRL and conjunctivae and sclerae normal  HENT: cerumen impaction bilateral otherwise ear canals and TM's normal, nose and mouth without ulcers or lesions  NECK: no adenopathy, no asymmetry, masses, or scars  RESP: lungs clear to auscultation - no rales, rhonchi or wheezes  CV: regular rate and rhythm, normal S1 S2, no S3 or S4, no murmur, click or rub, no peripheral edema  ABDOMEN: soft, nontender, no hepatosplenomegaly, no masses and bowel sounds normal  MS: no gross musculoskeletal defects noted, no edema  SKIN: no suspicious lesions or rashes  NEURO: Normal strength and tone, mentation intact and speech normal  PSYCH: mentation appears normal, affect normal/bright    The likelihood of other entities in the differential is insufficient to justify any further testing for them at this time. This was explained to the patient. The patient was advised that " persistent or worsening symptoms would require further evaluation. Patient advised to call the office and if unable to reach to go to the emergency room if they develop any new or worsening symptoms. Expressed understanding and agreement with above stated plan.         Signed Electronically by: Julius Ivey PA-C

## 2025-03-19 ENCOUNTER — LAB (OUTPATIENT)
Dept: LAB | Facility: CLINIC | Age: 45
End: 2025-03-19
Payer: COMMERCIAL

## 2025-03-19 DIAGNOSIS — Z00.00 ANNUAL PHYSICAL EXAM: ICD-10-CM

## 2025-03-19 DIAGNOSIS — E66.01 MORBID OBESITY DUE TO EXCESS CALORIES (H): ICD-10-CM

## 2025-03-19 DIAGNOSIS — Z11.3 ROUTINE SCREENING FOR STI (SEXUALLY TRANSMITTED INFECTION): ICD-10-CM

## 2025-03-19 DIAGNOSIS — R68.82 LOW LIBIDO: ICD-10-CM

## 2025-03-19 DIAGNOSIS — Z13.1 SCREENING FOR DIABETES MELLITUS: ICD-10-CM

## 2025-03-19 DIAGNOSIS — Z11.59 NEED FOR HEPATITIS C SCREENING TEST: ICD-10-CM

## 2025-03-19 LAB
BASOPHILS # BLD AUTO: 0 10E3/UL (ref 0–0.2)
BASOPHILS NFR BLD AUTO: 0 %
C TRACH DNA SPEC QL NAA+PROBE: NEGATIVE
EOSINOPHIL # BLD AUTO: 0.2 10E3/UL (ref 0–0.7)
EOSINOPHIL NFR BLD AUTO: 3 %
ERYTHROCYTE [DISTWIDTH] IN BLOOD BY AUTOMATED COUNT: 12.7 % (ref 10–15)
EST. AVERAGE GLUCOSE BLD GHB EST-MCNC: 108 MG/DL
HBA1C MFR BLD: 5.4 % (ref 0–5.6)
HCT VFR BLD AUTO: 43.9 % (ref 40–53)
HGB BLD-MCNC: 14.4 G/DL (ref 13.3–17.7)
IMM GRANULOCYTES # BLD: 0 10E3/UL
IMM GRANULOCYTES NFR BLD: 0 %
LYMPHOCYTES # BLD AUTO: 1.4 10E3/UL (ref 0.8–5.3)
LYMPHOCYTES NFR BLD AUTO: 21 %
MCH RBC QN AUTO: 28.8 PG (ref 26.5–33)
MCHC RBC AUTO-ENTMCNC: 32.8 G/DL (ref 31.5–36.5)
MCV RBC AUTO: 88 FL (ref 78–100)
MONOCYTES # BLD AUTO: 0.4 10E3/UL (ref 0–1.3)
MONOCYTES NFR BLD AUTO: 6 %
N GONORRHOEA DNA SPEC QL NAA+PROBE: NEGATIVE
NEUTROPHILS # BLD AUTO: 4.7 10E3/UL (ref 1.6–8.3)
NEUTROPHILS NFR BLD AUTO: 69 %
PLATELET # BLD AUTO: 221 10E3/UL (ref 150–450)
RBC # BLD AUTO: 5 10E6/UL (ref 4.4–5.9)
SPECIMEN TYPE: NORMAL
SPECIMEN TYPE: NORMAL
T PALLIDUM AB SER QL: NONREACTIVE
WBC # BLD AUTO: 6.8 10E3/UL (ref 4–11)

## 2025-03-19 PROCEDURE — 85025 COMPLETE CBC W/AUTO DIFF WBC: CPT

## 2025-03-19 PROCEDURE — 87491 CHLMYD TRACH DNA AMP PROBE: CPT

## 2025-03-19 PROCEDURE — 83036 HEMOGLOBIN GLYCOSYLATED A1C: CPT

## 2025-03-19 PROCEDURE — 36415 COLL VENOUS BLD VENIPUNCTURE: CPT

## 2025-03-20 LAB
ALBUMIN SERPL BCG-MCNC: 4.1 G/DL (ref 3.5–5.2)
ALP SERPL-CCNC: 74 U/L (ref 40–150)
ALT SERPL W P-5'-P-CCNC: 21 U/L (ref 0–70)
ANION GAP SERPL CALCULATED.3IONS-SCNC: 12 MMOL/L (ref 7–15)
AST SERPL W P-5'-P-CCNC: 19 U/L (ref 0–45)
BILIRUB SERPL-MCNC: 0.3 MG/DL
BUN SERPL-MCNC: 13.3 MG/DL (ref 6–20)
CALCIUM SERPL-MCNC: 9.2 MG/DL (ref 8.8–10.4)
CHLORIDE SERPL-SCNC: 104 MMOL/L (ref 98–107)
CHOLEST SERPL-MCNC: 155 MG/DL
CREAT SERPL-MCNC: 1.13 MG/DL (ref 0.67–1.17)
EGFRCR SERPLBLD CKD-EPI 2021: 81 ML/MIN/1.73M2
FASTING STATUS PATIENT QL REPORTED: YES
FASTING STATUS PATIENT QL REPORTED: YES
GLUCOSE SERPL-MCNC: 94 MG/DL (ref 70–99)
HBV SURFACE AG SERPL QL IA: NONREACTIVE
HCO3 SERPL-SCNC: 23 MMOL/L (ref 22–29)
HCV AB SERPL QL IA: NONREACTIVE
HDLC SERPL-MCNC: 44 MG/DL
HIV 1+2 AB+HIV1 P24 AG SERPL QL IA: NONREACTIVE
LDLC SERPL CALC-MCNC: 97 MG/DL
NONHDLC SERPL-MCNC: 111 MG/DL
POTASSIUM SERPL-SCNC: 4.4 MMOL/L (ref 3.4–5.3)
PROT SERPL-MCNC: 7.3 G/DL (ref 6.4–8.3)
SHBG SERPL-SCNC: 28 NMOL/L (ref 11–80)
SODIUM SERPL-SCNC: 139 MMOL/L (ref 135–145)
TRIGL SERPL-MCNC: 71 MG/DL
TSH SERPL DL<=0.005 MIU/L-ACNC: 1.93 UIU/ML (ref 0.3–4.2)

## 2025-07-02 ENCOUNTER — OFFICE VISIT (OUTPATIENT)
Dept: URGENT CARE | Facility: URGENT CARE | Age: 45
End: 2025-07-02
Payer: COMMERCIAL

## 2025-07-02 ENCOUNTER — ANCILLARY PROCEDURE (OUTPATIENT)
Dept: GENERAL RADIOLOGY | Facility: CLINIC | Age: 45
End: 2025-07-02
Attending: NURSE PRACTITIONER
Payer: COMMERCIAL

## 2025-07-02 VITALS
SYSTOLIC BLOOD PRESSURE: 152 MMHG | RESPIRATION RATE: 18 BRPM | BODY MASS INDEX: 46.65 KG/M2 | TEMPERATURE: 98 F | OXYGEN SATURATION: 98 % | DIASTOLIC BLOOD PRESSURE: 83 MMHG | HEIGHT: 69 IN | WEIGHT: 315 LBS | HEART RATE: 56 BPM
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
## 2025-07-02 DIAGNOSIS — M54.2 NECK PAIN: Primary | ICD-10-CM

## 2025-07-02 DIAGNOSIS — S06.0X0A CONCUSSION WITHOUT LOSS OF CONSCIOUSNESS, INITIAL ENCOUNTER: ICD-10-CM

## 2025-07-02 DIAGNOSIS — M54.2 NECK PAIN: ICD-10-CM

## 2025-07-02 PROCEDURE — 72040 X-RAY EXAM NECK SPINE 2-3 VW: CPT | Mod: TC | Performed by: RADIOLOGY

## 2025-07-02 PROCEDURE — 3079F DIAST BP 80-89 MM HG: CPT | Performed by: NURSE PRACTITIONER

## 2025-07-02 PROCEDURE — 1125F AMNT PAIN NOTED PAIN PRSNT: CPT | Performed by: NURSE PRACTITIONER

## 2025-07-02 PROCEDURE — 99214 OFFICE O/P EST MOD 30 MIN: CPT | Performed by: NURSE PRACTITIONER

## 2025-07-02 PROCEDURE — 3077F SYST BP >= 140 MM HG: CPT | Performed by: NURSE PRACTITIONER

## 2025-07-02 RX ORDER — CYCLOBENZAPRINE HCL 5 MG
5 TABLET ORAL 3 TIMES DAILY PRN
Qty: 21 TABLET | Refills: 0 | Status: SHIPPED | OUTPATIENT
Start: 2025-07-02 | End: 2025-07-09

## 2025-07-02 ASSESSMENT — PAIN SCALES - GENERAL: PAINLEVEL_OUTOF10: SEVERE PAIN (7)
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
## 2025-07-03 ASSESSMENT — ENCOUNTER SYMPTOMS
DIZZINESS: 0
HEMATURIA: 0
CARDIOVASCULAR NEGATIVE: 1
SPEECH DIFFICULTY: 0
APPETITE CHANGE: 0
DYSURIA: 0
HEADACHES: 1
MYALGIAS: 1
WEAKNESS: 0
ACTIVITY CHANGE: 0
FEVER: 0
SEIZURES: 0
FATIGUE: 0
RESPIRATORY NEGATIVE: 1
EYES NEGATIVE: 1
CHILLS: 0
CONFUSION: 0

## 2025-07-03 NOTE — PATIENT INSTRUCTIONS
Neck xray - normal    Flexeril (cyclobenzaprine) 5 mg one tablet every 8 hours consistently for 3 days then as needed. Can make you drowsy.    Can take Tylenol as needed.    Hydrate and make sure you are getting adequate fluids.    Rest as much as possible. Limit your screen time.    No vigorous activity for a week.     If you develop any red flag symptoms (fevers, intractable vomiting, limited neck ROM) please go to the Emergency Department.    Return to clinic if symptoms persist

## (undated) DEVICE — ESU HOLDER LAP INST DISP YELLOW SHORT 250MM H-PRO-250

## (undated) DEVICE — TUBING ARTHRO CONMED/LINVATEC PUMP BLUE INFLOW 10K100

## (undated) DEVICE — GLOVE PROTEXIS W/NEU-THERA 7.0  2D73TE70

## (undated) DEVICE — ESU PENCIL W/SMOKE EVAC NEPTUNE STRYKER 0703-046-000

## (undated) DEVICE — DECANTER TRANSFER DEVICE 2008S

## (undated) DEVICE — SU ETHILON 3-0 PS-1 18" 1663H

## (undated) DEVICE — SYR 10ML LL W/O NDL 302995

## (undated) DEVICE — BNDG ELASTIC 6" DBL LENGTH UNSTERILE 6611-16

## (undated) DEVICE — PIN GUIDE ARTHREX 2.4MM W/EYE BEATH PIN AR-1297L

## (undated) DEVICE — BLADE SHAVER ARTHRO 4.2MM FULL RADIUS 9247A

## (undated) DEVICE — GLOVE PROTEXIS W/NEU-THERA 7.5  2D73TE75

## (undated) DEVICE — BLADE KNIFE SURG 15 371115

## (undated) DEVICE — Device

## (undated) DEVICE — CAST PADDING 6" STERILE 9046S

## (undated) DEVICE — COVER CAMERA IN-LIGHT DISP LT-C02

## (undated) DEVICE — NDL 18GA 1.5" 305196

## (undated) DEVICE — ESU GROUND PAD ADULT W/CORD E7507

## (undated) DEVICE — PIN ARTHREX FLIPCUTTER II  SHORT 9MM AR-1204AS-90

## (undated) DEVICE — DRSG ABDOMINAL 07 1/2X8" 7197D

## (undated) DEVICE — PACK ACL SUPPLEMENT STD

## (undated) DEVICE — REAMER ARTHREX LOW PROFILE 9MM  AR-1409LP

## (undated) DEVICE — SOL NACL 0.9% IRRIG 1000ML BOTTLE 2F7124

## (undated) DEVICE — SOL NACL 0.9% IRRIG 3000ML BAG 2B7477

## (undated) DEVICE — PIN DRILL ARTHREX ACL TIGHTROPE CLOSED EYELET 4MM AR-1595TC

## (undated) DEVICE — DRAPE U-DRAPE 1015NSD NON-STERILE

## (undated) DEVICE — SU VICRYL 2-0 CT-2 27" UND J269H

## (undated) DEVICE — LINEN DRAPE 54X72" 5467

## (undated) DEVICE — DRAPE TIBURON TOP SHEET 100X60" 29352

## (undated) DEVICE — VESSEL LOOPS RED MINI 31145710

## (undated) DEVICE — SYR 10ML FINGER CONTROL W/O NDL 309695

## (undated) DEVICE — SU TIGERSTICK #2 TIGERWIRE 50" STIFF END 12" AR-7209T

## (undated) DEVICE — SPONGE LAP 18X18" X8435

## (undated) DEVICE — SOL WATER IRRIG 1000ML BOTTLE 2F7114

## (undated) DEVICE — STRAP KNEE/BODY 31143004

## (undated) DEVICE — LINEN TOWEL PACK X5 5464

## (undated) DEVICE — SU MONOCRYL 3-0 PS-1 27" Y936H

## (undated) DEVICE — TUBING ARTHROSCOPY PUMP ARTHREX AR-6410

## (undated) DEVICE — SU FIBERWIRE 2 38"  AR-7200

## (undated) DEVICE — GLOVE PROTEXIS POWDER FREE 7.0 ORTHOPEDIC 2D73ET70

## (undated) DEVICE — LINEN ORTHO PACK 5446

## (undated) DEVICE — BLADE KNIFE SURG 10 371110

## (undated) DEVICE — DRSG STERI STRIP 1/2X4" R1547

## (undated) DEVICE — PIN GUIDE ARTHREX 2.4MM DRILL  AR-1250L

## (undated) DEVICE — SUCTION MANIFOLD DORNOCH ULTRA CART UL-CL500

## (undated) DEVICE — SU FIBERWIRE #2 FIBERSTICK 50"  AR-7209

## (undated) DEVICE — SU ETHIBOND 2 V-37 4X30" MX69G

## (undated) DEVICE — LINEN GOWN XLG 5407

## (undated) DEVICE — SPONGE RAY-TEC 4X8" 7318

## (undated) DEVICE — ABLATOR ARTHREX APOLLO RF MP90 ASPIRATING 90DEG AR-9811

## (undated) DEVICE — SU LOOP #2 TIGERLOOP AR-7234T

## (undated) DEVICE — SU VICRYL 1 CT-1 27" J341H

## (undated) DEVICE — SU LOOP #2 FIBERLOOP  AR-7234

## (undated) DEVICE — GOWN XLG DISP 9545

## (undated) DEVICE — SU VICRYL 2-0 CT-1 27" UND J259H

## (undated) DEVICE — DRSG ADAPTIC 3X8" 6113

## (undated) DEVICE — SUCTION MANIFOLD NEPTUNE 2 SYS 4 PORT 0702-020-000

## (undated) DEVICE — SU VICRYL 0 CT-1 27" UND J260H

## (undated) DEVICE — BLADE CLIPPER SGL USE 9680

## (undated) DEVICE — ESU ELEC VAPR PREMIER RF 90DEG 3.7MM 227204

## (undated) RX ORDER — PROPOFOL 10 MG/ML
INJECTION, EMULSION INTRAVENOUS
Status: DISPENSED
Start: 2018-05-23

## (undated) RX ORDER — OXYCODONE HYDROCHLORIDE 5 MG/1
TABLET ORAL
Status: DISPENSED
Start: 2018-05-23

## (undated) RX ORDER — FENTANYL CITRATE 50 UG/ML
INJECTION, SOLUTION INTRAMUSCULAR; INTRAVENOUS
Status: DISPENSED
Start: 2018-03-28

## (undated) RX ORDER — DEXAMETHASONE SODIUM PHOSPHATE 4 MG/ML
INJECTION, SOLUTION INTRA-ARTICULAR; INTRALESIONAL; INTRAMUSCULAR; INTRAVENOUS; SOFT TISSUE
Status: DISPENSED
Start: 2018-05-23

## (undated) RX ORDER — KETOROLAC TROMETHAMINE 30 MG/ML
INJECTION, SOLUTION INTRAMUSCULAR; INTRAVENOUS
Status: DISPENSED
Start: 2018-05-23

## (undated) RX ORDER — GABAPENTIN 300 MG/1
CAPSULE ORAL
Status: DISPENSED
Start: 2018-05-23

## (undated) RX ORDER — FENTANYL CITRATE 50 UG/ML
INJECTION, SOLUTION INTRAMUSCULAR; INTRAVENOUS
Status: DISPENSED
Start: 2018-08-15

## (undated) RX ORDER — BUPIVACAINE HYDROCHLORIDE AND EPINEPHRINE 2.5; 5 MG/ML; UG/ML
INJECTION, SOLUTION INFILTRATION; PERINEURAL
Status: DISPENSED
Start: 2022-02-02

## (undated) RX ORDER — ACETAMINOPHEN 325 MG/1
TABLET ORAL
Status: DISPENSED
Start: 2018-05-23

## (undated) RX ORDER — FENTANYL CITRATE 50 UG/ML
INJECTION, SOLUTION INTRAMUSCULAR; INTRAVENOUS
Status: DISPENSED
Start: 2018-05-23

## (undated) RX ORDER — FENTANYL CITRATE-0.9 % NACL/PF 10 MCG/ML
PLASTIC BAG, INJECTION (ML) INTRAVENOUS
Status: DISPENSED
Start: 2022-02-02

## (undated) RX ORDER — HYDROMORPHONE HYDROCHLORIDE 1 MG/ML
INJECTION, SOLUTION INTRAMUSCULAR; INTRAVENOUS; SUBCUTANEOUS
Status: DISPENSED
Start: 2018-05-23

## (undated) RX ORDER — CEFAZOLIN SODIUM IN 0.9 % NACL 3 G/100 ML
INTRAVENOUS SOLUTION, PIGGYBACK (ML) INTRAVENOUS
Status: DISPENSED
Start: 2022-02-02

## (undated) RX ORDER — ONDANSETRON 2 MG/ML
INJECTION INTRAMUSCULAR; INTRAVENOUS
Status: DISPENSED
Start: 2018-05-23

## (undated) RX ORDER — CEFAZOLIN SODIUM 1 G/3ML
INJECTION, POWDER, FOR SOLUTION INTRAMUSCULAR; INTRAVENOUS
Status: DISPENSED
Start: 2018-05-23

## (undated) RX ORDER — CEFAZOLIN SODIUM 1 G/50ML
SOLUTION INTRAVENOUS
Status: DISPENSED
Start: 2018-05-23

## (undated) RX ORDER — ACETAMINOPHEN 325 MG/1
TABLET ORAL
Status: DISPENSED
Start: 2022-02-02

## (undated) RX ORDER — CEFAZOLIN SODIUM 2 G/100ML
INJECTION, SOLUTION INTRAVENOUS
Status: DISPENSED
Start: 2022-02-02

## (undated) RX ORDER — DIPHENHYDRAMINE HYDROCHLORIDE 50 MG/ML
INJECTION INTRAMUSCULAR; INTRAVENOUS
Status: DISPENSED
Start: 2021-10-06

## (undated) RX ORDER — FENTANYL CITRATE 50 UG/ML
INJECTION, SOLUTION INTRAMUSCULAR; INTRAVENOUS
Status: DISPENSED
Start: 2021-10-06

## (undated) RX ORDER — FENTANYL CITRATE 50 UG/ML
INJECTION, SOLUTION INTRAMUSCULAR; INTRAVENOUS
Status: DISPENSED
Start: 2022-02-02

## (undated) RX ORDER — OXYCODONE HYDROCHLORIDE 5 MG/1
TABLET ORAL
Status: DISPENSED
Start: 2022-02-02